# Patient Record
Sex: FEMALE | Race: WHITE | NOT HISPANIC OR LATINO | Employment: OTHER | URBAN - METROPOLITAN AREA
[De-identification: names, ages, dates, MRNs, and addresses within clinical notes are randomized per-mention and may not be internally consistent; named-entity substitution may affect disease eponyms.]

---

## 2023-01-01 ENCOUNTER — TELEPHONE (OUTPATIENT)
Dept: HEMATOLOGY/ONCOLOGY | Facility: HOSPITAL | Age: 83
End: 2023-01-01
Payer: COMMERCIAL

## 2023-01-01 ENCOUNTER — APPOINTMENT (OUTPATIENT)
Dept: RADIOLOGY | Facility: HOSPITAL | Age: 83
DRG: 193 | End: 2023-01-01
Payer: COMMERCIAL

## 2023-01-01 ENCOUNTER — APPOINTMENT (OUTPATIENT)
Dept: RADIOLOGY | Facility: HOSPITAL | Age: 83
DRG: 562 | End: 2023-01-01
Payer: COMMERCIAL

## 2023-01-01 ENCOUNTER — HOSPITAL ENCOUNTER (INPATIENT)
Facility: HOSPITAL | Age: 83
LOS: 2 days | DRG: 178 | End: 2023-12-01
Attending: INTERNAL MEDICINE
Payer: OTHER MISCELLANEOUS

## 2023-01-01 ENCOUNTER — NURSING HOME VISIT (OUTPATIENT)
Dept: POST ACUTE CARE | Facility: EXTERNAL LOCATION | Age: 83
End: 2023-01-01
Payer: COMMERCIAL

## 2023-01-01 ENCOUNTER — HOSPITAL ENCOUNTER (OUTPATIENT)
Dept: RADIOLOGY | Facility: HOSPITAL | Age: 83
Discharge: HOME | End: 2023-11-10
Payer: COMMERCIAL

## 2023-01-01 ENCOUNTER — HOSPITAL ENCOUNTER (INPATIENT)
Facility: HOSPITAL | Age: 83
LOS: 8 days | Discharge: NURSING, REHAB OR LONG-TERM CARE | DRG: 193 | End: 2023-10-22
Attending: EMERGENCY MEDICINE | Admitting: HOSPITALIST
Payer: COMMERCIAL

## 2023-01-01 ENCOUNTER — TELEPHONE (OUTPATIENT)
Dept: RADIATION ONCOLOGY | Facility: HOSPITAL | Age: 83
End: 2023-01-01
Payer: COMMERCIAL

## 2023-01-01 ENCOUNTER — PATIENT MESSAGE (OUTPATIENT)
Dept: HEMATOLOGY/ONCOLOGY | Facility: CLINIC | Age: 83
End: 2023-01-01

## 2023-01-01 ENCOUNTER — APPOINTMENT (OUTPATIENT)
Dept: HEMATOLOGY/ONCOLOGY | Facility: HOSPITAL | Age: 83
End: 2023-01-01
Payer: COMMERCIAL

## 2023-01-01 ENCOUNTER — OFFICE VISIT (OUTPATIENT)
Dept: HEMATOLOGY/ONCOLOGY | Facility: HOSPITAL | Age: 83
End: 2023-01-01
Payer: COMMERCIAL

## 2023-01-01 ENCOUNTER — APPOINTMENT (OUTPATIENT)
Dept: HEMATOLOGY/ONCOLOGY | Facility: CLINIC | Age: 83
End: 2023-01-01
Payer: COMMERCIAL

## 2023-01-01 ENCOUNTER — NURSING HOME VISIT (OUTPATIENT)
Dept: POST ACUTE CARE | Facility: EXTERNAL LOCATION | Age: 83
End: 2023-01-01
Payer: MEDICARE

## 2023-01-01 ENCOUNTER — HOSPITAL ENCOUNTER (INPATIENT)
Facility: HOSPITAL | Age: 83
LOS: 3 days | Discharge: HOSPICE | DRG: 562 | End: 2023-11-29
Attending: EMERGENCY MEDICINE | Admitting: INTERNAL MEDICINE
Payer: COMMERCIAL

## 2023-01-01 ENCOUNTER — APPOINTMENT (OUTPATIENT)
Dept: CARDIOLOGY | Facility: HOSPITAL | Age: 83
DRG: 193 | End: 2023-01-01
Payer: COMMERCIAL

## 2023-01-01 ENCOUNTER — APPOINTMENT (OUTPATIENT)
Dept: CARDIOLOGY | Facility: HOSPITAL | Age: 83
End: 2023-01-01
Payer: COMMERCIAL

## 2023-01-01 ENCOUNTER — NUTRITION (OUTPATIENT)
Dept: HEMATOLOGY/ONCOLOGY | Facility: HOSPITAL | Age: 83
End: 2023-01-01
Payer: COMMERCIAL

## 2023-01-01 ENCOUNTER — HOSPITAL ENCOUNTER (OUTPATIENT)
Dept: RADIATION ONCOLOGY | Facility: HOSPITAL | Age: 83
Setting detail: RADIATION/ONCOLOGY SERIES
Discharge: STILL ADMITTED OR ADMINISTRATIVE | End: 2023-11-02
Payer: COMMERCIAL

## 2023-01-01 ENCOUNTER — APPOINTMENT (OUTPATIENT)
Dept: RADIOLOGY | Facility: HOSPITAL | Age: 83
End: 2023-01-01
Payer: COMMERCIAL

## 2023-01-01 ENCOUNTER — TELEMEDICINE (OUTPATIENT)
Dept: HEMATOLOGY/ONCOLOGY | Facility: HOSPITAL | Age: 83
End: 2023-01-01
Payer: COMMERCIAL

## 2023-01-01 ENCOUNTER — HOSPITAL ENCOUNTER (INPATIENT)
Facility: HOSPITAL | Age: 83
LOS: 5 days | Discharge: NURSING, REHAB OR LONG-TERM CARE | DRG: 193 | End: 2023-10-06
Attending: GENERAL PRACTICE | Admitting: INTERNAL MEDICINE
Payer: COMMERCIAL

## 2023-01-01 ENCOUNTER — OFFICE VISIT (OUTPATIENT)
Dept: HEMATOLOGY/ONCOLOGY | Facility: CLINIC | Age: 83
End: 2023-01-01
Payer: COMMERCIAL

## 2023-01-01 ENCOUNTER — LAB (OUTPATIENT)
Dept: LAB | Facility: HOSPITAL | Age: 83
End: 2023-01-01
Payer: COMMERCIAL

## 2023-01-01 VITALS
DIASTOLIC BLOOD PRESSURE: 85 MMHG | OXYGEN SATURATION: 97 % | RESPIRATION RATE: 18 BRPM | WEIGHT: 98.33 LBS | SYSTOLIC BLOOD PRESSURE: 174 MMHG | HEIGHT: 61 IN | HEART RATE: 94 BPM | TEMPERATURE: 99.7 F | BODY MASS INDEX: 18.56 KG/M2

## 2023-01-01 VITALS
DIASTOLIC BLOOD PRESSURE: 59 MMHG | TEMPERATURE: 97.2 F | SYSTOLIC BLOOD PRESSURE: 104 MMHG | OXYGEN SATURATION: 99 % | BODY MASS INDEX: 17.61 KG/M2 | WEIGHT: 95.68 LBS | HEART RATE: 63 BPM | HEIGHT: 62 IN | RESPIRATION RATE: 20 BRPM

## 2023-01-01 VITALS
BODY MASS INDEX: 17.66 KG/M2 | SYSTOLIC BLOOD PRESSURE: 152 MMHG | TEMPERATURE: 97.9 F | HEIGHT: 63 IN | HEART RATE: 64 BPM | WEIGHT: 99.65 LBS | RESPIRATION RATE: 17 BRPM | DIASTOLIC BLOOD PRESSURE: 67 MMHG | OXYGEN SATURATION: 98 %

## 2023-01-01 VITALS
BODY MASS INDEX: 19.57 KG/M2 | TEMPERATURE: 98.2 F | RESPIRATION RATE: 17 BRPM | HEART RATE: 73 BPM | HEIGHT: 63 IN | OXYGEN SATURATION: 95 % | WEIGHT: 110.45 LBS | SYSTOLIC BLOOD PRESSURE: 113 MMHG | DIASTOLIC BLOOD PRESSURE: 48 MMHG

## 2023-01-01 VITALS
HEIGHT: 62 IN | TEMPERATURE: 96.6 F | WEIGHT: 93.3 LBS | OXYGEN SATURATION: 95 % | RESPIRATION RATE: 18 BRPM | SYSTOLIC BLOOD PRESSURE: 75 MMHG | DIASTOLIC BLOOD PRESSURE: 42 MMHG | BODY MASS INDEX: 17.17 KG/M2 | HEART RATE: 76 BPM

## 2023-01-01 VITALS — BODY MASS INDEX: 17.17 KG/M2 | HEIGHT: 62 IN | WEIGHT: 93.3 LBS

## 2023-01-01 VITALS
DIASTOLIC BLOOD PRESSURE: 66 MMHG | SYSTOLIC BLOOD PRESSURE: 107 MMHG | HEART RATE: 99 BPM | TEMPERATURE: 98.1 F | OXYGEN SATURATION: 94 % | BODY MASS INDEX: 16.52 KG/M2 | WEIGHT: 93.25 LBS | RESPIRATION RATE: 18 BRPM

## 2023-01-01 DIAGNOSIS — J44.1 COPD EXACERBATION (MULTI): ICD-10-CM

## 2023-01-01 DIAGNOSIS — K21.9 GASTROESOPHAGEAL REFLUX DISEASE WITHOUT ESOPHAGITIS: ICD-10-CM

## 2023-01-01 DIAGNOSIS — C34.91 SQUAMOUS CELL CARCINOMA OF RIGHT LUNG (MULTI): ICD-10-CM

## 2023-01-01 DIAGNOSIS — J18.9 ACUTE PNEUMONIA: ICD-10-CM

## 2023-01-01 DIAGNOSIS — I50.31 ACUTE DIASTOLIC HF (HEART FAILURE) (MULTI): ICD-10-CM

## 2023-01-01 DIAGNOSIS — R06.2 WHEEZING: ICD-10-CM

## 2023-01-01 DIAGNOSIS — J44.0 CHRONIC OBSTRUCTIVE PULMONARY DISEASE WITH ACUTE LOWER RESPIRATORY INFECTION (MULTI): ICD-10-CM

## 2023-01-01 DIAGNOSIS — R53.1 WEAKNESS: ICD-10-CM

## 2023-01-01 DIAGNOSIS — D64.9 ANEMIA, UNSPECIFIED TYPE: ICD-10-CM

## 2023-01-01 DIAGNOSIS — R52 PAIN: Primary | ICD-10-CM

## 2023-01-01 DIAGNOSIS — R53.1 WEAKNESS: Primary | ICD-10-CM

## 2023-01-01 DIAGNOSIS — I35.0 MODERATE AORTIC STENOSIS BY PRIOR ECHOCARDIOGRAM: ICD-10-CM

## 2023-01-01 DIAGNOSIS — J44.9 CHRONIC OBSTRUCTIVE PULMONARY DISEASE, UNSPECIFIED COPD TYPE (MULTI): ICD-10-CM

## 2023-01-01 DIAGNOSIS — M19.90 OSTEOARTHRITIS, UNSPECIFIED OSTEOARTHRITIS TYPE, UNSPECIFIED SITE: ICD-10-CM

## 2023-01-01 DIAGNOSIS — S32.040S COMPRESSION FRACTURE OF L4 LUMBAR VERTEBRA, SEQUELA: ICD-10-CM

## 2023-01-01 DIAGNOSIS — F32.A DEPRESSION, UNSPECIFIED DEPRESSION TYPE: ICD-10-CM

## 2023-01-01 DIAGNOSIS — R63.4 ABNORMAL WEIGHT LOSS: Primary | ICD-10-CM

## 2023-01-01 DIAGNOSIS — R82.90 FOUL SMELLING URINE: ICD-10-CM

## 2023-01-01 DIAGNOSIS — J96.20 ACUTE ON CHRONIC RESPIRATORY FAILURE, UNSPECIFIED WHETHER WITH HYPOXIA OR HYPERCAPNIA (MULTI): ICD-10-CM

## 2023-01-01 DIAGNOSIS — I10 PRIMARY HYPERTENSION: ICD-10-CM

## 2023-01-01 DIAGNOSIS — J96.90 RESPIRATORY FAILURE (MULTI): ICD-10-CM

## 2023-01-01 DIAGNOSIS — I10 HYPERTENSION, UNSPECIFIED TYPE: Primary | ICD-10-CM

## 2023-01-01 DIAGNOSIS — I10 HYPERTENSION, UNSPECIFIED TYPE: ICD-10-CM

## 2023-01-01 DIAGNOSIS — S42.001A CLOSED NONDISPLACED FRACTURE OF RIGHT CLAVICLE, UNSPECIFIED PART OF CLAVICLE, INITIAL ENCOUNTER: ICD-10-CM

## 2023-01-01 DIAGNOSIS — R91.1 LUNG NODULE: ICD-10-CM

## 2023-01-01 DIAGNOSIS — I35.0 AORTIC STENOSIS, SEVERE: ICD-10-CM

## 2023-01-01 DIAGNOSIS — J40 BRONCHITIS: ICD-10-CM

## 2023-01-01 DIAGNOSIS — R05.1 ACUTE COUGH: ICD-10-CM

## 2023-01-01 DIAGNOSIS — R13.12 OROPHARYNGEAL DYSPHAGIA: ICD-10-CM

## 2023-01-01 DIAGNOSIS — C34.91 SQUAMOUS CELL CARCINOMA OF RIGHT LUNG (MULTI): Primary | ICD-10-CM

## 2023-01-01 DIAGNOSIS — I50.9 CONGESTIVE HEART FAILURE, UNSPECIFIED HF CHRONICITY, UNSPECIFIED HEART FAILURE TYPE (MULTI): ICD-10-CM

## 2023-01-01 DIAGNOSIS — J44.9 CHRONIC OBSTRUCTIVE PULMONARY DISEASE, UNSPECIFIED COPD TYPE (MULTI): Primary | ICD-10-CM

## 2023-01-01 DIAGNOSIS — I27.20 PULMONARY HTN (MULTI): ICD-10-CM

## 2023-01-01 DIAGNOSIS — J18.9 ACUTE PNEUMONIA: Primary | ICD-10-CM

## 2023-01-01 DIAGNOSIS — I21.4 NSTEMI (NON-ST ELEVATED MYOCARDIAL INFARCTION) (MULTI): ICD-10-CM

## 2023-01-01 DIAGNOSIS — K21.9 GASTROESOPHAGEAL REFLUX DISEASE WITHOUT ESOPHAGITIS: Primary | ICD-10-CM

## 2023-01-01 DIAGNOSIS — J98.8 CONGESTION OF RESPIRATORY TRACT: ICD-10-CM

## 2023-01-01 DIAGNOSIS — E46 PROTEIN MALNUTRITION (MULTI): ICD-10-CM

## 2023-01-01 DIAGNOSIS — J18.9 PNEUMONIA OF BOTH LUNGS DUE TO INFECTIOUS ORGANISM, UNSPECIFIED PART OF LUNG: Primary | ICD-10-CM

## 2023-01-01 DIAGNOSIS — I5A NON-ISCHEMIC MYOCARDIAL INJURY (NON-TRAUMATIC): ICD-10-CM

## 2023-01-01 DIAGNOSIS — I50.31 ACUTE DIASTOLIC HF (HEART FAILURE) (MULTI): Primary | ICD-10-CM

## 2023-01-01 DIAGNOSIS — J18.9 PNEUMONIA OF RIGHT LOWER LOBE DUE TO INFECTIOUS ORGANISM: Primary | ICD-10-CM

## 2023-01-01 DIAGNOSIS — J18.9 PNEUMONIA OF BOTH LUNGS DUE TO INFECTIOUS ORGANISM, UNSPECIFIED PART OF LUNG: ICD-10-CM

## 2023-01-01 LAB
ABO GROUP (TYPE) IN BLOOD: NORMAL
ABO GROUP (TYPE) IN BLOOD: NORMAL
ACTH PLAS-MCNC: 35.2 PG/ML (ref 7.2–63.3)
ALBUMIN SERPL BCP-MCNC: 3 G/DL (ref 3.4–5)
ALBUMIN SERPL BCP-MCNC: 3 G/DL (ref 3.4–5)
ALBUMIN SERPL BCP-MCNC: 3.1 G/DL (ref 3.4–5)
ALBUMIN SERPL BCP-MCNC: 3.3 G/DL (ref 3.4–5)
ALBUMIN SERPL BCP-MCNC: 3.3 G/DL (ref 3.4–5)
ALBUMIN SERPL BCP-MCNC: 3.5 G/DL (ref 3.4–5)
ALBUMIN SERPL BCP-MCNC: 3.6 G/DL (ref 3.4–5)
ALP SERPL-CCNC: 40 U/L (ref 33–136)
ALP SERPL-CCNC: 44 U/L (ref 33–136)
ALP SERPL-CCNC: 45 U/L (ref 33–136)
ALP SERPL-CCNC: 48 U/L (ref 33–136)
ALP SERPL-CCNC: 48 U/L (ref 33–136)
ALP SERPL-CCNC: 49 U/L (ref 33–136)
ALP SERPL-CCNC: 61 U/L (ref 33–136)
ALT SERPL W P-5'-P-CCNC: 4 U/L (ref 7–45)
ALT SERPL W P-5'-P-CCNC: 5 U/L (ref 7–45)
ALT SERPL W P-5'-P-CCNC: 6 U/L (ref 7–45)
ALT SERPL W P-5'-P-CCNC: 6 U/L (ref 7–45)
ALT SERPL W P-5'-P-CCNC: 9 U/L (ref 7–45)
ANION GAP BLDA CALCULATED.4IONS-SCNC: 8 MMO/L (ref 10–25)
ANION GAP BLDV CALCULATED.4IONS-SCNC: 6 MMOL/L (ref 10–25)
ANION GAP SERPL CALC-SCNC: 10 MMOL/L (ref 10–20)
ANION GAP SERPL CALC-SCNC: 11 MMOL/L (ref 10–20)
ANION GAP SERPL CALC-SCNC: 12 MMOL/L (ref 10–20)
ANION GAP SERPL CALC-SCNC: 13 MMOL/L (ref 10–20)
ANION GAP SERPL CALC-SCNC: 13 MMOL/L (ref 10–20)
ANION GAP SERPL CALC-SCNC: 14 MMOL/L (ref 10–20)
ANION GAP SERPL CALC-SCNC: 15 MMOL/L (ref 10–20)
ANION GAP SERPL CALC-SCNC: 15 MMOL/L (ref 10–20)
ANION GAP SERPL CALC-SCNC: 7 MMOL/L (ref 10–20)
ANION GAP SERPL CALC-SCNC: 8 MMOL/L (ref 10–20)
ANION GAP SERPL CALC-SCNC: 9 MMOL/L (ref 10–20)
ANION GAP SERPL CALC-SCNC: 9 MMOL/L (ref 10–20)
ANTIBODY SCREEN: NORMAL
ANTIBODY SCREEN: NORMAL
APPEARANCE UR: CLEAR
AST SERPL W P-5'-P-CCNC: 12 U/L (ref 9–39)
AST SERPL W P-5'-P-CCNC: 12 U/L (ref 9–39)
AST SERPL W P-5'-P-CCNC: 15 U/L (ref 9–39)
AST SERPL W P-5'-P-CCNC: 15 U/L (ref 9–39)
AST SERPL W P-5'-P-CCNC: 17 U/L (ref 9–39)
AST SERPL W P-5'-P-CCNC: 19 U/L (ref 9–39)
AST SERPL W P-5'-P-CCNC: 22 U/L (ref 9–39)
ATRIAL RATE: 66 BPM
BACTERIA BLD CULT: NORMAL
BASE EXCESS BLDA CALC-SCNC: 6.4 MMOL/L (ref -2–3)
BASE EXCESS BLDA CALC-SCNC: 6.9 MMOL/L (ref -2–3)
BASE EXCESS BLDV CALC-SCNC: 8.1 MMOL/L (ref -2–3)
BASOPHILS # BLD AUTO: 0.02 X10*3/UL (ref 0–0.1)
BASOPHILS # BLD AUTO: 0.03 X10*3/UL (ref 0–0.1)
BASOPHILS # BLD AUTO: 0.03 X10*3/UL (ref 0–0.1)
BASOPHILS # BLD AUTO: 0.04 X10*3/UL (ref 0–0.1)
BASOPHILS NFR BLD AUTO: 0.2 %
BASOPHILS NFR BLD AUTO: 0.2 %
BASOPHILS NFR BLD AUTO: 0.3 %
BASOPHILS NFR BLD AUTO: 0.4 %
BASOPHILS NFR BLD AUTO: 0.6 %
BILIRUB SERPL-MCNC: 0.3 MG/DL (ref 0–1.2)
BILIRUB SERPL-MCNC: 0.3 MG/DL (ref 0–1.2)
BILIRUB SERPL-MCNC: 0.4 MG/DL (ref 0–1.2)
BILIRUB SERPL-MCNC: 0.5 MG/DL (ref 0–1.2)
BILIRUB SERPL-MCNC: 0.5 MG/DL (ref 0–1.2)
BILIRUB UR STRIP.AUTO-MCNC: NEGATIVE MG/DL
BNP SERPL-MCNC: 292 PG/ML (ref 0–99)
BNP SERPL-MCNC: 541 PG/ML (ref 0–99)
BNP SERPL-MCNC: 735 PG/ML (ref 0–99)
BODY TEMPERATURE: 37 DEGREES CELSIUS
BUN SERPL-MCNC: 14 MG/DL (ref 6–23)
BUN SERPL-MCNC: 14 MG/DL (ref 6–23)
BUN SERPL-MCNC: 15 MG/DL (ref 6–23)
BUN SERPL-MCNC: 17 MG/DL (ref 6–23)
BUN SERPL-MCNC: 21 MG/DL (ref 6–23)
BUN SERPL-MCNC: 22 MG/DL (ref 6–23)
BUN SERPL-MCNC: 6 MG/DL (ref 6–23)
BUN SERPL-MCNC: 6 MG/DL (ref 6–23)
BUN SERPL-MCNC: 7 MG/DL (ref 6–23)
BUN SERPL-MCNC: 8 MG/DL (ref 6–23)
BUN SERPL-MCNC: 9 MG/DL (ref 6–23)
BUN SERPL-MCNC: 9 MG/DL (ref 6–23)
CA-I BLDA-SCNC: 1.18 MMOL/L (ref 1.1–1.33)
CA-I BLDV-SCNC: 1.1 MMOL/L (ref 1.1–1.33)
CALCIUM SERPL-MCNC: 7.1 MG/DL (ref 8.6–10.3)
CALCIUM SERPL-MCNC: 7.3 MG/DL (ref 8.6–10.3)
CALCIUM SERPL-MCNC: 7.4 MG/DL (ref 8.6–10.3)
CALCIUM SERPL-MCNC: 7.7 MG/DL (ref 8.6–10.3)
CALCIUM SERPL-MCNC: 8.3 MG/DL (ref 8.6–10.3)
CALCIUM SERPL-MCNC: 8.4 MG/DL (ref 8.6–10.3)
CALCIUM SERPL-MCNC: 8.5 MG/DL (ref 8.6–10.3)
CALCIUM SERPL-MCNC: 8.5 MG/DL (ref 8.6–10.3)
CALCIUM SERPL-MCNC: 8.6 MG/DL (ref 8.6–10.3)
CALCIUM SERPL-MCNC: 8.6 MG/DL (ref 8.6–10.3)
CALCIUM SERPL-MCNC: 8.8 MG/DL (ref 8.6–10.3)
CALCIUM SERPL-MCNC: 8.8 MG/DL (ref 8.6–10.6)
CALCIUM SERPL-MCNC: 8.9 MG/DL (ref 8.6–10.3)
CALCIUM SERPL-MCNC: 8.9 MG/DL (ref 8.6–10.6)
CALCIUM SERPL-MCNC: 9 MG/DL (ref 8.6–10.3)
CALCIUM SERPL-MCNC: 9 MG/DL (ref 8.6–10.6)
CALCIUM SERPL-MCNC: 9.1 MG/DL (ref 8.6–10.3)
CARDIAC TROPONIN I PNL SERPL HS: 15 NG/L (ref 0–13)
CARDIAC TROPONIN I PNL SERPL HS: 477 NG/L (ref 0–13)
CARDIAC TROPONIN I PNL SERPL HS: 791 NG/L (ref 0–13)
CARDIAC TROPONIN I PNL SERPL HS: 813 NG/L (ref 0–13)
CARDIAC TROPONIN I PNL SERPL HS: 966 NG/L (ref 0–13)
CHLORIDE BLDA-SCNC: 100 MMOL/L (ref 98–107)
CHLORIDE BLDV-SCNC: 97 MMOL/L (ref 98–107)
CHLORIDE SERPL-SCNC: 100 MMOL/L (ref 98–107)
CHLORIDE SERPL-SCNC: 101 MMOL/L (ref 98–107)
CHLORIDE SERPL-SCNC: 102 MMOL/L (ref 98–107)
CHLORIDE SERPL-SCNC: 102 MMOL/L (ref 98–107)
CHLORIDE SERPL-SCNC: 104 MMOL/L (ref 98–107)
CHLORIDE SERPL-SCNC: 104 MMOL/L (ref 98–107)
CHLORIDE SERPL-SCNC: 93 MMOL/L (ref 98–107)
CHLORIDE SERPL-SCNC: 95 MMOL/L (ref 98–107)
CHLORIDE SERPL-SCNC: 96 MMOL/L (ref 98–107)
CHLORIDE SERPL-SCNC: 97 MMOL/L (ref 98–107)
CHLORIDE SERPL-SCNC: 97 MMOL/L (ref 98–107)
CHLORIDE SERPL-SCNC: 98 MMOL/L (ref 98–107)
CHLORIDE SERPL-SCNC: 98 MMOL/L (ref 98–107)
CHLORIDE SERPL-SCNC: 99 MMOL/L (ref 98–107)
CHLORIDE SERPL-SCNC: 99 MMOL/L (ref 98–107)
CHOLEST SERPL-MCNC: 127 MG/DL (ref 0–199)
CHOLESTEROL/HDL RATIO: 2.9
CO2 SERPL-SCNC: 26 MMOL/L (ref 21–32)
CO2 SERPL-SCNC: 26 MMOL/L (ref 21–32)
CO2 SERPL-SCNC: 28 MMOL/L (ref 21–32)
CO2 SERPL-SCNC: 29 MMOL/L (ref 21–32)
CO2 SERPL-SCNC: 31 MMOL/L (ref 21–32)
CO2 SERPL-SCNC: 32 MMOL/L (ref 21–32)
CO2 SERPL-SCNC: 33 MMOL/L (ref 21–32)
CO2 SERPL-SCNC: 34 MMOL/L (ref 21–32)
CO2 SERPL-SCNC: 35 MMOL/L (ref 21–32)
CO2 SERPL-SCNC: 35 MMOL/L (ref 21–32)
CO2 SERPL-SCNC: 38 MMOL/L (ref 21–32)
COLOR UR: YELLOW
CORTIS AM PEAK SERPL-MSCNC: 10.3 UG/DL (ref 5–20)
CREAT SERPL-MCNC: 0.74 MG/DL (ref 0.5–1.05)
CREAT SERPL-MCNC: 0.78 MG/DL (ref 0.5–1.05)
CREAT SERPL-MCNC: 0.8 MG/DL (ref 0.5–1.05)
CREAT SERPL-MCNC: 0.8 MG/DL (ref 0.5–1.05)
CREAT SERPL-MCNC: 0.82 MG/DL (ref 0.5–1.05)
CREAT SERPL-MCNC: 0.82 MG/DL (ref 0.5–1.05)
CREAT SERPL-MCNC: 0.83 MG/DL (ref 0.5–1.05)
CREAT SERPL-MCNC: 0.83 MG/DL (ref 0.5–1.05)
CREAT SERPL-MCNC: 0.84 MG/DL (ref 0.5–1.05)
CREAT SERPL-MCNC: 0.85 MG/DL (ref 0.5–1.05)
CREAT SERPL-MCNC: 0.85 MG/DL (ref 0.5–1.05)
CREAT SERPL-MCNC: 0.88 MG/DL (ref 0.5–1.05)
CREAT SERPL-MCNC: 0.89 MG/DL (ref 0.5–1.05)
CREAT SERPL-MCNC: 0.94 MG/DL (ref 0.5–1.05)
CREAT SERPL-MCNC: 0.96 MG/DL (ref 0.5–1.05)
CREAT SERPL-MCNC: 1 MG/DL (ref 0.5–1.05)
CREAT SERPL-MCNC: 1 MG/DL (ref 0.5–1.05)
CREAT SERPL-MCNC: 1.26 MG/DL (ref 0.5–1.05)
CREAT SERPL-MCNC: 1.47 MG/DL (ref 0.5–1.05)
CRP SERPL-MCNC: 16.04 MG/DL
CRP SERPL-MCNC: 7.81 MG/DL
D DIMER PPP FEU-MCNC: 1600 NG/ML FEU
EJECTION FRACTION APICAL 4 CHAMBER: 73.6
ELECTRONICALLY SIGNED BY: NORMAL
EOSINOPHIL # BLD AUTO: 0.06 X10*3/UL (ref 0–0.4)
EOSINOPHIL # BLD AUTO: 0.12 X10*3/UL (ref 0–0.4)
EOSINOPHIL # BLD AUTO: 0.15 X10*3/UL (ref 0–0.4)
EOSINOPHIL # BLD AUTO: 0.15 X10*3/UL (ref 0–0.4)
EOSINOPHIL # BLD AUTO: 0.2 X10*3/UL (ref 0–0.4)
EOSINOPHIL # BLD AUTO: 0.22 X10*3/UL (ref 0–0.4)
EOSINOPHIL # BLD AUTO: 0.23 X10*3/UL (ref 0–0.4)
EOSINOPHIL NFR BLD AUTO: 0.5 %
EOSINOPHIL NFR BLD AUTO: 1.6 %
EOSINOPHIL NFR BLD AUTO: 2 %
EOSINOPHIL NFR BLD AUTO: 2.2 %
EOSINOPHIL NFR BLD AUTO: 3.1 %
EOSINOPHIL NFR BLD AUTO: 3.5 %
EOSINOPHIL NFR BLD AUTO: 3.5 %
ERYTHROCYTE [DISTWIDTH] IN BLOOD BY AUTOMATED COUNT: 14.1 % (ref 11.5–14.5)
ERYTHROCYTE [DISTWIDTH] IN BLOOD BY AUTOMATED COUNT: 14.2 % (ref 11.5–14.5)
ERYTHROCYTE [DISTWIDTH] IN BLOOD BY AUTOMATED COUNT: 14.2 % (ref 11.5–14.5)
ERYTHROCYTE [DISTWIDTH] IN BLOOD BY AUTOMATED COUNT: 14.4 % (ref 11.5–14.5)
ERYTHROCYTE [DISTWIDTH] IN BLOOD BY AUTOMATED COUNT: 14.5 % (ref 11.5–14.5)
ERYTHROCYTE [DISTWIDTH] IN BLOOD BY AUTOMATED COUNT: 14.6 % (ref 11.5–14.5)
ERYTHROCYTE [DISTWIDTH] IN BLOOD BY AUTOMATED COUNT: 14.7 % (ref 11.5–14.5)
ERYTHROCYTE [DISTWIDTH] IN BLOOD BY AUTOMATED COUNT: 14.8 % (ref 11.5–14.5)
ERYTHROCYTE [DISTWIDTH] IN BLOOD BY AUTOMATED COUNT: 15.1 % (ref 11.5–14.5)
ERYTHROCYTE [DISTWIDTH] IN BLOOD BY AUTOMATED COUNT: 15.3 % (ref 11.5–14.5)
ERYTHROCYTE [DISTWIDTH] IN BLOOD BY AUTOMATED COUNT: 15.5 % (ref 11.5–14.5)
ERYTHROCYTE [DISTWIDTH] IN BLOOD BY AUTOMATED COUNT: 15.5 % (ref 11.5–14.5)
ERYTHROCYTE [DISTWIDTH] IN BLOOD BY AUTOMATED COUNT: 15.6 % (ref 11.5–14.5)
FERRITIN SERPL-MCNC: 148 NG/ML (ref 8–150)
FERRITIN SERPL-MCNC: 175 NG/ML (ref 8–150)
FLUAV RNA RESP QL NAA+PROBE: NOT DETECTED
FLUBV RNA RESP QL NAA+PROBE: NOT DETECTED
FOCUSED SOLID TUMOR DNA/RNA RESULTS: NORMAL
FOLATE SERPL-MCNC: 8.2 NG/ML
GFR SERPL CREATININE-BSD FRML MDRD: 35 ML/MIN/1.73M*2
GFR SERPL CREATININE-BSD FRML MDRD: 43 ML/MIN/1.73M*2
GFR SERPL CREATININE-BSD FRML MDRD: 56 ML/MIN/1.73M*2
GFR SERPL CREATININE-BSD FRML MDRD: 57 ML/MIN/1.73M*2
GFR SERPL CREATININE-BSD FRML MDRD: 60 ML/MIN/1.73M*2
GFR SERPL CREATININE-BSD FRML MDRD: 61 ML/MIN/1.73M*2
GFR SERPL CREATININE-BSD FRML MDRD: 65 ML/MIN/1.73M*2
GFR SERPL CREATININE-BSD FRML MDRD: 66 ML/MIN/1.73M*2
GFR SERPL CREATININE-BSD FRML MDRD: 69 ML/MIN/1.73M*2
GFR SERPL CREATININE-BSD FRML MDRD: 70 ML/MIN/1.73M*2
GFR SERPL CREATININE-BSD FRML MDRD: 70 ML/MIN/1.73M*2
GFR SERPL CREATININE-BSD FRML MDRD: 72 ML/MIN/1.73M*2
GFR SERPL CREATININE-BSD FRML MDRD: 72 ML/MIN/1.73M*2
GFR SERPL CREATININE-BSD FRML MDRD: 74 ML/MIN/1.73M*2
GFR SERPL CREATININE-BSD FRML MDRD: 74 ML/MIN/1.73M*2
GFR SERPL CREATININE-BSD FRML MDRD: 76 ML/MIN/1.73M*2
GFR SERPL CREATININE-BSD FRML MDRD: 81 ML/MIN/1.73M*2
GLUCOSE BLD MANUAL STRIP-MCNC: 103 MG/DL (ref 74–99)
GLUCOSE BLD MANUAL STRIP-MCNC: 126 MG/DL (ref 74–99)
GLUCOSE BLD MANUAL STRIP-MCNC: 64 MG/DL (ref 74–99)
GLUCOSE BLD MANUAL STRIP-MCNC: 77 MG/DL (ref 74–99)
GLUCOSE BLD MANUAL STRIP-MCNC: 81 MG/DL (ref 74–99)
GLUCOSE BLD MANUAL STRIP-MCNC: 93 MG/DL (ref 74–99)
GLUCOSE BLDA-MCNC: 98 MG/DL (ref 74–99)
GLUCOSE BLDV-MCNC: 103 MG/DL (ref 74–99)
GLUCOSE SERPL-MCNC: 102 MG/DL (ref 74–99)
GLUCOSE SERPL-MCNC: 118 MG/DL (ref 74–99)
GLUCOSE SERPL-MCNC: 127 MG/DL (ref 74–99)
GLUCOSE SERPL-MCNC: 166 MG/DL (ref 74–99)
GLUCOSE SERPL-MCNC: 65 MG/DL (ref 74–99)
GLUCOSE SERPL-MCNC: 67 MG/DL (ref 74–99)
GLUCOSE SERPL-MCNC: 68 MG/DL (ref 74–99)
GLUCOSE SERPL-MCNC: 69 MG/DL (ref 74–99)
GLUCOSE SERPL-MCNC: 71 MG/DL (ref 74–99)
GLUCOSE SERPL-MCNC: 71 MG/DL (ref 74–99)
GLUCOSE SERPL-MCNC: 73 MG/DL (ref 74–99)
GLUCOSE SERPL-MCNC: 75 MG/DL (ref 74–99)
GLUCOSE SERPL-MCNC: 79 MG/DL (ref 74–99)
GLUCOSE SERPL-MCNC: 80 MG/DL (ref 74–99)
GLUCOSE SERPL-MCNC: 80 MG/DL (ref 74–99)
GLUCOSE SERPL-MCNC: 81 MG/DL (ref 74–99)
GLUCOSE SERPL-MCNC: 82 MG/DL (ref 74–99)
GLUCOSE SERPL-MCNC: 93 MG/DL (ref 74–99)
GLUCOSE SERPL-MCNC: 95 MG/DL (ref 74–99)
GLUCOSE UR STRIP.AUTO-MCNC: NEGATIVE MG/DL
HBV CORE AB SER QL: NONREACTIVE
HBV SURFACE AB SER-ACNC: <3.1 MIU/ML
HBV SURFACE AG SERPL QL IA: NONREACTIVE
HCO3 BLDA-SCNC: 31.9 MMOL/L (ref 22–26)
HCO3 BLDA-SCNC: 32.7 MMOL/L (ref 22–26)
HCO3 BLDV-SCNC: 33.7 MMOL/L (ref 22–26)
HCT VFR BLD AUTO: 23.7 % (ref 36–46)
HCT VFR BLD AUTO: 24 % (ref 36–46)
HCT VFR BLD AUTO: 25.8 % (ref 36–46)
HCT VFR BLD AUTO: 27 % (ref 36–46)
HCT VFR BLD AUTO: 27.2 % (ref 36–46)
HCT VFR BLD AUTO: 27.6 % (ref 36–46)
HCT VFR BLD AUTO: 27.7 % (ref 36–46)
HCT VFR BLD AUTO: 28.1 % (ref 36–46)
HCT VFR BLD AUTO: 28.3 % (ref 36–46)
HCT VFR BLD AUTO: 28.5 % (ref 36–46)
HCT VFR BLD AUTO: 28.7 % (ref 36–46)
HCT VFR BLD AUTO: 29.1 % (ref 36–46)
HCT VFR BLD AUTO: 29.2 % (ref 36–46)
HCT VFR BLD AUTO: 30.2 % (ref 36–46)
HCT VFR BLD AUTO: 31.4 % (ref 36–46)
HCT VFR BLD EST: 22 % (ref 36–46)
HCT VFR BLD EST: 29 % (ref 36–46)
HDLC SERPL-MCNC: 43.4 MG/DL
HGB BLD-MCNC: 7.3 G/DL (ref 12–16)
HGB BLD-MCNC: 7.3 G/DL (ref 12–16)
HGB BLD-MCNC: 7.8 G/DL (ref 12–16)
HGB BLD-MCNC: 8.2 G/DL (ref 12–16)
HGB BLD-MCNC: 8.5 G/DL (ref 12–16)
HGB BLD-MCNC: 8.7 G/DL (ref 12–16)
HGB BLD-MCNC: 8.7 G/DL (ref 12–16)
HGB BLD-MCNC: 8.8 G/DL (ref 12–16)
HGB BLD-MCNC: 8.9 G/DL (ref 12–16)
HGB BLD-MCNC: 9 G/DL (ref 12–16)
HGB BLD-MCNC: 9.1 G/DL (ref 12–16)
HGB BLD-MCNC: 9.2 G/DL (ref 12–16)
HGB BLDA-MCNC: 7.3 G/DL (ref 12–16)
HGB BLDV-MCNC: 9.8 G/DL (ref 12–16)
HOLD SPECIMEN: NORMAL
IMM GRANULOCYTES # BLD AUTO: 0.01 X10*3/UL (ref 0–0.5)
IMM GRANULOCYTES # BLD AUTO: 0.02 X10*3/UL (ref 0–0.5)
IMM GRANULOCYTES # BLD AUTO: 0.02 X10*3/UL (ref 0–0.5)
IMM GRANULOCYTES # BLD AUTO: 0.03 X10*3/UL (ref 0–0.5)
IMM GRANULOCYTES # BLD AUTO: 0.07 X10*3/UL (ref 0–0.5)
IMM GRANULOCYTES NFR BLD AUTO: 0.2 % (ref 0–0.9)
IMM GRANULOCYTES NFR BLD AUTO: 0.3 % (ref 0–0.9)
IMM GRANULOCYTES NFR BLD AUTO: 0.4 % (ref 0–0.9)
IMM GRANULOCYTES NFR BLD AUTO: 0.5 % (ref 0–0.9)
IMM GRANULOCYTES NFR BLD AUTO: 0.6 % (ref 0–0.9)
INHALED O2 CONCENTRATION: 21 %
INHALED O2 CONCENTRATION: 45 %
INHALED O2 CONCENTRATION: 92 %
INR PPP: 1.1 (ref 0.9–1.1)
IRON SATN MFR SERPL: 11 % (ref 25–45)
IRON SATN MFR SERPL: 8 % (ref 25–45)
IRON SERPL-MCNC: 16 UG/DL (ref 35–150)
IRON SERPL-MCNC: 18 UG/DL (ref 35–150)
KETONES UR STRIP.AUTO-MCNC: NEGATIVE MG/DL
LAB AP ASR DISCLAIMER: NORMAL
LABORATORY COMMENT REPORT: NORMAL
LACTATE BLDA-SCNC: 0.6 MMOL/L (ref 0.4–2)
LACTATE BLDV-SCNC: 1 MMOL/L (ref 0.4–2)
LACTATE SERPL-SCNC: 0.6 MMOL/L (ref 0.4–2)
LEGIONELLA AG UR QL: NEGATIVE
LEUKOCYTE ESTERASE UR QL STRIP.AUTO: NEGATIVE
LYMPHOCYTES # BLD AUTO: 0.96 X10*3/UL (ref 0.8–3)
LYMPHOCYTES # BLD AUTO: 1.02 X10*3/UL (ref 0.8–3)
LYMPHOCYTES # BLD AUTO: 1.13 X10*3/UL (ref 0.8–3)
LYMPHOCYTES # BLD AUTO: 1.18 X10*3/UL (ref 0.8–3)
LYMPHOCYTES # BLD AUTO: 1.28 X10*3/UL (ref 0.8–3)
LYMPHOCYTES # BLD AUTO: 1.3 X10*3/UL (ref 0.8–3)
LYMPHOCYTES # BLD AUTO: 1.42 X10*3/UL (ref 0.8–3)
LYMPHOCYTES NFR BLD AUTO: 10.2 %
LYMPHOCYTES NFR BLD AUTO: 16.1 %
LYMPHOCYTES NFR BLD AUTO: 18.4 %
LYMPHOCYTES NFR BLD AUTO: 20 %
LYMPHOCYTES NFR BLD AUTO: 20.6 %
LYMPHOCYTES NFR BLD AUTO: 21.2 %
LYMPHOCYTES NFR BLD AUTO: 9.3 %
MAGNESIUM SERPL-MCNC: 1.5 MG/DL (ref 1.6–2.4)
MAGNESIUM SERPL-MCNC: 1.5 MG/DL (ref 1.6–2.4)
MAGNESIUM SERPL-MCNC: 1.6 MG/DL (ref 1.6–2.4)
MAGNESIUM SERPL-MCNC: 1.6 MG/DL (ref 1.6–2.4)
MAGNESIUM SERPL-MCNC: 1.8 MG/DL (ref 1.6–2.4)
MAGNESIUM SERPL-MCNC: 1.9 MG/DL (ref 1.6–2.4)
MAGNESIUM SERPL-MCNC: 2 MG/DL (ref 1.6–2.4)
MAGNESIUM SERPL-MCNC: 2.14 MG/DL (ref 1.6–2.4)
MCH RBC QN AUTO: 29.2 PG (ref 26–34)
MCH RBC QN AUTO: 29.5 PG (ref 26–34)
MCH RBC QN AUTO: 29.5 PG (ref 26–34)
MCH RBC QN AUTO: 29.7 PG (ref 26–34)
MCH RBC QN AUTO: 29.7 PG (ref 26–34)
MCH RBC QN AUTO: 29.8 PG (ref 26–34)
MCH RBC QN AUTO: 30.1 PG (ref 26–34)
MCH RBC QN AUTO: 30.2 PG (ref 26–34)
MCH RBC QN AUTO: 30.2 PG (ref 26–34)
MCH RBC QN AUTO: 30.3 PG (ref 26–34)
MCH RBC QN AUTO: 30.3 PG (ref 26–34)
MCH RBC QN AUTO: 30.4 PG (ref 26–34)
MCH RBC QN AUTO: 30.5 PG (ref 26–34)
MCH RBC QN AUTO: 30.7 PG (ref 26–34)
MCH RBC QN AUTO: 30.8 PG (ref 26–34)
MCHC RBC AUTO-ENTMCNC: 28.7 G/DL (ref 32–36)
MCHC RBC AUTO-ENTMCNC: 29.5 G/DL (ref 32–36)
MCHC RBC AUTO-ENTMCNC: 30.1 G/DL (ref 32–36)
MCHC RBC AUTO-ENTMCNC: 30.2 G/DL (ref 32–36)
MCHC RBC AUTO-ENTMCNC: 30.2 G/DL (ref 32–36)
MCHC RBC AUTO-ENTMCNC: 30.4 G/DL (ref 32–36)
MCHC RBC AUTO-ENTMCNC: 30.5 G/DL (ref 32–36)
MCHC RBC AUTO-ENTMCNC: 30.6 G/DL (ref 32–36)
MCHC RBC AUTO-ENTMCNC: 30.8 G/DL (ref 32–36)
MCHC RBC AUTO-ENTMCNC: 31.2 G/DL (ref 32–36)
MCHC RBC AUTO-ENTMCNC: 31.5 G/DL (ref 32–36)
MCHC RBC AUTO-ENTMCNC: 31.8 G/DL (ref 32–36)
MCHC RBC AUTO-ENTMCNC: 32.1 G/DL (ref 32–36)
MCHC RBC AUTO-ENTMCNC: 32.2 G/DL (ref 32–36)
MCHC RBC AUTO-ENTMCNC: 32.2 G/DL (ref 32–36)
MCV RBC AUTO: 100 FL (ref 80–100)
MCV RBC AUTO: 104 FL (ref 80–100)
MCV RBC AUTO: 94 FL (ref 80–100)
MCV RBC AUTO: 95 FL (ref 80–100)
MCV RBC AUTO: 96 FL (ref 80–100)
MCV RBC AUTO: 96 FL (ref 80–100)
MCV RBC AUTO: 97 FL (ref 80–100)
MCV RBC AUTO: 98 FL (ref 80–100)
MCV RBC AUTO: 98 FL (ref 80–100)
MCV RBC AUTO: 99 FL (ref 80–100)
MONOCYTES # BLD AUTO: 0.55 X10*3/UL (ref 0.05–0.8)
MONOCYTES # BLD AUTO: 0.61 X10*3/UL (ref 0.05–0.8)
MONOCYTES # BLD AUTO: 0.66 X10*3/UL (ref 0.05–0.8)
MONOCYTES # BLD AUTO: 0.93 X10*3/UL (ref 0.05–0.8)
MONOCYTES # BLD AUTO: 0.96 X10*3/UL (ref 0.05–0.8)
MONOCYTES # BLD AUTO: 1.07 X10*3/UL (ref 0.05–0.8)
MONOCYTES # BLD AUTO: 1.68 X10*3/UL (ref 0.05–0.8)
MONOCYTES NFR BLD AUTO: 10.1 %
MONOCYTES NFR BLD AUTO: 10.2 %
MONOCYTES NFR BLD AUTO: 11.4 %
MONOCYTES NFR BLD AUTO: 13.8 %
MONOCYTES NFR BLD AUTO: 14 %
MONOCYTES NFR BLD AUTO: 14.5 %
MONOCYTES NFR BLD AUTO: 8.7 %
MRSA DNA SPEC QL NAA+PROBE: DETECTED
NEUTROPHILS # BLD AUTO: 4 X10*3/UL (ref 1.6–5.5)
NEUTROPHILS # BLD AUTO: 4.06 X10*3/UL (ref 1.6–5.5)
NEUTROPHILS # BLD AUTO: 4.25 X10*3/UL (ref 1.6–5.5)
NEUTROPHILS # BLD AUTO: 4.29 X10*3/UL (ref 1.6–5.5)
NEUTROPHILS # BLD AUTO: 4.49 X10*3/UL (ref 1.6–5.5)
NEUTROPHILS # BLD AUTO: 7.17 X10*3/UL (ref 1.6–5.5)
NEUTROPHILS # BLD AUTO: 9.21 X10*3/UL (ref 1.6–5.5)
NEUTROPHILS NFR BLD AUTO: 62.4 %
NEUTROPHILS NFR BLD AUTO: 63.1 %
NEUTROPHILS NFR BLD AUTO: 65.5 %
NEUTROPHILS NFR BLD AUTO: 66.2 %
NEUTROPHILS NFR BLD AUTO: 71.1 %
NEUTROPHILS NFR BLD AUTO: 75.6 %
NEUTROPHILS NFR BLD AUTO: 76.3 %
NIL(NEG) CONTROL SPOT COUNT: NORMAL
NITRITE UR QL STRIP.AUTO: NEGATIVE
NON-HDL CHOLESTEROL: 84 MG/DL (ref 0–149)
NRBC BLD-RTO: 0 /100 WBCS (ref 0–0)
OXYHGB MFR BLDA: 89.1 % (ref 94–98)
OXYHGB MFR BLDA: 93.7 % (ref 94–98)
OXYHGB MFR BLDV: 76.9 % (ref 45–75)
P AXIS: 59 DEGREES
P OFFSET: 207 MS
P ONSET: 156 MS
PANEL A SPOT COUNT: 0
PANEL B SPOT COUNT: 0
PATH REPORT.ADDENDUM SPEC: NORMAL
PATH REPORT.COMMENTS IMP SPEC: NORMAL
PATH REPORT.FINAL DX SPEC: NORMAL
PATH REPORT.GROSS SPEC: NORMAL
PATH REPORT.TOTAL CANCER: NORMAL
PCO2 BLDA: 48 MM HG (ref 38–42)
PCO2 BLDA: 54 MM HG (ref 38–42)
PCO2 BLDV: 52 MM HG (ref 41–51)
PH BLDA: 7.39 PH (ref 7.38–7.42)
PH BLDA: 7.43 PH (ref 7.38–7.42)
PH BLDV: 7.42 PH (ref 7.33–7.43)
PH UR STRIP.AUTO: 5 [PH]
PLATELET # BLD AUTO: 234 X10*3/UL (ref 150–450)
PLATELET # BLD AUTO: 237 X10*3/UL (ref 150–450)
PLATELET # BLD AUTO: 246 X10*3/UL (ref 150–450)
PLATELET # BLD AUTO: 248 X10*3/UL (ref 150–450)
PLATELET # BLD AUTO: 248 X10*3/UL (ref 150–450)
PLATELET # BLD AUTO: 255 X10*3/UL (ref 150–450)
PLATELET # BLD AUTO: 268 X10*3/UL (ref 150–450)
PLATELET # BLD AUTO: 268 X10*3/UL (ref 150–450)
PLATELET # BLD AUTO: 296 X10*3/UL (ref 150–450)
PLATELET # BLD AUTO: 304 X10*3/UL (ref 150–450)
PLATELET # BLD AUTO: 324 X10*3/UL (ref 150–450)
PLATELET # BLD AUTO: 333 X10*3/UL (ref 150–450)
PLATELET # BLD AUTO: 366 X10*3/UL (ref 150–450)
PLATELET # BLD AUTO: 382 X10*3/UL (ref 150–450)
PLATELET # BLD AUTO: 387 X10*3/UL (ref 150–450)
PMV BLD AUTO: 9.1 FL (ref 7.5–11.5)
PMV BLD AUTO: 9.3 FL (ref 7.5–11.5)
PMV BLD AUTO: 9.4 FL (ref 7.5–11.5)
PMV BLD AUTO: 9.5 FL (ref 7.5–11.5)
PMV BLD AUTO: 9.5 FL (ref 7.5–11.5)
PMV BLD AUTO: 9.6 FL (ref 7.5–11.5)
PMV BLD AUTO: 9.7 FL (ref 7.5–11.5)
PMV BLD AUTO: 9.8 FL (ref 7.5–11.5)
PO2 BLDA: 59 MM HG (ref 85–95)
PO2 BLDA: 67 MM HG (ref 85–95)
PO2 BLDV: 47 MM HG (ref 35–45)
POS CONTROL SPOT COUNT: NORMAL
POTASSIUM BLDA-SCNC: 4.1 MMOL/L (ref 3.5–5.3)
POTASSIUM BLDV-SCNC: 4.1 MMOL/L (ref 3.5–5.3)
POTASSIUM SERPL-SCNC: 3.3 MMOL/L (ref 3.5–5.3)
POTASSIUM SERPL-SCNC: 3.4 MMOL/L (ref 3.5–5.3)
POTASSIUM SERPL-SCNC: 3.5 MMOL/L (ref 3.5–5.3)
POTASSIUM SERPL-SCNC: 3.6 MMOL/L (ref 3.5–5.3)
POTASSIUM SERPL-SCNC: 3.9 MMOL/L (ref 3.5–5.3)
POTASSIUM SERPL-SCNC: 4 MMOL/L (ref 3.5–5.3)
POTASSIUM SERPL-SCNC: 4.1 MMOL/L (ref 3.5–5.3)
POTASSIUM SERPL-SCNC: 4.1 MMOL/L (ref 3.5–5.3)
POTASSIUM SERPL-SCNC: 4.3 MMOL/L (ref 3.5–5.3)
POTASSIUM SERPL-SCNC: 4.4 MMOL/L (ref 3.5–5.3)
POTASSIUM SERPL-SCNC: 4.4 MMOL/L (ref 3.5–5.3)
POTASSIUM SERPL-SCNC: 4.5 MMOL/L (ref 3.5–5.3)
POTASSIUM SERPL-SCNC: 4.6 MMOL/L (ref 3.5–5.3)
PR INTERVAL: 146 MS
PROCALCITONIN SERPL-MCNC: 0.12 NG/ML
PROT SERPL-MCNC: 5.8 G/DL (ref 6.4–8.2)
PROT SERPL-MCNC: 5.9 G/DL (ref 6.4–8.2)
PROT SERPL-MCNC: 6.1 G/DL (ref 6.4–8.2)
PROT SERPL-MCNC: 6.1 G/DL (ref 6.4–8.2)
PROT SERPL-MCNC: 6.5 G/DL (ref 6.4–8.2)
PROT SERPL-MCNC: 6.6 G/DL (ref 6.4–8.2)
PROT SERPL-MCNC: 6.8 G/DL (ref 6.4–8.2)
PROT UR STRIP.AUTO-MCNC: NEGATIVE MG/DL
PROTHROMBIN TIME: 12.9 SECONDS (ref 9.8–12.8)
Q ONSET: 229 MS
QRS COUNT: 11 BEATS
QRS DURATION: 96 MS
QT INTERVAL: 414 MS
QTC CALCULATION(BAZETT): 434 MS
QTC FREDERICIA: 427 MS
R AXIS: 4 DEGREES
RBC # BLD AUTO: 2.37 X10*6/UL (ref 4–5.2)
RBC # BLD AUTO: 2.39 X10*6/UL (ref 4–5.2)
RBC # BLD AUTO: 2.64 X10*6/UL (ref 4–5.2)
RBC # BLD AUTO: 2.72 X10*6/UL (ref 4–5.2)
RBC # BLD AUTO: 2.8 X10*6/UL (ref 4–5.2)
RBC # BLD AUTO: 2.87 X10*6/UL (ref 4–5.2)
RBC # BLD AUTO: 2.88 X10*6/UL (ref 4–5.2)
RBC # BLD AUTO: 2.91 X10*6/UL (ref 4–5.2)
RBC # BLD AUTO: 2.96 X10*6/UL (ref 4–5.2)
RBC # BLD AUTO: 2.99 X10*6/UL (ref 4–5.2)
RBC # BLD AUTO: 3 X10*6/UL (ref 4–5.2)
RBC # BLD AUTO: 3.02 X10*6/UL (ref 4–5.2)
RBC # BLD AUTO: 3.03 X10*6/UL (ref 4–5.2)
RBC # BLD AUTO: 3.04 X10*6/UL (ref 4–5.2)
RBC # BLD AUTO: 3.05 X10*6/UL (ref 4–5.2)
RBC # UR STRIP.AUTO: NEGATIVE /UL
RH FACTOR (ANTIGEN D): NORMAL
RH FACTOR (ANTIGEN D): NORMAL
S PNEUM AG UR QL: NEGATIVE
SAO2 % BLDA: 92 % (ref 94–100)
SAO2 % BLDA: 96 % (ref 94–100)
SAO2 % BLDV: 79 % (ref 45–75)
SARS-COV-2 RNA RESP QL NAA+PROBE: NOT DETECTED
SARS-COV-2 RNA RESP QL NAA+PROBE: NOT DETECTED
SODIUM BLDA-SCNC: 137 MMOL/L (ref 136–145)
SODIUM BLDV-SCNC: 133 MMOL/L (ref 136–145)
SODIUM SERPL-SCNC: 134 MMOL/L (ref 136–145)
SODIUM SERPL-SCNC: 136 MMOL/L (ref 136–145)
SODIUM SERPL-SCNC: 137 MMOL/L (ref 136–145)
SODIUM SERPL-SCNC: 138 MMOL/L (ref 136–145)
SODIUM SERPL-SCNC: 139 MMOL/L (ref 136–145)
SODIUM SERPL-SCNC: 140 MMOL/L (ref 136–145)
SP GR UR STRIP.AUTO: 1.02
T AXIS: 3 DEGREES
T OFFSET: 436 MS
T-SPOT. TB INTERPRETATION: NEGATIVE
TEST COMMENT: ABNORMAL
TIBC SERPL-MCNC: 167 UG/DL (ref 240–445)
TIBC SERPL-MCNC: 205 UG/DL (ref 240–445)
TSH SERPL-ACNC: 2.12 MIU/L (ref 0.44–3.98)
TSH SERPL-ACNC: 3.33 MIU/L (ref 0.44–3.98)
UFH PPP CHRO-ACNC: 0.4 IU/ML
UFH PPP CHRO-ACNC: 0.4 IU/ML
UFH PPP CHRO-ACNC: 0.5 IU/ML
UFH PPP CHRO-ACNC: 0.5 IU/ML
UIBC SERPL-MCNC: 149 UG/DL (ref 110–370)
UIBC SERPL-MCNC: 189 UG/DL (ref 110–370)
UROBILINOGEN UR STRIP.AUTO-MCNC: <2 MG/DL
VANCOMYCIN TROUGH SERPL-MCNC: 10.9 UG/ML (ref 5–20)
VANCOMYCIN TROUGH SERPL-MCNC: 6 UG/ML (ref 5–20)
VENTRICULAR RATE: 66 BPM
VIT B12 SERPL-MCNC: 703 PG/ML (ref 211–911)
WBC # BLD AUTO: 10.3 X10*3/UL (ref 4.4–11.3)
WBC # BLD AUTO: 10.3 X10*3/UL (ref 4.4–11.3)
WBC # BLD AUTO: 11.3 X10*3/UL (ref 4.4–11.3)
WBC # BLD AUTO: 11.6 X10*3/UL (ref 4.4–11.3)
WBC # BLD AUTO: 12.2 X10*3/UL (ref 4.4–11.3)
WBC # BLD AUTO: 4.5 X10*3/UL (ref 4.4–11.3)
WBC # BLD AUTO: 6 X10*3/UL (ref 4.4–11.3)
WBC # BLD AUTO: 6.3 X10*3/UL (ref 4.4–11.3)
WBC # BLD AUTO: 6.4 X10*3/UL (ref 4.4–11.3)
WBC # BLD AUTO: 6.5 X10*3/UL (ref 4.4–11.3)
WBC # BLD AUTO: 6.5 X10*3/UL (ref 4.4–11.3)
WBC # BLD AUTO: 6.9 X10*3/UL (ref 4.4–11.3)
WBC # BLD AUTO: 8.2 X10*3/UL (ref 4.4–11.3)
WBC # BLD AUTO: 8.3 X10*3/UL (ref 4.4–11.3)
WBC # BLD AUTO: 9.4 X10*3/UL (ref 4.4–11.3)

## 2023-01-01 PROCEDURE — 1100000001 HC PRIVATE ROOM DAILY

## 2023-01-01 PROCEDURE — 82533 TOTAL CORTISOL: CPT

## 2023-01-01 PROCEDURE — 99308 SBSQ NF CARE LOW MDM 20: CPT | Performed by: INTERNAL MEDICINE

## 2023-01-01 PROCEDURE — 99205 OFFICE O/P NEW HI 60 MIN: CPT | Performed by: INTERNAL MEDICINE

## 2023-01-01 PROCEDURE — 83735 ASSAY OF MAGNESIUM: CPT | Performed by: INTERNAL MEDICINE

## 2023-01-01 PROCEDURE — 2500000001 HC RX 250 WO HCPCS SELF ADMINISTERED DRUGS (ALT 637 FOR MEDICARE OP)

## 2023-01-01 PROCEDURE — 2550000001 HC RX 255 CONTRASTS: Performed by: INTERNAL MEDICINE

## 2023-01-01 PROCEDURE — G1004 CDSM NDSC: HCPCS

## 2023-01-01 PROCEDURE — 2500000004 HC RX 250 GENERAL PHARMACY W/ HCPCS (ALT 636 FOR OP/ED): Performed by: CLINICAL NURSE SPECIALIST

## 2023-01-01 PROCEDURE — 99233 SBSQ HOSP IP/OBS HIGH 50: CPT | Performed by: NURSE PRACTITIONER

## 2023-01-01 PROCEDURE — 2500000001 HC RX 250 WO HCPCS SELF ADMINISTERED DRUGS (ALT 637 FOR MEDICARE OP): Performed by: EMERGENCY MEDICINE

## 2023-01-01 PROCEDURE — 2500000004 HC RX 250 GENERAL PHARMACY W/ HCPCS (ALT 636 FOR OP/ED): Performed by: INTERNAL MEDICINE

## 2023-01-01 PROCEDURE — 2500000002 HC RX 250 W HCPCS SELF ADMINISTERED DRUGS (ALT 637 FOR MEDICARE OP, ALT 636 FOR OP/ED): Performed by: HOSPITALIST

## 2023-01-01 PROCEDURE — 94667 MNPJ CHEST WALL 1ST: CPT

## 2023-01-01 PROCEDURE — 99232 SBSQ HOSP IP/OBS MODERATE 35: CPT | Performed by: NURSE PRACTITIONER

## 2023-01-01 PROCEDURE — 99285 EMERGENCY DEPT VISIT HI MDM: CPT | Performed by: EMERGENCY MEDICINE

## 2023-01-01 PROCEDURE — 36415 COLL VENOUS BLD VENIPUNCTURE: CPT

## 2023-01-01 PROCEDURE — 82805 BLOOD GASES W/O2 SATURATION: CPT | Performed by: EMERGENCY MEDICINE

## 2023-01-01 PROCEDURE — 99152 MOD SED SAME PHYS/QHP 5/>YRS: CPT | Performed by: RADIOLOGY

## 2023-01-01 PROCEDURE — 97163 PT EVAL HIGH COMPLEX 45 MIN: CPT | Mod: GP | Performed by: PHYSICAL THERAPIST

## 2023-01-01 PROCEDURE — 88360 TUMOR IMMUNOHISTOCHEM/MANUAL: CPT | Performed by: STUDENT IN AN ORGANIZED HEALTH CARE EDUCATION/TRAINING PROGRAM

## 2023-01-01 PROCEDURE — 81003 URINALYSIS AUTO W/O SCOPE: CPT

## 2023-01-01 PROCEDURE — 2500000001 HC RX 250 WO HCPCS SELF ADMINISTERED DRUGS (ALT 637 FOR MEDICARE OP): Performed by: HOSPITALIST

## 2023-01-01 PROCEDURE — 85025 COMPLETE CBC W/AUTO DIFF WBC: CPT

## 2023-01-01 PROCEDURE — 71045 X-RAY EXAM CHEST 1 VIEW: CPT | Performed by: RADIOLOGY

## 2023-01-01 PROCEDURE — 99309 SBSQ NF CARE MODERATE MDM 30: CPT | Performed by: INTERNAL MEDICINE

## 2023-01-01 PROCEDURE — 85027 COMPLETE CBC AUTOMATED: CPT | Performed by: CLINICAL NURSE SPECIALIST

## 2023-01-01 PROCEDURE — 2500000004 HC RX 250 GENERAL PHARMACY W/ HCPCS (ALT 636 FOR OP/ED): Performed by: HOSPITALIST

## 2023-01-01 PROCEDURE — 96372 THER/PROPH/DIAG INJ SC/IM: CPT | Performed by: INTERNAL MEDICINE

## 2023-01-01 PROCEDURE — 36415 COLL VENOUS BLD VENIPUNCTURE: CPT | Performed by: STUDENT IN AN ORGANIZED HEALTH CARE EDUCATION/TRAINING PROGRAM

## 2023-01-01 PROCEDURE — 84484 ASSAY OF TROPONIN QUANT: CPT | Performed by: EMERGENCY MEDICINE

## 2023-01-01 PROCEDURE — 87635 SARS-COV-2 COVID-19 AMP PRB: CPT | Performed by: EMERGENCY MEDICINE

## 2023-01-01 PROCEDURE — 97535 SELF CARE MNGMENT TRAINING: CPT | Mod: GO

## 2023-01-01 PROCEDURE — 2500000001 HC RX 250 WO HCPCS SELF ADMINISTERED DRUGS (ALT 637 FOR MEDICARE OP): Performed by: NURSE PRACTITIONER

## 2023-01-01 PROCEDURE — 96374 THER/PROPH/DIAG INJ IV PUSH: CPT

## 2023-01-01 PROCEDURE — 3430000001 HC RX 343 DIAGNOSTIC RADIOPHARMACEUTICALS: Performed by: INTERNAL MEDICINE

## 2023-01-01 PROCEDURE — 36415 COLL VENOUS BLD VENIPUNCTURE: CPT | Performed by: INTERNAL MEDICINE

## 2023-01-01 PROCEDURE — 0BBC3ZX EXCISION OF RIGHT UPPER LUNG LOBE, PERCUTANEOUS APPROACH, DIAGNOSTIC: ICD-10-PCS | Performed by: RADIOLOGY

## 2023-01-01 PROCEDURE — 99232 SBSQ HOSP IP/OBS MODERATE 35: CPT | Performed by: INTERNAL MEDICINE

## 2023-01-01 PROCEDURE — 94640 AIRWAY INHALATION TREATMENT: CPT

## 2023-01-01 PROCEDURE — 36415 COLL VENOUS BLD VENIPUNCTURE: CPT | Performed by: CLINICAL NURSE SPECIALIST

## 2023-01-01 PROCEDURE — 3078F DIAST BP <80 MM HG: CPT | Performed by: INTERNAL MEDICINE

## 2023-01-01 PROCEDURE — 2500000005 HC RX 250 GENERAL PHARMACY W/O HCPCS

## 2023-01-01 PROCEDURE — 99223 1ST HOSP IP/OBS HIGH 75: CPT | Performed by: INTERNAL MEDICINE

## 2023-01-01 PROCEDURE — 2500000004 HC RX 250 GENERAL PHARMACY W/ HCPCS (ALT 636 FOR OP/ED): Performed by: RADIOLOGY

## 2023-01-01 PROCEDURE — 1036F TOBACCO NON-USER: CPT | Performed by: INTERNAL MEDICINE

## 2023-01-01 PROCEDURE — 2500000004 HC RX 250 GENERAL PHARMACY W/ HCPCS (ALT 636 FOR OP/ED)

## 2023-01-01 PROCEDURE — 71045 X-RAY EXAM CHEST 1 VIEW: CPT | Mod: FY

## 2023-01-01 PROCEDURE — 85025 COMPLETE CBC W/AUTO DIFF WBC: CPT | Performed by: INTERNAL MEDICINE

## 2023-01-01 PROCEDURE — 2500000005 HC RX 250 GENERAL PHARMACY W/O HCPCS: Performed by: RADIOLOGY

## 2023-01-01 PROCEDURE — 73030 X-RAY EXAM OF SHOULDER: CPT | Mod: RT,FR

## 2023-01-01 PROCEDURE — 87449 NOS EACH ORGANISM AG IA: CPT | Mod: AHULAB,CMCLAB | Performed by: INTERNAL MEDICINE

## 2023-01-01 PROCEDURE — 74177 CT ABD & PELVIS W/CONTRAST: CPT | Performed by: RADIOLOGY

## 2023-01-01 PROCEDURE — 96372 THER/PROPH/DIAG INJ SC/IM: CPT | Performed by: NURSE PRACTITIONER

## 2023-01-01 PROCEDURE — 73030 X-RAY EXAM OF SHOULDER: CPT | Mod: RIGHT SIDE | Performed by: RADIOLOGY

## 2023-01-01 PROCEDURE — 73502 X-RAY EXAM HIP UNI 2-3 VIEWS: CPT | Mod: RIGHT SIDE | Performed by: RADIOLOGY

## 2023-01-01 PROCEDURE — 70553 MRI BRAIN STEM W/O & W/DYE: CPT | Performed by: RADIOLOGY

## 2023-01-01 PROCEDURE — 76705 ECHO EXAM OF ABDOMEN: CPT

## 2023-01-01 PROCEDURE — 87340 HEPATITIS B SURFACE AG IA: CPT

## 2023-01-01 PROCEDURE — 87899 AGENT NOS ASSAY W/OPTIC: CPT | Mod: AHULAB,CMCLAB | Performed by: INTERNAL MEDICINE

## 2023-01-01 PROCEDURE — 88305 TISSUE EXAM BY PATHOLOGIST: CPT | Performed by: STUDENT IN AN ORGANIZED HEALTH CARE EDUCATION/TRAINING PROGRAM

## 2023-01-01 PROCEDURE — 85025 COMPLETE CBC W/AUTO DIFF WBC: CPT | Performed by: STUDENT IN AN ORGANIZED HEALTH CARE EDUCATION/TRAINING PROGRAM

## 2023-01-01 PROCEDURE — 94760 N-INVAS EAR/PLS OXIMETRY 1: CPT

## 2023-01-01 PROCEDURE — 93306 TTE W/DOPPLER COMPLETE: CPT

## 2023-01-01 PROCEDURE — 87075 CULTR BACTERIA EXCEPT BLOOD: CPT | Mod: AHULAB,CMCLAB | Performed by: EMERGENCY MEDICINE

## 2023-01-01 PROCEDURE — 99239 HOSP IP/OBS DSCHRG MGMT >30: CPT | Performed by: INTERNAL MEDICINE

## 2023-01-01 PROCEDURE — 2500000002 HC RX 250 W HCPCS SELF ADMINISTERED DRUGS (ALT 637 FOR MEDICARE OP, ALT 636 FOR OP/ED): Performed by: INTERNAL MEDICINE

## 2023-01-01 PROCEDURE — 2500000004 HC RX 250 GENERAL PHARMACY W/ HCPCS (ALT 636 FOR OP/ED): Performed by: NURSE PRACTITIONER

## 2023-01-01 PROCEDURE — 84443 ASSAY THYROID STIM HORMONE: CPT

## 2023-01-01 PROCEDURE — 83605 ASSAY OF LACTIC ACID: CPT | Performed by: EMERGENCY MEDICINE

## 2023-01-01 PROCEDURE — 82947 ASSAY GLUCOSE BLOOD QUANT: CPT

## 2023-01-01 PROCEDURE — 71046 X-RAY EXAM CHEST 2 VIEWS: CPT | Performed by: RADIOLOGY

## 2023-01-01 PROCEDURE — 85025 COMPLETE CBC W/AUTO DIFF WBC: CPT | Performed by: EMERGENCY MEDICINE

## 2023-01-01 PROCEDURE — 1150000001 HC HOSPICE PRIVATE ROOM DAILY

## 2023-01-01 PROCEDURE — 99215 OFFICE O/P EST HI 40 MIN: CPT | Performed by: INTERNAL MEDICINE

## 2023-01-01 PROCEDURE — 80048 BASIC METABOLIC PNL TOTAL CA: CPT | Performed by: CLINICAL NURSE SPECIALIST

## 2023-01-01 PROCEDURE — 96375 TX/PRO/DX INJ NEW DRUG ADDON: CPT

## 2023-01-01 PROCEDURE — 36415 COLL VENOUS BLD VENIPUNCTURE: CPT | Performed by: EMERGENCY MEDICINE

## 2023-01-01 PROCEDURE — 1125F AMNT PAIN NOTED PAIN PRSNT: CPT | Performed by: INTERNAL MEDICINE

## 2023-01-01 PROCEDURE — 85027 COMPLETE CBC AUTOMATED: CPT | Performed by: INTERNAL MEDICINE

## 2023-01-01 PROCEDURE — 88341 IMHCHEM/IMCYTCHM EA ADD ANTB: CPT | Mod: TC,SUR | Performed by: NURSE PRACTITIONER

## 2023-01-01 PROCEDURE — 73610 X-RAY EXAM OF ANKLE: CPT | Mod: RT,FR

## 2023-01-01 PROCEDURE — 80048 BASIC METABOLIC PNL TOTAL CA: CPT | Performed by: NURSE PRACTITIONER

## 2023-01-01 PROCEDURE — 1170000001 HC PRIVATE ONCOLOGY ROOM DAILY

## 2023-01-01 PROCEDURE — 3074F SYST BP LT 130 MM HG: CPT | Performed by: INTERNAL MEDICINE

## 2023-01-01 PROCEDURE — 99309 SBSQ NF CARE MODERATE MDM 30: CPT | Performed by: NURSE PRACTITIONER

## 2023-01-01 PROCEDURE — 84132 ASSAY OF SERUM POTASSIUM: CPT | Performed by: HOSPITALIST

## 2023-01-01 PROCEDURE — 78815 PET IMAGE W/CT SKULL-THIGH: CPT | Mod: PET TUMOR INIT TX STRAT | Performed by: STUDENT IN AN ORGANIZED HEALTH CARE EDUCATION/TRAINING PROGRAM

## 2023-01-01 PROCEDURE — 80048 BASIC METABOLIC PNL TOTAL CA: CPT | Performed by: INTERNAL MEDICINE

## 2023-01-01 PROCEDURE — A9540 TC99M MAA: HCPCS

## 2023-01-01 PROCEDURE — 71250 CT THORAX DX C-: CPT | Mod: RSC | Performed by: RADIOLOGY

## 2023-01-01 PROCEDURE — 96367 TX/PROPH/DG ADDL SEQ IV INF: CPT

## 2023-01-01 PROCEDURE — 99232 SBSQ HOSP IP/OBS MODERATE 35: CPT | Performed by: CLINICAL NURSE SPECIALIST

## 2023-01-01 PROCEDURE — 99233 SBSQ HOSP IP/OBS HIGH 50: CPT | Performed by: INTERNAL MEDICINE

## 2023-01-01 PROCEDURE — 71045 X-RAY EXAM CHEST 1 VIEW: CPT | Mod: FR

## 2023-01-01 PROCEDURE — G0452 MOLECULAR PATHOLOGY INTERPR: HCPCS | Performed by: NURSE PRACTITIONER

## 2023-01-01 PROCEDURE — 84295 ASSAY OF SERUM SODIUM: CPT | Performed by: EMERGENCY MEDICINE

## 2023-01-01 PROCEDURE — 99305 1ST NF CARE MODERATE MDM 35: CPT | Performed by: INTERNAL MEDICINE

## 2023-01-01 PROCEDURE — 99239 HOSP IP/OBS DSCHRG MGMT >30: CPT | Performed by: NURSE PRACTITIONER

## 2023-01-01 PROCEDURE — 73000 X-RAY EXAM OF COLLAR BONE: CPT | Mod: RT,FY,FR

## 2023-01-01 PROCEDURE — 85027 COMPLETE CBC AUTOMATED: CPT | Performed by: NURSE PRACTITIONER

## 2023-01-01 PROCEDURE — 80053 COMPREHEN METABOLIC PANEL: CPT | Performed by: NURSE PRACTITIONER

## 2023-01-01 PROCEDURE — 80053 COMPREHEN METABOLIC PANEL: CPT | Performed by: STUDENT IN AN ORGANIZED HEALTH CARE EDUCATION/TRAINING PROGRAM

## 2023-01-01 PROCEDURE — 84145 PROCALCITONIN (PCT): CPT | Mod: AHULAB,CMCLAB | Performed by: CLINICAL NURSE SPECIALIST

## 2023-01-01 PROCEDURE — 2500000005 HC RX 250 GENERAL PHARMACY W/O HCPCS: Performed by: NURSE PRACTITIONER

## 2023-01-01 PROCEDURE — 72125 CT NECK SPINE W/O DYE: CPT | Performed by: RADIOLOGY

## 2023-01-01 PROCEDURE — 87636 SARSCOV2 & INF A&B AMP PRB: CPT | Performed by: STUDENT IN AN ORGANIZED HEALTH CARE EDUCATION/TRAINING PROGRAM

## 2023-01-01 PROCEDURE — 99223 1ST HOSP IP/OBS HIGH 75: CPT | Performed by: NURSE PRACTITIONER

## 2023-01-01 PROCEDURE — 71045 X-RAY EXAM CHEST 1 VIEW: CPT | Performed by: STUDENT IN AN ORGANIZED HEALTH CARE EDUCATION/TRAINING PROGRAM

## 2023-01-01 PROCEDURE — 85520 HEPARIN ASSAY: CPT | Performed by: INTERNAL MEDICINE

## 2023-01-01 PROCEDURE — 80053 COMPREHEN METABOLIC PANEL: CPT

## 2023-01-01 PROCEDURE — 84132 ASSAY OF SERUM POTASSIUM: CPT

## 2023-01-01 PROCEDURE — 82607 VITAMIN B-12: CPT | Mod: AHULAB,CMCLAB | Performed by: INTERNAL MEDICINE

## 2023-01-01 PROCEDURE — 70450 CT HEAD/BRAIN W/O DYE: CPT | Performed by: RADIOLOGY

## 2023-01-01 PROCEDURE — 99223 1ST HOSP IP/OBS HIGH 75: CPT

## 2023-01-01 PROCEDURE — 80048 BASIC METABOLIC PNL TOTAL CA: CPT | Performed by: HOSPITALIST

## 2023-01-01 PROCEDURE — 99233 SBSQ HOSP IP/OBS HIGH 50: CPT

## 2023-01-01 PROCEDURE — 1159F MED LIST DOCD IN RCRD: CPT | Performed by: INTERNAL MEDICINE

## 2023-01-01 PROCEDURE — 88363 XM ARCHIVE TISSUE MOLEC ANAL: CPT | Performed by: STUDENT IN AN ORGANIZED HEALTH CARE EDUCATION/TRAINING PROGRAM

## 2023-01-01 PROCEDURE — 86706 HEP B SURFACE ANTIBODY: CPT

## 2023-01-01 PROCEDURE — 94668 MNPJ CHEST WALL SBSQ: CPT

## 2023-01-01 PROCEDURE — 83735 ASSAY OF MAGNESIUM: CPT | Performed by: NURSE PRACTITIONER

## 2023-01-01 PROCEDURE — 82024 ASSAY OF ACTH: CPT

## 2023-01-01 PROCEDURE — 5A0945A ASSISTANCE WITH RESPIRATORY VENTILATION, 24-96 CONSECUTIVE HOURS, HIGH NASAL FLOW/VELOCITY: ICD-10-PCS | Performed by: NURSE PRACTITIONER

## 2023-01-01 PROCEDURE — 1111F DSCHRG MED/CURRENT MED MERGE: CPT | Performed by: INTERNAL MEDICINE

## 2023-01-01 PROCEDURE — 70450 CT HEAD/BRAIN W/O DYE: CPT | Mod: MG

## 2023-01-01 PROCEDURE — 92610 EVALUATE SWALLOWING FUNCTION: CPT | Mod: GN

## 2023-01-01 PROCEDURE — 83880 ASSAY OF NATRIURETIC PEPTIDE: CPT | Performed by: NURSE PRACTITIONER

## 2023-01-01 PROCEDURE — 99285 EMERGENCY DEPT VISIT HI MDM: CPT | Mod: 25 | Performed by: EMERGENCY MEDICINE

## 2023-01-01 PROCEDURE — 88360 TUMOR IMMUNOHISTOCHEM/MANUAL: CPT | Mod: TC | Performed by: NURSE PRACTITIONER

## 2023-01-01 PROCEDURE — 74230 X-RAY XM SWLNG FUNCJ C+: CPT | Performed by: RADIOLOGY

## 2023-01-01 PROCEDURE — 2500000001 HC RX 250 WO HCPCS SELF ADMINISTERED DRUGS (ALT 637 FOR MEDICARE OP): Performed by: INTERNAL MEDICINE

## 2023-01-01 PROCEDURE — 99232 SBSQ HOSP IP/OBS MODERATE 35: CPT

## 2023-01-01 PROCEDURE — 82728 ASSAY OF FERRITIN: CPT | Mod: AHULAB,CMCLAB | Performed by: INTERNAL MEDICINE

## 2023-01-01 PROCEDURE — 73502 X-RAY EXAM HIP UNI 2-3 VIEWS: CPT | Mod: RT,FY,FR

## 2023-01-01 PROCEDURE — 99205 OFFICE O/P NEW HI 60 MIN: CPT | Performed by: RADIOLOGY

## 2023-01-01 PROCEDURE — 71045 X-RAY EXAM CHEST 1 VIEW: CPT

## 2023-01-01 PROCEDURE — 74177 CT ABD & PELVIS W/CONTRAST: CPT | Mod: ME

## 2023-01-01 PROCEDURE — 82728 ASSAY OF FERRITIN: CPT | Performed by: INTERNAL MEDICINE

## 2023-01-01 PROCEDURE — 93005 ELECTROCARDIOGRAM TRACING: CPT

## 2023-01-01 PROCEDURE — 2500000004 HC RX 250 GENERAL PHARMACY W/ HCPCS (ALT 636 FOR OP/ED): Performed by: EMERGENCY MEDICINE

## 2023-01-01 PROCEDURE — 71260 CT THORAX DX C+: CPT | Mod: MG

## 2023-01-01 PROCEDURE — 99285 EMERGENCY DEPT VISIT HI MDM: CPT | Performed by: GENERAL PRACTICE

## 2023-01-01 PROCEDURE — 32408 CORE NDL BX LNG/MED PERQ: CPT | Performed by: RADIOLOGY

## 2023-01-01 PROCEDURE — 83880 ASSAY OF NATRIURETIC PEPTIDE: CPT | Performed by: GENERAL PRACTICE

## 2023-01-01 PROCEDURE — 97165 OT EVAL LOW COMPLEX 30 MIN: CPT | Mod: GO

## 2023-01-01 PROCEDURE — 78830 RP LOCLZJ TUM SPECT W/CT 1: CPT | Performed by: RADIOLOGY

## 2023-01-01 PROCEDURE — 86704 HEP B CORE ANTIBODY TOTAL: CPT

## 2023-01-01 PROCEDURE — 80202 ASSAY OF VANCOMYCIN: CPT | Performed by: HOSPITALIST

## 2023-01-01 PROCEDURE — 2500000005 HC RX 250 GENERAL PHARMACY W/O HCPCS: Performed by: INTERNAL MEDICINE

## 2023-01-01 PROCEDURE — 72128 CT CHEST SPINE W/O DYE: CPT | Mod: RSC | Performed by: RADIOLOGY

## 2023-01-01 PROCEDURE — 84484 ASSAY OF TROPONIN QUANT: CPT | Performed by: HOSPITALIST

## 2023-01-01 PROCEDURE — 83540 ASSAY OF IRON: CPT | Performed by: INTERNAL MEDICINE

## 2023-01-01 PROCEDURE — 71045 X-RAY EXAM CHEST 1 VIEW: CPT | Mod: FOREIGN READ | Performed by: RADIOLOGY

## 2023-01-01 PROCEDURE — 36415 COLL VENOUS BLD VENIPUNCTURE: CPT | Performed by: HOSPITALIST

## 2023-01-01 PROCEDURE — 99215 OFFICE O/P EST HI 40 MIN: CPT | Mod: GC | Performed by: RADIOLOGY

## 2023-01-01 PROCEDURE — 78815 PET IMAGE W/CT SKULL-THIGH: CPT | Mod: PI

## 2023-01-01 PROCEDURE — 71250 CT THORAX DX C-: CPT | Mod: FR

## 2023-01-01 PROCEDURE — A9552 F18 FDG: HCPCS | Performed by: INTERNAL MEDICINE

## 2023-01-01 PROCEDURE — 3430000001 HC RX 343 DIAGNOSTIC RADIOPHARMACEUTICALS

## 2023-01-01 PROCEDURE — 36600 WITHDRAWAL OF ARTERIAL BLOOD: CPT

## 2023-01-01 PROCEDURE — 78803 RP LOCLZJ TUM SPECT 1 AREA: CPT

## 2023-01-01 PROCEDURE — 86901 BLOOD TYPING SEROLOGIC RH(D): CPT | Performed by: INTERNAL MEDICINE

## 2023-01-01 PROCEDURE — 99223 1ST HOSP IP/OBS HIGH 75: CPT | Performed by: HOSPITALIST

## 2023-01-01 PROCEDURE — 82746 ASSAY OF FOLIC ACID SERUM: CPT | Mod: AHULAB,CMCLAB | Performed by: INTERNAL MEDICINE

## 2023-01-01 PROCEDURE — 96372 THER/PROPH/DIAG INJ SC/IM: CPT | Performed by: RADIOLOGY

## 2023-01-01 PROCEDURE — 70450 CT HEAD/BRAIN W/O DYE: CPT

## 2023-01-01 PROCEDURE — 99231 SBSQ HOSP IP/OBS SF/LOW 25: CPT

## 2023-01-01 PROCEDURE — 73610 X-RAY EXAM OF ANKLE: CPT | Mod: RIGHT SIDE | Performed by: RADIOLOGY

## 2023-01-01 PROCEDURE — 70450 CT HEAD/BRAIN W/O DYE: CPT | Performed by: STUDENT IN AN ORGANIZED HEALTH CARE EDUCATION/TRAINING PROGRAM

## 2023-01-01 PROCEDURE — 93306 TTE W/DOPPLER COMPLETE: CPT | Performed by: INTERNAL MEDICINE

## 2023-01-01 PROCEDURE — 87075 CULTR BACTERIA EXCEPT BLOOD: CPT | Mod: AHULAB,CMCLAB | Performed by: STUDENT IN AN ORGANIZED HEALTH CARE EDUCATION/TRAINING PROGRAM

## 2023-01-01 PROCEDURE — 81445 SO NEO GSAP 5-50DNA/DNA&RNA: CPT | Performed by: NURSE PRACTITIONER

## 2023-01-01 PROCEDURE — 99221 1ST HOSP IP/OBS SF/LOW 40: CPT | Performed by: PHYSICIAN ASSISTANT

## 2023-01-01 PROCEDURE — 72128 CT CHEST SPINE W/O DYE: CPT | Mod: RSC,FR

## 2023-01-01 PROCEDURE — 87040 BLOOD CULTURE FOR BACTERIA: CPT | Mod: AHULAB,CMCLAB | Performed by: EMERGENCY MEDICINE

## 2023-01-01 PROCEDURE — A4217 STERILE WATER/SALINE, 500 ML: HCPCS

## 2023-01-01 PROCEDURE — 87641 MR-STAPH DNA AMP PROBE: CPT | Performed by: EMERGENCY MEDICINE

## 2023-01-01 PROCEDURE — 73000 X-RAY EXAM OF COLLAR BONE: CPT | Mod: RIGHT SIDE | Performed by: RADIOLOGY

## 2023-01-01 PROCEDURE — 2500000004 HC RX 250 GENERAL PHARMACY W/ HCPCS (ALT 636 FOR OP/ED): Performed by: STUDENT IN AN ORGANIZED HEALTH CARE EDUCATION/TRAINING PROGRAM

## 2023-01-01 PROCEDURE — 71046 X-RAY EXAM CHEST 2 VIEWS: CPT

## 2023-01-01 PROCEDURE — 93010 ELECTROCARDIOGRAM REPORT: CPT | Performed by: EMERGENCY MEDICINE

## 2023-01-01 PROCEDURE — 71260 CT THORAX DX C+: CPT | Performed by: RADIOLOGY

## 2023-01-01 PROCEDURE — 86481 TB AG RESPONSE T-CELL SUSP: CPT | Performed by: INTERNAL MEDICINE

## 2023-01-01 PROCEDURE — 74230 X-RAY XM SWLNG FUNCJ C+: CPT

## 2023-01-01 PROCEDURE — 99239 HOSP IP/OBS DSCHRG MGMT >30: CPT | Performed by: STUDENT IN AN ORGANIZED HEALTH CARE EDUCATION/TRAINING PROGRAM

## 2023-01-01 PROCEDURE — 86850 RBC ANTIBODY SCREEN: CPT | Performed by: INTERNAL MEDICINE

## 2023-01-01 PROCEDURE — 88342 IMHCHEM/IMCYTCHM 1ST ANTB: CPT | Performed by: STUDENT IN AN ORGANIZED HEALTH CARE EDUCATION/TRAINING PROGRAM

## 2023-01-01 PROCEDURE — 82805 BLOOD GASES W/O2 SATURATION: CPT

## 2023-01-01 PROCEDURE — 72131 CT LUMBAR SPINE W/O DYE: CPT | Mod: RSC | Performed by: RADIOLOGY

## 2023-01-01 PROCEDURE — 71275 CT ANGIOGRAPHY CHEST: CPT | Performed by: STUDENT IN AN ORGANIZED HEALTH CARE EDUCATION/TRAINING PROGRAM

## 2023-01-01 PROCEDURE — 96372 THER/PROPH/DIAG INJ SC/IM: CPT | Performed by: CLINICAL NURSE SPECIALIST

## 2023-01-01 PROCEDURE — 84484 ASSAY OF TROPONIN QUANT: CPT | Performed by: GENERAL PRACTICE

## 2023-01-01 PROCEDURE — 72125 CT NECK SPINE W/O DYE: CPT

## 2023-01-01 PROCEDURE — 99152 MOD SED SAME PHYS/QHP 5/>YRS: CPT

## 2023-01-01 PROCEDURE — 87040 BLOOD CULTURE FOR BACTERIA: CPT | Mod: AHULAB,CMCLAB | Performed by: STUDENT IN AN ORGANIZED HEALTH CARE EDUCATION/TRAINING PROGRAM

## 2023-01-01 PROCEDURE — A9575 INJ GADOTERATE MEGLUMI 0.1ML: HCPCS | Performed by: INTERNAL MEDICINE

## 2023-01-01 PROCEDURE — 85610 PROTHROMBIN TIME: CPT

## 2023-01-01 PROCEDURE — 78830 RP LOCLZJ TUM SPECT W/CT 1: CPT

## 2023-01-01 PROCEDURE — 2500000004 HC RX 250 GENERAL PHARMACY W/ HCPCS (ALT 636 FOR OP/ED): Performed by: PHYSICIAN ASSISTANT

## 2023-01-01 PROCEDURE — 70553 MRI BRAIN STEM W/O & W/DYE: CPT | Mod: ME

## 2023-01-01 PROCEDURE — 96365 THER/PROPH/DIAG IV INF INIT: CPT

## 2023-01-01 PROCEDURE — 86901 BLOOD TYPING SEROLOGIC RH(D): CPT

## 2023-01-01 PROCEDURE — 76705 ECHO EXAM OF ABDOMEN: CPT | Performed by: RADIOLOGY

## 2023-01-01 PROCEDURE — 71275 CT ANGIOGRAPHY CHEST: CPT | Mod: ME

## 2023-01-01 PROCEDURE — 97530 THERAPEUTIC ACTIVITIES: CPT | Mod: GP | Performed by: PHYSICAL THERAPIST

## 2023-01-01 PROCEDURE — 99223 1ST HOSP IP/OBS HIGH 75: CPT | Performed by: CLINICAL NURSE SPECIALIST

## 2023-01-01 PROCEDURE — 72131 CT LUMBAR SPINE W/O DYE: CPT | Mod: RSC,FR

## 2023-01-01 PROCEDURE — 86140 C-REACTIVE PROTEIN: CPT | Performed by: INTERNAL MEDICINE

## 2023-01-01 PROCEDURE — 32408 CORE NDL BX LNG/MED PERQ: CPT

## 2023-01-01 PROCEDURE — 83880 ASSAY OF NATRIURETIC PEPTIDE: CPT

## 2023-01-01 PROCEDURE — 85379 FIBRIN DEGRADATION QUANT: CPT | Performed by: NURSE PRACTITIONER

## 2023-01-01 PROCEDURE — 87040 BLOOD CULTURE FOR BACTERIA: CPT

## 2023-01-01 PROCEDURE — 99254 IP/OBS CNSLTJ NEW/EST MOD 60: CPT

## 2023-01-01 PROCEDURE — 36415 COLL VENOUS BLD VENIPUNCTURE: CPT | Performed by: NURSE PRACTITIONER

## 2023-01-01 PROCEDURE — 83718 ASSAY OF LIPOPROTEIN: CPT | Performed by: NURSE PRACTITIONER

## 2023-01-01 PROCEDURE — 2500000001 HC RX 250 WO HCPCS SELF ADMINISTERED DRUGS (ALT 637 FOR MEDICARE OP): Performed by: CLINICAL NURSE SPECIALIST

## 2023-01-01 PROCEDURE — 92611 MOTION FLUOROSCOPY/SWALLOW: CPT | Mod: GN

## 2023-01-01 PROCEDURE — 2500000002 HC RX 250 W HCPCS SELF ADMINISTERED DRUGS (ALT 637 FOR MEDICARE OP, ALT 636 FOR OP/ED): Performed by: STUDENT IN AN ORGANIZED HEALTH CARE EDUCATION/TRAINING PROGRAM

## 2023-01-01 PROCEDURE — 77012 CT SCAN FOR NEEDLE BIOPSY: CPT

## 2023-01-01 PROCEDURE — 74176 CT ABD & PELVIS W/O CONTRAST: CPT | Mod: RSC | Performed by: RADIOLOGY

## 2023-01-01 PROCEDURE — 84443 ASSAY THYROID STIM HORMONE: CPT | Performed by: NURSE PRACTITIONER

## 2023-01-01 PROCEDURE — 70491 CT SOFT TISSUE NECK W/DYE: CPT | Performed by: RADIOLOGY

## 2023-01-01 PROCEDURE — 36415 COLL VENOUS BLD VENIPUNCTURE: CPT | Mod: AHULAB | Performed by: CLINICAL NURSE SPECIALIST

## 2023-01-01 RX ORDER — POLYETHYLENE GLYCOL 3350 17 G/17G
17 POWDER, FOR SOLUTION ORAL DAILY
Qty: 30 PACKET | Refills: 0 | Status: SHIPPED | OUTPATIENT
Start: 2023-01-01 | End: 2023-01-01

## 2023-01-01 RX ORDER — HYDRALAZINE HYDROCHLORIDE 20 MG/ML
10 INJECTION INTRAMUSCULAR; INTRAVENOUS ONCE
Status: COMPLETED | OUTPATIENT
Start: 2023-01-01 | End: 2023-01-01

## 2023-01-01 RX ORDER — GABAPENTIN 100 MG/1
200 CAPSULE ORAL 3 TIMES DAILY
Status: ON HOLD | COMMUNITY
End: 2023-01-01 | Stop reason: HOSPADM

## 2023-01-01 RX ORDER — ACETAMINOPHEN 500 MG
5 TABLET ORAL NIGHTLY
Status: DISCONTINUED | OUTPATIENT
Start: 2023-01-01 | End: 2023-01-01

## 2023-01-01 RX ORDER — LOSARTAN POTASSIUM 50 MG/1
50 TABLET ORAL DAILY
Status: DISCONTINUED | OUTPATIENT
Start: 2023-01-01 | End: 2023-01-01 | Stop reason: HOSPADM

## 2023-01-01 RX ORDER — TORSEMIDE 10 MG/1
10 TABLET ORAL DAILY
Qty: 30 TABLET | Refills: 1 | Status: ON HOLD | OUTPATIENT
Start: 2023-01-01 | End: 2023-01-01 | Stop reason: HOSPADM

## 2023-01-01 RX ORDER — GABAPENTIN 100 MG/1
200 CAPSULE ORAL 3 TIMES DAILY
Status: DISCONTINUED | OUTPATIENT
Start: 2023-01-01 | End: 2023-01-01 | Stop reason: HOSPADM

## 2023-01-01 RX ORDER — KETOROLAC TROMETHAMINE 15 MG/ML
15 INJECTION, SOLUTION INTRAMUSCULAR; INTRAVENOUS ONCE
Status: COMPLETED | OUTPATIENT
Start: 2023-01-01 | End: 2023-01-01

## 2023-01-01 RX ORDER — ALBUTEROL SULFATE 90 UG/1
2 AEROSOL, METERED RESPIRATORY (INHALATION) EVERY 6 HOURS PRN
Status: DISCONTINUED | OUTPATIENT
Start: 2023-01-01 | End: 2023-01-01

## 2023-01-01 RX ORDER — ALBUTEROL SULFATE 0.83 MG/ML
2.5 SOLUTION RESPIRATORY (INHALATION) 3 TIMES DAILY
COMMUNITY
Start: 2019-03-25 | End: 2023-01-01 | Stop reason: WASHOUT

## 2023-01-01 RX ORDER — MONTELUKAST SODIUM 10 MG/1
10 TABLET ORAL DAILY
Status: CANCELLED | OUTPATIENT
Start: 2023-01-01

## 2023-01-01 RX ORDER — METOPROLOL SUCCINATE 50 MG/1
25 TABLET, EXTENDED RELEASE ORAL
COMMUNITY
Start: 2017-11-13 | End: 2023-01-01 | Stop reason: WASHOUT

## 2023-01-01 RX ORDER — MIDAZOLAM HYDROCHLORIDE 1 MG/ML
INJECTION INTRAMUSCULAR; INTRAVENOUS AS NEEDED
Status: DISCONTINUED | OUTPATIENT
Start: 2023-01-01 | End: 2023-01-01

## 2023-01-01 RX ORDER — DEXTROSE MONOHYDRATE AND SODIUM CHLORIDE 5; .45 G/100ML; G/100ML
75 INJECTION, SOLUTION INTRAVENOUS CONTINUOUS
Status: DISCONTINUED | OUTPATIENT
Start: 2023-01-01 | End: 2023-01-01

## 2023-01-01 RX ORDER — GABAPENTIN 100 MG/1
2 CAPSULE ORAL 2 TIMES DAILY
Status: ON HOLD | COMMUNITY
Start: 2021-02-10 | End: 2023-01-01 | Stop reason: ALTCHOICE

## 2023-01-01 RX ORDER — FLUTICASONE FUROATE AND VILANTEROL 200; 25 UG/1; UG/1
1 POWDER RESPIRATORY (INHALATION)
Status: DISCONTINUED | OUTPATIENT
Start: 2023-01-01 | End: 2023-01-01 | Stop reason: CLARIF

## 2023-01-01 RX ORDER — MAGNESIUM SULFATE HEPTAHYDRATE 40 MG/ML
2 INJECTION, SOLUTION INTRAVENOUS ONCE
Status: COMPLETED | OUTPATIENT
Start: 2023-01-01 | End: 2023-01-01

## 2023-01-01 RX ORDER — DOXYCYCLINE 100 MG/1
CAPSULE ORAL
Status: ON HOLD | COMMUNITY
Start: 2020-07-23 | End: 2023-01-01 | Stop reason: ALTCHOICE

## 2023-01-01 RX ORDER — ALBUTEROL SULFATE 90 UG/1
2 AEROSOL, METERED RESPIRATORY (INHALATION) EVERY 6 HOURS PRN
Status: ON HOLD | COMMUNITY
Start: 2018-01-16 | End: 2023-01-01 | Stop reason: HOSPADM

## 2023-01-01 RX ORDER — DICLOFENAC SODIUM 10 MG/G
4 GEL TOPICAL 2 TIMES DAILY PRN
Status: DISCONTINUED | OUTPATIENT
Start: 2023-01-01 | End: 2023-01-01

## 2023-01-01 RX ORDER — MIRTAZAPINE 7.5 MG/1
7.5 TABLET, FILM COATED ORAL NIGHTLY
COMMUNITY
End: 2023-01-01 | Stop reason: WASHOUT

## 2023-01-01 RX ORDER — ATORVASTATIN CALCIUM 40 MG/1
40 TABLET, FILM COATED ORAL NIGHTLY
Status: DISCONTINUED | OUTPATIENT
Start: 2023-01-01 | End: 2023-01-01 | Stop reason: HOSPADM

## 2023-01-01 RX ORDER — MONTELUKAST SODIUM 10 MG/1
10 TABLET ORAL DAILY
Status: DISCONTINUED | OUTPATIENT
Start: 2023-01-01 | End: 2023-01-01 | Stop reason: HOSPADM

## 2023-01-01 RX ORDER — ONDANSETRON 4 MG/1
4 TABLET, FILM COATED ORAL EVERY 8 HOURS PRN
Status: DISCONTINUED | OUTPATIENT
Start: 2023-01-01 | End: 2023-01-01 | Stop reason: HOSPADM

## 2023-01-01 RX ORDER — HYDROMORPHONE HYDROCHLORIDE 1 MG/ML
0.5 INJECTION, SOLUTION INTRAMUSCULAR; INTRAVENOUS; SUBCUTANEOUS
Status: DISCONTINUED | OUTPATIENT
Start: 2023-01-01 | End: 2023-01-01

## 2023-01-01 RX ORDER — LIDOCAINE 560 MG/1
1 PATCH PERCUTANEOUS; TOPICAL; TRANSDERMAL DAILY
Status: DISCONTINUED | OUTPATIENT
Start: 2023-01-01 | End: 2023-01-01 | Stop reason: HOSPADM

## 2023-01-01 RX ORDER — PETROLATUM 420 MG/G
OINTMENT TOPICAL
Status: DISCONTINUED
Start: 2023-01-01 | End: 2023-12-01 | Stop reason: HOSPADM

## 2023-01-01 RX ORDER — ALBUTEROL SULFATE 0.83 MG/ML
2.5 SOLUTION RESPIRATORY (INHALATION) 3 TIMES DAILY
Status: DISCONTINUED | OUTPATIENT
Start: 2023-01-01 | End: 2023-01-01 | Stop reason: HOSPADM

## 2023-01-01 RX ORDER — POLYETHYLENE GLYCOL 3350 17 G/17G
17 POWDER, FOR SOLUTION ORAL DAILY
Status: CANCELLED | OUTPATIENT
Start: 2023-01-01

## 2023-01-01 RX ORDER — VANCOMYCIN HYDROCHLORIDE 750 MG/150ML
750 INJECTION, SOLUTION INTRAVENOUS EVERY 24 HOURS
Status: DISCONTINUED | OUTPATIENT
Start: 2023-01-01 | End: 2023-01-01

## 2023-01-01 RX ORDER — CHOLECALCIFEROL (VITAMIN D3) 25 MCG
2000 TABLET ORAL 2 TIMES DAILY
Status: CANCELLED | OUTPATIENT
Start: 2023-01-01

## 2023-01-01 RX ORDER — METOCLOPRAMIDE 10 MG/1
5 TABLET ORAL 2 TIMES DAILY PRN
Qty: 30 TABLET | Refills: 0 | Status: ON HOLD | OUTPATIENT
Start: 2023-01-01 | End: 2023-01-01 | Stop reason: HOSPADM

## 2023-01-01 RX ORDER — ALBUTEROL SULFATE 0.83 MG/ML
3 SOLUTION RESPIRATORY (INHALATION)
Status: DISCONTINUED | OUTPATIENT
Start: 2023-01-01 | End: 2023-01-01 | Stop reason: HOSPADM

## 2023-01-01 RX ORDER — NAPROXEN SODIUM 220 MG/1
1 TABLET, FILM COATED ORAL DAILY
Status: ON HOLD | COMMUNITY
Start: 2022-10-27 | End: 2023-01-01 | Stop reason: HOSPADM

## 2023-01-01 RX ORDER — ACETAMINOPHEN 325 MG/1
650 TABLET ORAL EVERY 4 HOURS PRN
Status: DISCONTINUED | OUTPATIENT
Start: 2023-01-01 | End: 2023-01-01 | Stop reason: HOSPADM

## 2023-01-01 RX ORDER — BISACODYL 10 MG/1
1 SUPPOSITORY RECTAL ONCE AS NEEDED
Status: ON HOLD | COMMUNITY
End: 2023-01-01 | Stop reason: HOSPADM

## 2023-01-01 RX ORDER — MONTELUKAST SODIUM 10 MG/1
10 TABLET ORAL DAILY
Status: ON HOLD | COMMUNITY
End: 2023-01-01 | Stop reason: HOSPADM

## 2023-01-01 RX ORDER — PROCHLORPERAZINE EDISYLATE 5 MG/ML
10 INJECTION INTRAMUSCULAR; INTRAVENOUS EVERY 6 HOURS PRN
Status: CANCELLED | OUTPATIENT
Start: 2023-12-20

## 2023-01-01 RX ORDER — HYDRALAZINE HYDROCHLORIDE 20 MG/ML
5 INJECTION INTRAMUSCULAR; INTRAVENOUS ONCE
Status: COMPLETED | OUTPATIENT
Start: 2023-01-01 | End: 2023-01-01

## 2023-01-01 RX ORDER — LANOLIN ALCOHOL/MO/W.PET/CERES
1000 CREAM (GRAM) TOPICAL
Status: ON HOLD | COMMUNITY
Start: 2021-04-10 | End: 2023-01-01 | Stop reason: ALTCHOICE

## 2023-01-01 RX ORDER — CYPROHEPTADINE HYDROCHLORIDE 4 MG/1
TABLET ORAL
Status: ON HOLD | COMMUNITY
Start: 2019-12-16 | End: 2023-01-01 | Stop reason: ALTCHOICE

## 2023-01-01 RX ORDER — MORPHINE SULFATE 2 MG/ML
2 INJECTION, SOLUTION INTRAMUSCULAR; INTRAVENOUS EVERY 30 MIN PRN
Status: DISCONTINUED | OUTPATIENT
Start: 2023-01-01 | End: 2023-01-01

## 2023-01-01 RX ORDER — HYDROXYZINE HYDROCHLORIDE 25 MG/1
TABLET, FILM COATED ORAL
Status: ON HOLD | COMMUNITY
Start: 2019-12-16 | End: 2023-01-01 | Stop reason: ALTCHOICE

## 2023-01-01 RX ORDER — FLUTICASONE PROPIONATE 50 MCG
1 SPRAY, SUSPENSION (ML) NASAL 2 TIMES DAILY
Status: ON HOLD | COMMUNITY
Start: 2019-02-13 | End: 2023-01-01 | Stop reason: ALTCHOICE

## 2023-01-01 RX ORDER — CHOLECALCIFEROL (VITAMIN D3) 50 MCG
50 TABLET ORAL 2 TIMES DAILY
Status: ON HOLD | COMMUNITY
End: 2023-01-01 | Stop reason: HOSPADM

## 2023-01-01 RX ORDER — BENZONATATE 100 MG/1
100 CAPSULE ORAL 3 TIMES DAILY PRN
Status: DISCONTINUED | OUTPATIENT
Start: 2023-01-01 | End: 2023-01-01 | Stop reason: HOSPADM

## 2023-01-01 RX ORDER — PROCHLORPERAZINE MALEATE 10 MG
10 TABLET ORAL EVERY 6 HOURS PRN
Status: CANCELLED | OUTPATIENT
Start: 2023-12-06

## 2023-01-01 RX ORDER — LEVOFLOXACIN 500 MG/1
500 TABLET, FILM COATED ORAL NIGHTLY
Status: ON HOLD | COMMUNITY
Start: 2023-01-01 | End: 2023-01-01 | Stop reason: ALTCHOICE

## 2023-01-01 RX ORDER — PROCHLORPERAZINE MALEATE 10 MG
10 TABLET ORAL EVERY 6 HOURS PRN
Status: CANCELLED | OUTPATIENT
Start: 2023-12-20

## 2023-01-01 RX ORDER — METOPROLOL SUCCINATE 25 MG/1
12.5 TABLET, EXTENDED RELEASE ORAL DAILY
Status: DISCONTINUED | OUTPATIENT
Start: 2023-01-01 | End: 2023-01-01 | Stop reason: HOSPADM

## 2023-01-01 RX ORDER — SILDENAFIL CITRATE 20 MG/1
TABLET ORAL
Qty: 90 TABLET | Refills: 0 | Status: ON HOLD
Start: 2023-01-01 | End: 2023-01-01 | Stop reason: HOSPADM

## 2023-01-01 RX ORDER — LOSARTAN POTASSIUM 50 MG/1
1 TABLET ORAL DAILY
COMMUNITY
Start: 2022-10-27 | End: 2023-01-01 | Stop reason: WASHOUT

## 2023-01-01 RX ORDER — BENZONATATE 100 MG/1
100 CAPSULE ORAL 3 TIMES DAILY PRN
Status: DISCONTINUED | OUTPATIENT
Start: 2023-01-01 | End: 2023-01-01

## 2023-01-01 RX ORDER — LOSARTAN POTASSIUM 50 MG/1
50 TABLET ORAL DAILY
Status: DISCONTINUED | OUTPATIENT
Start: 2023-01-01 | End: 2023-01-01

## 2023-01-01 RX ORDER — ACETAMINOPHEN 650 MG/1
650 SUPPOSITORY RECTAL EVERY 4 HOURS PRN
Status: DISCONTINUED | OUTPATIENT
Start: 2023-01-01 | End: 2023-01-01 | Stop reason: HOSPADM

## 2023-01-01 RX ORDER — IPRATROPIUM BROMIDE AND ALBUTEROL SULFATE 2.5; .5 MG/3ML; MG/3ML
3 SOLUTION RESPIRATORY (INHALATION) EVERY 2 HOUR PRN
Status: DISCONTINUED | OUTPATIENT
Start: 2023-01-01 | End: 2023-01-01 | Stop reason: HOSPADM

## 2023-01-01 RX ORDER — BETAMETHASONE DIPROPIONATE 0.5 MG/G
CREAM TOPICAL
Status: ON HOLD | COMMUNITY
Start: 2019-12-17 | End: 2023-01-01 | Stop reason: ALTCHOICE

## 2023-01-01 RX ORDER — PANTOPRAZOLE SODIUM 40 MG/1
40 TABLET, DELAYED RELEASE ORAL
Status: DISCONTINUED | OUTPATIENT
Start: 2023-01-01 | End: 2023-01-01

## 2023-01-01 RX ORDER — ONDANSETRON HYDROCHLORIDE 2 MG/ML
4 INJECTION, SOLUTION INTRAVENOUS EVERY 8 HOURS PRN
Status: DISCONTINUED | OUTPATIENT
Start: 2023-01-01 | End: 2023-01-01 | Stop reason: HOSPADM

## 2023-01-01 RX ORDER — KETOROLAC TROMETHAMINE 15 MG/ML
15 INJECTION, SOLUTION INTRAMUSCULAR; INTRAVENOUS EVERY 6 HOURS
Status: DISCONTINUED | OUTPATIENT
Start: 2023-01-01 | End: 2023-01-01

## 2023-01-01 RX ORDER — SODIUM CHLORIDE 9 MG/ML
125 INJECTION, SOLUTION INTRAVENOUS CONTINUOUS
Status: DISCONTINUED | OUTPATIENT
Start: 2023-01-01 | End: 2023-01-01 | Stop reason: HOSPADM

## 2023-01-01 RX ORDER — HYDROCHLOROTHIAZIDE 25 MG/1
25 TABLET ORAL
Status: ON HOLD | COMMUNITY
Start: 2017-11-07 | End: 2023-01-01 | Stop reason: ALTCHOICE

## 2023-01-01 RX ORDER — SENNOSIDES 8.6 MG/1
1 TABLET ORAL 2 TIMES DAILY
Status: ON HOLD | COMMUNITY
End: 2023-01-01 | Stop reason: HOSPADM

## 2023-01-01 RX ORDER — DEXTROSE MONOHYDRATE 100 MG/ML
0.3 INJECTION, SOLUTION INTRAVENOUS ONCE AS NEEDED
Status: DISCONTINUED | OUTPATIENT
Start: 2023-01-01 | End: 2023-01-01

## 2023-01-01 RX ORDER — SILDENAFIL 25 MG/1
25 TABLET, FILM COATED ORAL EVERY 8 HOURS
Status: ON HOLD | COMMUNITY
Start: 2022-01-30 | End: 2023-01-01 | Stop reason: ALTCHOICE

## 2023-01-01 RX ORDER — GABAPENTIN 300 MG/1
300 CAPSULE ORAL 2 TIMES DAILY
Status: ON HOLD | COMMUNITY
Start: 2021-04-09 | End: 2023-01-01 | Stop reason: ALTCHOICE

## 2023-01-01 RX ORDER — TORSEMIDE 10 MG/1
10 TABLET ORAL DAILY
Status: ON HOLD | COMMUNITY
End: 2023-01-01 | Stop reason: SDUPTHER

## 2023-01-01 RX ORDER — LIDOCAINE 560 MG/1
1 PATCH PERCUTANEOUS; TOPICAL; TRANSDERMAL DAILY
Status: CANCELLED | OUTPATIENT
Start: 2023-01-01

## 2023-01-01 RX ORDER — LORATADINE 10 MG/1
10 TABLET ORAL
COMMUNITY
Start: 2021-04-10 | End: 2023-01-01 | Stop reason: WASHOUT

## 2023-01-01 RX ORDER — OXYCODONE AND ACETAMINOPHEN 10; 325 MG/1; MG/1
1 TABLET ORAL EVERY 4 HOURS PRN
Qty: 180 TABLET | Refills: 0 | Status: ON HOLD | OUTPATIENT
Start: 2023-01-01 | End: 2023-01-01 | Stop reason: HOSPADM

## 2023-01-01 RX ORDER — ALBUTEROL SULFATE 0.83 MG/ML
3 SOLUTION RESPIRATORY (INHALATION) AS NEEDED
Status: CANCELLED | OUTPATIENT
Start: 2023-12-06

## 2023-01-01 RX ORDER — AZITHROMYCIN 250 MG/1
250 TABLET, FILM COATED ORAL DAILY
Status: DISCONTINUED | OUTPATIENT
Start: 2023-01-01 | End: 2023-01-01

## 2023-01-01 RX ORDER — DULOXETINE 40 MG/1
1 CAPSULE, DELAYED RELEASE ORAL DAILY
Status: ON HOLD | COMMUNITY
Start: 2022-01-30 | End: 2023-01-01 | Stop reason: ALTCHOICE

## 2023-01-01 RX ORDER — NAPROXEN SODIUM 220 MG/1
81 TABLET, FILM COATED ORAL DAILY
Status: DISCONTINUED | OUTPATIENT
Start: 2023-01-01 | End: 2023-01-01 | Stop reason: HOSPADM

## 2023-01-01 RX ORDER — LOSARTAN POTASSIUM 50 MG/1
TABLET ORAL
Qty: 30 TABLET | Refills: 0
Start: 2023-01-01 | End: 2023-01-01 | Stop reason: WASHOUT

## 2023-01-01 RX ORDER — OXYCODONE HYDROCHLORIDE 5 MG/1
10 TABLET ORAL EVERY 4 HOURS PRN
Status: DISCONTINUED | OUTPATIENT
Start: 2023-01-01 | End: 2023-01-01

## 2023-01-01 RX ORDER — LIDOCAINE IN NACL,ISO-OSMOT/PF 30 MG/3 ML
10 SYRINGE (ML) INJECTION ONCE
Status: DISCONTINUED | OUTPATIENT
Start: 2023-01-01 | End: 2023-01-01 | Stop reason: ENTERED-IN-ERROR

## 2023-01-01 RX ORDER — ALBUTEROL SULFATE 90 UG/1
2 AEROSOL, METERED RESPIRATORY (INHALATION) EVERY 6 HOURS PRN
Status: CANCELLED | OUTPATIENT
Start: 2023-01-01

## 2023-01-01 RX ORDER — LORAZEPAM 2 MG/ML
0.5 INJECTION INTRAMUSCULAR EVERY 4 HOURS PRN
Status: DISCONTINUED | OUTPATIENT
Start: 2023-01-01 | End: 2023-01-01 | Stop reason: HOSPADM

## 2023-01-01 RX ORDER — FAMOTIDINE 10 MG/ML
20 INJECTION INTRAVENOUS ONCE AS NEEDED
Status: CANCELLED | OUTPATIENT
Start: 2023-12-20

## 2023-01-01 RX ORDER — FLUTICASONE PROPIONATE AND SALMETEROL 500; 50 UG/1; UG/1
1 POWDER RESPIRATORY (INHALATION)
Status: ON HOLD | COMMUNITY
End: 2023-01-01 | Stop reason: HOSPADM

## 2023-01-01 RX ORDER — DOCUSATE SODIUM 100 MG/1
200 CAPSULE, LIQUID FILLED ORAL 2 TIMES DAILY
Status: ON HOLD | COMMUNITY
Start: 2021-04-09 | End: 2023-01-01 | Stop reason: ALTCHOICE

## 2023-01-01 RX ORDER — PROCHLORPERAZINE EDISYLATE 5 MG/ML
10 INJECTION INTRAMUSCULAR; INTRAVENOUS EVERY 6 HOURS PRN
Status: CANCELLED | OUTPATIENT
Start: 2023-12-06

## 2023-01-01 RX ORDER — NALOXONE HYDROCHLORIDE 0.4 MG/ML
0.2 INJECTION, SOLUTION INTRAMUSCULAR; INTRAVENOUS; SUBCUTANEOUS AS NEEDED
Status: DISCONTINUED | OUTPATIENT
Start: 2023-01-01 | End: 2023-12-01 | Stop reason: HOSPADM

## 2023-01-01 RX ORDER — NAPROXEN SODIUM 220 MG/1
81 TABLET, FILM COATED ORAL DAILY
Status: CANCELLED | OUTPATIENT
Start: 2023-01-01

## 2023-01-01 RX ORDER — GADOTERATE MEGLUMINE 376.9 MG/ML
9 INJECTION INTRAVENOUS
Status: COMPLETED | OUTPATIENT
Start: 2023-01-01 | End: 2023-01-01

## 2023-01-01 RX ORDER — ALBUTEROL SULFATE 0.83 MG/ML
3 SOLUTION RESPIRATORY (INHALATION) AS NEEDED
Status: CANCELLED | OUTPATIENT
Start: 2023-12-20

## 2023-01-01 RX ORDER — HYDROMORPHONE HYDROCHLORIDE 1 MG/ML
0.5 INJECTION, SOLUTION INTRAMUSCULAR; INTRAVENOUS; SUBCUTANEOUS EVERY 6 HOURS PRN
Status: DISCONTINUED | OUTPATIENT
Start: 2023-01-01 | End: 2023-01-01

## 2023-01-01 RX ORDER — PREDNISONE 20 MG/1
40 TABLET ORAL ONCE
Status: COMPLETED | OUTPATIENT
Start: 2023-01-01 | End: 2023-01-01

## 2023-01-01 RX ORDER — DEXTROMETHORPHAN POLISTIREX 30 MG/5 ML
1 SUSPENSION, EXTENDED RELEASE 12 HR ORAL
Status: ON HOLD | COMMUNITY
End: 2023-01-01 | Stop reason: HOSPADM

## 2023-01-01 RX ORDER — ENOXAPARIN SODIUM 100 MG/ML
40 INJECTION SUBCUTANEOUS DAILY
Status: DISCONTINUED | OUTPATIENT
Start: 2023-01-01 | End: 2023-01-01 | Stop reason: HOSPADM

## 2023-01-01 RX ORDER — POTASSIUM CHLORIDE 20 MEQ/1
40 TABLET, EXTENDED RELEASE ORAL ONCE
Status: COMPLETED | OUTPATIENT
Start: 2023-01-01 | End: 2023-01-01

## 2023-01-01 RX ORDER — FUROSEMIDE 10 MG/ML
40 INJECTION INTRAMUSCULAR; INTRAVENOUS EVERY 12 HOURS
Status: DISCONTINUED | OUTPATIENT
Start: 2023-01-01 | End: 2023-01-01

## 2023-01-01 RX ORDER — POLYETHYLENE GLYCOL 3350 17 G/17G
17 POWDER, FOR SOLUTION ORAL 2 TIMES DAILY
Status: DISCONTINUED | OUTPATIENT
Start: 2023-01-01 | End: 2023-01-01 | Stop reason: HOSPADM

## 2023-01-01 RX ORDER — DULOXETIN HYDROCHLORIDE 30 MG/1
30 CAPSULE, DELAYED RELEASE ORAL DAILY
Status: DISCONTINUED | OUTPATIENT
Start: 2023-01-01 | End: 2023-01-01 | Stop reason: HOSPADM

## 2023-01-01 RX ORDER — AMOXICILLIN 250 MG
2 CAPSULE ORAL 2 TIMES DAILY
Status: DISCONTINUED | OUTPATIENT
Start: 2023-01-01 | End: 2023-01-01

## 2023-01-01 RX ORDER — FLUTICASONE PROPIONATE 50 MCG
1 SPRAY, SUSPENSION (ML) NASAL DAILY
Status: DISCONTINUED | OUTPATIENT
Start: 2023-01-01 | End: 2023-01-01

## 2023-01-01 RX ORDER — ATORVASTATIN CALCIUM 40 MG/1
40 TABLET, FILM COATED ORAL NIGHTLY
Status: ON HOLD | COMMUNITY
Start: 2022-01-30 | End: 2023-01-01 | Stop reason: HOSPADM

## 2023-01-01 RX ORDER — ACETAMINOPHEN 325 MG/1
650 TABLET ORAL 3 TIMES DAILY
Status: DISCONTINUED | OUTPATIENT
Start: 2023-01-01 | End: 2023-01-01 | Stop reason: HOSPADM

## 2023-01-01 RX ORDER — DICLOFENAC SODIUM 10 MG/G
1 GEL TOPICAL 2 TIMES DAILY PRN
Status: ON HOLD | COMMUNITY
End: 2023-01-01 | Stop reason: HOSPADM

## 2023-01-01 RX ORDER — OXYCODONE AND ACETAMINOPHEN 5; 325 MG/1; MG/1
1 TABLET ORAL ONCE
Status: COMPLETED | OUTPATIENT
Start: 2023-01-01 | End: 2023-01-01

## 2023-01-01 RX ORDER — L. ACIDOPHILUS/PECTIN, CITRUS 25MM-100MG
1 TABLET ORAL 2 TIMES DAILY
Status: ON HOLD | COMMUNITY
End: 2023-01-01 | Stop reason: HOSPADM

## 2023-01-01 RX ORDER — ALBUTEROL SULFATE 0.83 MG/ML
2.5 SOLUTION RESPIRATORY (INHALATION) EVERY 6 HOURS PRN
Status: DISCONTINUED | OUTPATIENT
Start: 2023-01-01 | End: 2023-01-01 | Stop reason: HOSPADM

## 2023-01-01 RX ORDER — MIDAZOLAM HYDROCHLORIDE 5 MG/ML
1 INJECTION, SOLUTION INTRAMUSCULAR; INTRAVENOUS ONCE
Status: DISCONTINUED | OUTPATIENT
Start: 2023-01-01 | End: 2023-01-01

## 2023-01-01 RX ORDER — LORAZEPAM 2 MG/ML
0.5 INJECTION INTRAMUSCULAR EVERY 4 HOURS PRN
Status: DISCONTINUED | OUTPATIENT
Start: 2023-01-01 | End: 2023-12-01 | Stop reason: HOSPADM

## 2023-01-01 RX ORDER — GUAIFENESIN 100 MG/5ML
20 SOLUTION ORAL EVERY 6 HOURS PRN
Status: ON HOLD | COMMUNITY
End: 2023-01-01 | Stop reason: ALTCHOICE

## 2023-01-01 RX ORDER — CHOLECALCIFEROL (VITAMIN D3) 25 MCG
50 TABLET ORAL 2 TIMES DAILY
Status: DISCONTINUED | OUTPATIENT
Start: 2023-01-01 | End: 2023-01-01 | Stop reason: HOSPADM

## 2023-01-01 RX ORDER — DEXTROMETHORPHAN POLISTIREX 30 MG/5 ML
1 SUSPENSION, EXTENDED RELEASE 12 HR ORAL DAILY PRN
COMMUNITY
End: 2023-01-01 | Stop reason: WASHOUT

## 2023-01-01 RX ORDER — OLOPATADINE HYDROCHLORIDE 1 MG/ML
1 SOLUTION/ DROPS OPHTHALMIC 3 TIMES DAILY
Status: ON HOLD | COMMUNITY
End: 2023-01-01 | Stop reason: HOSPADM

## 2023-01-01 RX ORDER — GLYCOPYRROLATE 0.2 MG/ML
0.2 INJECTION INTRAMUSCULAR; INTRAVENOUS EVERY 4 HOURS PRN
Status: DISCONTINUED | OUTPATIENT
Start: 2023-01-01 | End: 2023-12-01 | Stop reason: HOSPADM

## 2023-01-01 RX ORDER — ENOXAPARIN SODIUM 100 MG/ML
40 INJECTION SUBCUTANEOUS EVERY 24 HOURS
Status: DISCONTINUED | OUTPATIENT
Start: 2023-01-01 | End: 2023-01-01 | Stop reason: SDUPTHER

## 2023-01-01 RX ORDER — DIPHENHYDRAMINE HYDROCHLORIDE 50 MG/ML
50 INJECTION INTRAMUSCULAR; INTRAVENOUS AS NEEDED
Status: CANCELLED | OUTPATIENT
Start: 2023-12-06

## 2023-01-01 RX ORDER — HEPARIN SODIUM 5000 [USP'U]/ML
5000 INJECTION, SOLUTION INTRAVENOUS; SUBCUTANEOUS EVERY 8 HOURS SCHEDULED
Status: DISCONTINUED | OUTPATIENT
Start: 2023-01-01 | End: 2023-01-01

## 2023-01-01 RX ORDER — ACETAMINOPHEN 325 MG/1
2 TABLET ORAL 3 TIMES DAILY
COMMUNITY
End: 2023-01-01 | Stop reason: WASHOUT

## 2023-01-01 RX ORDER — IPRATROPIUM BROMIDE AND ALBUTEROL SULFATE 2.5; .5 MG/3ML; MG/3ML
3 SOLUTION RESPIRATORY (INHALATION) EVERY 20 MIN
Status: COMPLETED | OUTPATIENT
Start: 2023-01-01 | End: 2023-01-01

## 2023-01-01 RX ORDER — MORPHINE SULFATE IN 0.9 % NACL 30 MG/30ML
PATIENT CONTROLLED ANALGESIA SYRINGE INTRAVENOUS CONTINUOUS
Status: DISCONTINUED | OUTPATIENT
Start: 2023-01-01 | End: 2023-12-01 | Stop reason: HOSPADM

## 2023-01-01 RX ORDER — LEVOFLOXACIN 5 MG/ML
750 INJECTION, SOLUTION INTRAVENOUS
Status: DISCONTINUED | OUTPATIENT
Start: 2023-01-01 | End: 2023-01-01 | Stop reason: HOSPADM

## 2023-01-01 RX ORDER — ADHESIVE BANDAGE
30 BANDAGE TOPICAL DAILY PRN
Status: CANCELLED | OUTPATIENT
Start: 2023-01-01

## 2023-01-01 RX ORDER — FORMOTEROL FUMARATE DIHYDRATE 20 UG/2ML
20 SOLUTION RESPIRATORY (INHALATION)
Status: DISCONTINUED | OUTPATIENT
Start: 2023-01-01 | End: 2023-01-01 | Stop reason: HOSPADM

## 2023-01-01 RX ORDER — PANTOPRAZOLE SODIUM 40 MG/1
40 TABLET, DELAYED RELEASE ORAL
Status: DISCONTINUED | OUTPATIENT
Start: 2023-01-01 | End: 2023-01-01 | Stop reason: HOSPADM

## 2023-01-01 RX ORDER — MULTIVITAMIN WITH MINERALS
1 TABLET ORAL DAILY
Status: ON HOLD | COMMUNITY
End: 2023-01-01 | Stop reason: HOSPADM

## 2023-01-01 RX ORDER — GABAPENTIN 100 MG/1
100 CAPSULE ORAL 2 TIMES DAILY
Status: ON HOLD | COMMUNITY
End: 2023-01-01 | Stop reason: ALTCHOICE

## 2023-01-01 RX ORDER — ADHESIVE BANDAGE
30 BANDAGE TOPICAL DAILY PRN
Status: ON HOLD | COMMUNITY
End: 2023-01-01 | Stop reason: HOSPADM

## 2023-01-01 RX ORDER — HYDROMORPHONE HYDROCHLORIDE 1 MG/ML
0.2 INJECTION, SOLUTION INTRAMUSCULAR; INTRAVENOUS; SUBCUTANEOUS
Status: DISCONTINUED | OUTPATIENT
Start: 2023-01-01 | End: 2023-01-01

## 2023-01-01 RX ORDER — GUAIFENESIN 600 MG/1
600 TABLET, EXTENDED RELEASE ORAL 2 TIMES DAILY
Qty: 14 TABLET | Refills: 0 | Status: SHIPPED | OUTPATIENT
Start: 2023-01-01 | End: 2023-01-01

## 2023-01-01 RX ORDER — FUROSEMIDE 10 MG/ML
40 INJECTION INTRAMUSCULAR; INTRAVENOUS 2 TIMES DAILY
Status: DISCONTINUED | OUTPATIENT
Start: 2023-01-01 | End: 2023-01-01

## 2023-01-01 RX ORDER — PANTOPRAZOLE SODIUM 40 MG/1
40 TABLET, DELAYED RELEASE ORAL
Status: ON HOLD | COMMUNITY
End: 2023-01-01 | Stop reason: HOSPADM

## 2023-01-01 RX ORDER — DILTIAZEM HYDROCHLORIDE 360 MG/1
360 CAPSULE, EXTENDED RELEASE ORAL
Status: ON HOLD | COMMUNITY
Start: 2017-11-13 | End: 2023-01-01 | Stop reason: ALTCHOICE

## 2023-01-01 RX ORDER — NICOTINE POLACRILEX 2 MG/1
2 LOZENGE ORAL EVERY 2 HOUR PRN
Status: ON HOLD | COMMUNITY
End: 2023-01-01 | Stop reason: ALTCHOICE

## 2023-01-01 RX ORDER — GUAIFENESIN 600 MG/1
1200 TABLET, EXTENDED RELEASE ORAL 2 TIMES DAILY
Status: ON HOLD | COMMUNITY
End: 2023-01-01

## 2023-01-01 RX ORDER — GLYCOPYRROLATE 0.2 MG/ML
0.2 INJECTION INTRAMUSCULAR; INTRAVENOUS EVERY 4 HOURS PRN
Status: DISCONTINUED | OUTPATIENT
Start: 2023-01-01 | End: 2023-01-01 | Stop reason: HOSPADM

## 2023-01-01 RX ORDER — OXYCODONE HYDROCHLORIDE 5 MG/1
5 TABLET ORAL EVERY 4 HOURS PRN
Status: DISCONTINUED | OUTPATIENT
Start: 2023-01-01 | End: 2023-01-01

## 2023-01-01 RX ORDER — GUAIFENESIN 600 MG/1
600 TABLET, EXTENDED RELEASE ORAL 2 TIMES DAILY
Status: DISCONTINUED | OUTPATIENT
Start: 2023-01-01 | End: 2023-01-01 | Stop reason: HOSPADM

## 2023-01-01 RX ORDER — FLUTICASONE FUROATE AND VILANTEROL 200; 25 UG/1; UG/1
1 POWDER RESPIRATORY (INHALATION)
Status: ON HOLD | COMMUNITY
Start: 2021-02-04 | End: 2023-01-01 | Stop reason: ALTCHOICE

## 2023-01-01 RX ORDER — LOSARTAN POTASSIUM 50 MG/1
50 TABLET ORAL DAILY
Status: ON HOLD | COMMUNITY
End: 2023-01-01 | Stop reason: SDUPTHER

## 2023-01-01 RX ORDER — TIOTROPIUM BROMIDE INHALATION SPRAY 3.12 UG/1
2 SPRAY, METERED RESPIRATORY (INHALATION) DAILY
Status: ON HOLD | COMMUNITY
End: 2023-01-01 | Stop reason: HOSPADM

## 2023-01-01 RX ORDER — ATORVASTATIN CALCIUM 80 MG/1
80 TABLET, FILM COATED ORAL
Status: ON HOLD | COMMUNITY
Start: 2017-12-18 | End: 2023-01-01 | Stop reason: ALTCHOICE

## 2023-01-01 RX ORDER — FLUDEOXYGLUCOSE F 18 200 MCI/ML
10.3 INJECTION, SOLUTION INTRAVENOUS
Status: COMPLETED | OUTPATIENT
Start: 2023-01-01 | End: 2023-01-01

## 2023-01-01 RX ORDER — ONDANSETRON 4 MG/1
4 TABLET, FILM COATED ORAL EVERY 8 HOURS PRN
Status: ON HOLD | COMMUNITY
End: 2023-01-01 | Stop reason: HOSPADM

## 2023-01-01 RX ORDER — ONDANSETRON HYDROCHLORIDE 2 MG/ML
4 INJECTION, SOLUTION INTRAVENOUS EVERY 4 HOURS PRN
Status: DISCONTINUED | OUTPATIENT
Start: 2023-01-01 | End: 2023-01-01

## 2023-01-01 RX ORDER — LIDOCAINE 560 MG/1
1 PATCH PERCUTANEOUS; TOPICAL; TRANSDERMAL DAILY
COMMUNITY
End: 2023-12-01 | Stop reason: HOSPADM

## 2023-01-01 RX ORDER — LEVOFLOXACIN 750 MG/1
750 TABLET ORAL EVERY OTHER DAY
Qty: 4 TABLET | Refills: 0 | Status: SHIPPED | OUTPATIENT
Start: 2023-01-01 | End: 2023-01-01

## 2023-01-01 RX ORDER — ACETAMINOPHEN 500 MG
5 TABLET ORAL NIGHTLY
Status: CANCELLED | OUTPATIENT
Start: 2023-01-01

## 2023-01-01 RX ORDER — POLYETHYLENE GLYCOL 3350 17 G/17G
17 POWDER, FOR SOLUTION ORAL DAILY
Status: DISCONTINUED | OUTPATIENT
Start: 2023-01-01 | End: 2023-01-01

## 2023-01-01 RX ORDER — SILDENAFIL 25 MG/1
25 TABLET, FILM COATED ORAL 3 TIMES DAILY
COMMUNITY
End: 2023-01-01 | Stop reason: HOSPADM

## 2023-01-01 RX ORDER — CLONAZEPAM 0.5 MG/1
0.5 TABLET ORAL NIGHTLY PRN
Status: ON HOLD | COMMUNITY
Start: 2018-01-02 | End: 2023-01-01 | Stop reason: ALTCHOICE

## 2023-01-01 RX ORDER — ACETAMINOPHEN 325 MG/1
650 TABLET ORAL EVERY 4 HOURS PRN
Status: ON HOLD | COMMUNITY
Start: 2022-09-04 | End: 2023-01-01 | Stop reason: HOSPADM

## 2023-01-01 RX ORDER — ENOXAPARIN SODIUM 100 MG/ML
40 INJECTION SUBCUTANEOUS
Status: DISCONTINUED | OUTPATIENT
Start: 2023-01-01 | End: 2023-01-01 | Stop reason: HOSPADM

## 2023-01-01 RX ORDER — DIPHENHYDRAMINE HYDROCHLORIDE 50 MG/ML
50 INJECTION INTRAMUSCULAR; INTRAVENOUS AS NEEDED
Status: CANCELLED | OUTPATIENT
Start: 2023-12-20

## 2023-01-01 RX ORDER — BENZONATATE 100 MG/1
100 CAPSULE ORAL 3 TIMES DAILY PRN
Status: ON HOLD | COMMUNITY
End: 2023-01-01 | Stop reason: HOSPADM

## 2023-01-01 RX ORDER — ALENDRONATE SODIUM 70 MG/1
70 TABLET ORAL
Status: DISCONTINUED | OUTPATIENT
Start: 2023-01-01 | End: 2023-01-01 | Stop reason: CLARIF

## 2023-01-01 RX ORDER — ACETAMINOPHEN 325 MG/1
650 TABLET ORAL 3 TIMES DAILY
Status: ON HOLD | COMMUNITY
End: 2023-01-01 | Stop reason: HOSPADM

## 2023-01-01 RX ORDER — IPRATROPIUM BROMIDE AND ALBUTEROL SULFATE 2.5; .5 MG/3ML; MG/3ML
3 SOLUTION RESPIRATORY (INHALATION)
Status: DISCONTINUED | OUTPATIENT
Start: 2023-01-01 | End: 2023-01-01

## 2023-01-01 RX ORDER — SILDENAFIL CITRATE 20 MG/1
TABLET ORAL
Qty: 90 TABLET | Refills: 0
Start: 2023-01-01 | End: 2023-01-01 | Stop reason: SDUPTHER

## 2023-01-01 RX ORDER — L. ACIDOPHILUS/L.BULGARICUS 1MM CELL
1 TABLET ORAL 2 TIMES DAILY
COMMUNITY
Start: 2022-06-13 | End: 2023-01-01 | Stop reason: WASHOUT

## 2023-01-01 RX ORDER — IPRATROPIUM BROMIDE AND ALBUTEROL SULFATE 2.5; .5 MG/3ML; MG/3ML
3 SOLUTION RESPIRATORY (INHALATION) EVERY 6 HOURS PRN
Status: ON HOLD | COMMUNITY
End: 2023-01-01 | Stop reason: HOSPADM

## 2023-01-01 RX ORDER — POTASSIUM CHLORIDE 1.5 G/1.58G
20 POWDER, FOR SOLUTION ORAL ONCE
Status: COMPLETED | OUTPATIENT
Start: 2023-01-01 | End: 2023-01-01

## 2023-01-01 RX ORDER — MORPHINE SULFATE 2 MG/ML
2 INJECTION, SOLUTION INTRAMUSCULAR; INTRAVENOUS
Status: DISCONTINUED | OUTPATIENT
Start: 2023-01-01 | End: 2023-01-01

## 2023-01-01 RX ORDER — SILDENAFIL 25 MG/1
25 TABLET, FILM COATED ORAL 3 TIMES DAILY
Status: DISCONTINUED | OUTPATIENT
Start: 2023-01-01 | End: 2023-01-01 | Stop reason: HOSPADM

## 2023-01-01 RX ORDER — MORPHINE SULFATE 2 MG/ML
2 INJECTION, SOLUTION INTRAMUSCULAR; INTRAVENOUS
Status: DISCONTINUED | OUTPATIENT
Start: 2023-01-01 | End: 2023-12-01 | Stop reason: HOSPADM

## 2023-01-01 RX ORDER — CLONAZEPAM 0.5 MG/1
0.5 TABLET ORAL NIGHTLY PRN
Status: DISCONTINUED | OUTPATIENT
Start: 2023-01-01 | End: 2023-01-01

## 2023-01-01 RX ORDER — NAPROXEN SODIUM 220 MG/1
81 TABLET, FILM COATED ORAL DAILY
Status: DISCONTINUED | OUTPATIENT
Start: 2023-01-01 | End: 2023-01-01

## 2023-01-01 RX ORDER — LIDOCAINE HYDROCHLORIDE 10 MG/ML
10 INJECTION, SOLUTION EPIDURAL; INFILTRATION; INTRACAUDAL; PERINEURAL ONCE
Status: DISCONTINUED | OUTPATIENT
Start: 2023-01-01 | End: 2023-01-01

## 2023-01-01 RX ORDER — LOSARTAN POTASSIUM 50 MG/1
50 TABLET ORAL DAILY
Status: ON HOLD | COMMUNITY
End: 2023-01-01 | Stop reason: HOSPADM

## 2023-01-01 RX ORDER — EPINEPHRINE 0.3 MG/.3ML
0.3 INJECTION SUBCUTANEOUS EVERY 5 MIN PRN
Status: CANCELLED | OUTPATIENT
Start: 2023-12-06

## 2023-01-01 RX ORDER — EPINEPHRINE 0.3 MG/.3ML
0.3 INJECTION SUBCUTANEOUS EVERY 5 MIN PRN
Status: CANCELLED | OUTPATIENT
Start: 2023-12-20

## 2023-01-01 RX ORDER — OXYCODONE AND ACETAMINOPHEN 5; 325 MG/1; MG/1
TABLET ORAL EVERY 4 HOURS PRN
COMMUNITY
End: 2023-01-01 | Stop reason: HOSPADM

## 2023-01-01 RX ORDER — KETOTIFEN FUMARATE 0.35 MG/ML
1 SOLUTION/ DROPS OPHTHALMIC 2 TIMES DAILY
Status: DISCONTINUED | OUTPATIENT
Start: 2023-01-01 | End: 2023-01-01 | Stop reason: HOSPADM

## 2023-01-01 RX ORDER — AZITHROMYCIN 500 MG/1
500 TABLET, FILM COATED ORAL DAILY
Status: COMPLETED | OUTPATIENT
Start: 2023-01-01 | End: 2023-01-01

## 2023-01-01 RX ORDER — OXYCODONE AND ACETAMINOPHEN 5; 325 MG/1; MG/1
1 TABLET ORAL EVERY 4 HOURS PRN
Status: DISCONTINUED | OUTPATIENT
Start: 2023-01-01 | End: 2023-01-01 | Stop reason: HOSPADM

## 2023-01-01 RX ORDER — OXYCODONE HYDROCHLORIDE 5 MG/1
5 TABLET ORAL EVERY 4 HOURS PRN
Status: DISCONTINUED | OUTPATIENT
Start: 2023-01-01 | End: 2023-01-01 | Stop reason: HOSPADM

## 2023-01-01 RX ORDER — SILDENAFIL CITRATE 20 MG/1
20 TABLET ORAL 3 TIMES DAILY
Status: ON HOLD | COMMUNITY
Start: 2021-04-09 | End: 2023-01-01 | Stop reason: ALTCHOICE

## 2023-01-01 RX ORDER — TALC
5 POWDER (GRAM) TOPICAL NIGHTLY
Status: DISCONTINUED | OUTPATIENT
Start: 2023-01-01 | End: 2023-01-01 | Stop reason: HOSPADM

## 2023-01-01 RX ORDER — ONDANSETRON 4 MG/1
8 TABLET, FILM COATED ORAL EVERY 8 HOURS PRN
Status: CANCELLED | OUTPATIENT
Start: 2023-01-01

## 2023-01-01 RX ORDER — ACETAMINOPHEN 650 MG/1
650 SUPPOSITORY RECTAL EVERY 4 HOURS PRN
Status: DISCONTINUED | OUTPATIENT
Start: 2023-01-01 | End: 2023-12-01 | Stop reason: HOSPADM

## 2023-01-01 RX ORDER — BUDESONIDE 0.5 MG/2ML
0.5 INHALANT ORAL
Status: DISCONTINUED | OUTPATIENT
Start: 2023-01-01 | End: 2023-01-01 | Stop reason: HOSPADM

## 2023-01-01 RX ORDER — BISACODYL 10 MG/1
10 SUPPOSITORY RECTAL ONCE AS NEEDED
Status: DISCONTINUED | OUTPATIENT
Start: 2023-01-01 | End: 2023-01-01 | Stop reason: HOSPADM

## 2023-01-01 RX ORDER — HEPARIN SODIUM 5000 [USP'U]/ML
60 INJECTION, SOLUTION INTRAVENOUS; SUBCUTANEOUS ONCE
Status: COMPLETED | OUTPATIENT
Start: 2023-01-01 | End: 2023-01-01

## 2023-01-01 RX ORDER — HEPARIN SODIUM 10000 [USP'U]/100ML
0-4000 INJECTION, SOLUTION INTRAVENOUS CONTINUOUS
Status: DISCONTINUED | OUTPATIENT
Start: 2023-01-01 | End: 2023-01-01

## 2023-01-01 RX ORDER — PREDNISONE 20 MG/1
40 TABLET ORAL DAILY
Status: DISCONTINUED | OUTPATIENT
Start: 2023-01-01 | End: 2023-01-01 | Stop reason: HOSPADM

## 2023-01-01 RX ORDER — FERROUS SULFATE 325(65) MG
65 TABLET ORAL DAILY
Status: ON HOLD | COMMUNITY
End: 2023-01-01 | Stop reason: ALTCHOICE

## 2023-01-01 RX ORDER — ALENDRONATE SODIUM 70 MG/1
70 TABLET ORAL
Status: ON HOLD | COMMUNITY
Start: 2022-06-13 | End: 2023-01-01 | Stop reason: ALTCHOICE

## 2023-01-01 RX ORDER — HEPARIN SODIUM 5000 [USP'U]/ML
INJECTION, SOLUTION INTRAVENOUS; SUBCUTANEOUS EVERY 4 HOURS PRN
Status: DISCONTINUED | OUTPATIENT
Start: 2023-01-01 | End: 2023-01-01

## 2023-01-01 RX ORDER — AMOXICILLIN 500 MG/1
CAPSULE ORAL
COMMUNITY
Start: 2021-11-10 | End: 2023-01-01 | Stop reason: WASHOUT

## 2023-01-01 RX ORDER — CEFTRIAXONE 1 G/50ML
1 INJECTION, SOLUTION INTRAVENOUS EVERY 24 HOURS
Status: DISCONTINUED | OUTPATIENT
Start: 2023-01-01 | End: 2023-01-01

## 2023-01-01 RX ORDER — DULOXETIN HYDROCHLORIDE 30 MG/1
30 CAPSULE, DELAYED RELEASE ORAL DAILY
Status: ON HOLD | COMMUNITY
End: 2023-01-01 | Stop reason: HOSPADM

## 2023-01-01 RX ORDER — FAMOTIDINE 10 MG/ML
20 INJECTION INTRAVENOUS ONCE AS NEEDED
Status: CANCELLED | OUTPATIENT
Start: 2023-12-06

## 2023-01-01 RX ORDER — SPIRONOLACTONE 25 MG/1
25 TABLET ORAL 2 TIMES DAILY
Status: ON HOLD | COMMUNITY
End: 2023-01-01 | Stop reason: ALTCHOICE

## 2023-01-01 RX ORDER — METOPROLOL SUCCINATE 25 MG/1
12.5 TABLET, EXTENDED RELEASE ORAL DAILY
Status: ON HOLD | COMMUNITY
End: 2023-01-01 | Stop reason: HOSPADM

## 2023-01-01 RX ORDER — HYDROMORPHONE HYDROCHLORIDE 1 MG/ML
0.5 INJECTION, SOLUTION INTRAMUSCULAR; INTRAVENOUS; SUBCUTANEOUS ONCE
Status: COMPLETED | OUTPATIENT
Start: 2023-01-01 | End: 2023-01-01

## 2023-01-01 RX ORDER — FERROUS SULFATE 325(65) MG
65 TABLET ORAL DAILY
Status: DISCONTINUED | OUTPATIENT
Start: 2023-01-01 | End: 2023-01-01 | Stop reason: HOSPADM

## 2023-01-01 RX ORDER — TALC
3 POWDER (GRAM) TOPICAL NIGHTLY PRN
COMMUNITY
Start: 2021-04-09 | End: 2023-01-01 | Stop reason: WASHOUT

## 2023-01-01 RX ORDER — VANCOMYCIN HYDROCHLORIDE 1 G/200ML
1000 INJECTION, SOLUTION INTRAVENOUS EVERY 24 HOURS
Status: DISCONTINUED | OUTPATIENT
Start: 2023-01-01 | End: 2023-01-01

## 2023-01-01 RX ORDER — DULOXETIN HYDROCHLORIDE 30 MG/1
30 CAPSULE, DELAYED RELEASE ORAL DAILY
Status: DISCONTINUED | OUTPATIENT
Start: 2023-01-01 | End: 2023-01-01

## 2023-01-01 RX ORDER — LIDOCAINE 560 MG/1
1 PATCH PERCUTANEOUS; TOPICAL; TRANSDERMAL DAILY
Status: DISCONTINUED | OUTPATIENT
Start: 2023-01-01 | End: 2023-12-01 | Stop reason: HOSPADM

## 2023-01-01 RX ORDER — METOPROLOL SUCCINATE 25 MG/1
25 TABLET, EXTENDED RELEASE ORAL DAILY
Status: DISCONTINUED | OUTPATIENT
Start: 2023-01-01 | End: 2023-01-01

## 2023-01-01 RX ORDER — FERROUS SULFATE 325(65) MG
65 TABLET ORAL DAILY
Status: DISCONTINUED | OUTPATIENT
Start: 2023-01-01 | End: 2023-01-01

## 2023-01-01 RX ORDER — ACETAMINOPHEN 650 MG/1
1 SUPPOSITORY RECTAL EVERY 4 HOURS PRN
Status: ON HOLD | COMMUNITY
End: 2023-01-01 | Stop reason: HOSPADM

## 2023-01-01 RX ORDER — FENTANYL CITRATE 50 UG/ML
50 INJECTION, SOLUTION INTRAMUSCULAR; INTRAVENOUS ONCE
Status: DISCONTINUED | OUTPATIENT
Start: 2023-01-01 | End: 2023-01-01

## 2023-01-01 RX ORDER — GABAPENTIN 100 MG/1
200 CAPSULE ORAL 3 TIMES DAILY
Status: DISCONTINUED | OUTPATIENT
Start: 2023-01-01 | End: 2023-01-01

## 2023-01-01 RX ORDER — IPRATROPIUM BROMIDE AND ALBUTEROL SULFATE 2.5; .5 MG/3ML; MG/3ML
3 SOLUTION RESPIRATORY (INHALATION)
Status: DISCONTINUED | OUTPATIENT
Start: 2023-01-01 | End: 2023-01-01 | Stop reason: HOSPADM

## 2023-01-01 RX ORDER — MORPHINE SULFATE 2 MG/ML
2 INJECTION, SOLUTION INTRAMUSCULAR; INTRAVENOUS EVERY 30 MIN PRN
Status: DISCONTINUED | OUTPATIENT
Start: 2023-01-01 | End: 2023-01-01 | Stop reason: HOSPADM

## 2023-01-01 RX ORDER — BISACODYL 10 MG/1
10 SUPPOSITORY RECTAL DAILY PRN
Status: DISCONTINUED | OUTPATIENT
Start: 2023-01-01 | End: 2023-12-01 | Stop reason: HOSPADM

## 2023-01-01 RX ORDER — SENNOSIDES 8.6 MG/1
1 TABLET ORAL 2 TIMES DAILY
Status: DISCONTINUED | OUTPATIENT
Start: 2023-01-01 | End: 2023-01-01 | Stop reason: HOSPADM

## 2023-01-01 RX ORDER — ONDANSETRON 4 MG/1
4 TABLET, FILM COATED ORAL EVERY 6 HOURS PRN
Status: DISCONTINUED | OUTPATIENT
Start: 2023-01-01 | End: 2023-01-01

## 2023-01-01 RX ORDER — ONDANSETRON 4 MG/1
4 TABLET, FILM COATED ORAL EVERY 8 HOURS PRN
Status: DISCONTINUED | OUTPATIENT
Start: 2023-01-01 | End: 2023-01-01

## 2023-01-01 RX ORDER — ONDANSETRON HYDROCHLORIDE 2 MG/ML
4 INJECTION, SOLUTION INTRAVENOUS EVERY 4 HOURS PRN
Status: DISCONTINUED | OUTPATIENT
Start: 2023-01-01 | End: 2023-12-01 | Stop reason: HOSPADM

## 2023-01-01 RX ORDER — METOCLOPRAMIDE 10 MG/1
5 TABLET ORAL 2 TIMES DAILY PRN
Status: CANCELLED | OUTPATIENT
Start: 2023-01-01

## 2023-01-01 RX ORDER — DOCUSATE SODIUM 100 MG/1
200 CAPSULE, LIQUID FILLED ORAL DAILY
Status: DISCONTINUED | OUTPATIENT
Start: 2023-01-01 | End: 2023-01-01

## 2023-01-01 RX ORDER — TORSEMIDE 20 MG/1
10 TABLET ORAL DAILY
Status: DISCONTINUED | OUTPATIENT
Start: 2023-01-01 | End: 2023-01-01 | Stop reason: HOSPADM

## 2023-01-01 RX ORDER — PANTOPRAZOLE SODIUM 40 MG/1
40 TABLET, DELAYED RELEASE ORAL
Status: CANCELLED | OUTPATIENT
Start: 2023-01-01

## 2023-01-01 RX ORDER — LIDOCAINE 560 MG/1
1 PATCH PERCUTANEOUS; TOPICAL; TRANSDERMAL ONCE
Status: COMPLETED | OUTPATIENT
Start: 2023-01-01 | End: 2023-01-01

## 2023-01-01 RX ORDER — GUAIFENESIN 100 MG/5ML
200 SOLUTION ORAL EVERY 4 HOURS PRN
Status: DISCONTINUED | OUTPATIENT
Start: 2023-01-01 | End: 2023-01-01 | Stop reason: HOSPADM

## 2023-01-01 RX ORDER — ACETAMINOPHEN 500 MG
1 TABLET ORAL NIGHTLY
Status: ON HOLD | COMMUNITY
End: 2023-01-01 | Stop reason: HOSPADM

## 2023-01-01 RX ORDER — ERGOCALCIFEROL 1.25 MG/1
50000 CAPSULE ORAL
Status: ON HOLD | COMMUNITY
Start: 2017-11-16 | End: 2023-01-01 | Stop reason: ALTCHOICE

## 2023-01-01 RX ORDER — ATORVASTATIN CALCIUM 40 MG/1
40 TABLET, FILM COATED ORAL NIGHTLY
Status: DISCONTINUED | OUTPATIENT
Start: 2023-01-01 | End: 2023-01-01

## 2023-01-01 RX ORDER — ENOXAPARIN SODIUM 100 MG/ML
30 INJECTION SUBCUTANEOUS EVERY 24 HOURS
Status: DISCONTINUED | OUTPATIENT
Start: 2023-01-01 | End: 2023-01-01

## 2023-01-01 RX ORDER — VANCOMYCIN HYDROCHLORIDE 1 G/200ML
1000 INJECTION, SOLUTION INTRAVENOUS ONCE
Status: COMPLETED | OUTPATIENT
Start: 2023-01-01 | End: 2023-01-01

## 2023-01-01 RX ORDER — LOSARTAN POTASSIUM 100 MG/1
100 TABLET ORAL
COMMUNITY
Start: 2020-05-21 | End: 2023-01-01 | Stop reason: WASHOUT

## 2023-01-01 RX ORDER — SILDENAFIL CITRATE 20 MG/1
20 TABLET ORAL 3 TIMES DAILY
Status: DISCONTINUED | OUTPATIENT
Start: 2023-01-01 | End: 2023-01-01 | Stop reason: HOSPADM

## 2023-01-01 RX ORDER — MORPHINE SULFATE 2 MG/ML
2 INJECTION, SOLUTION INTRAMUSCULAR; INTRAVENOUS ONCE
Status: COMPLETED | OUTPATIENT
Start: 2023-01-01 | End: 2023-01-01

## 2023-01-01 RX ORDER — DEXTROSE 50 % IN WATER (D50W) INTRAVENOUS SYRINGE
25
Status: DISCONTINUED | OUTPATIENT
Start: 2023-01-01 | End: 2023-01-01

## 2023-01-01 RX ORDER — METOCLOPRAMIDE 10 MG/1
5 TABLET ORAL 2 TIMES DAILY PRN
Status: DISCONTINUED | OUTPATIENT
Start: 2023-01-01 | End: 2023-01-01

## 2023-01-01 RX ORDER — ACETAMINOPHEN 325 MG/1
650 TABLET ORAL EVERY 6 HOURS
Status: DISCONTINUED | OUTPATIENT
Start: 2023-01-01 | End: 2023-01-01

## 2023-01-01 RX ADMIN — PANTOPRAZOLE SODIUM 40 MG: 40 TABLET, DELAYED RELEASE ORAL at 06:53

## 2023-01-01 RX ADMIN — Medication 3 L/MIN: at 04:11

## 2023-01-01 RX ADMIN — ENOXAPARIN SODIUM 40 MG: 40 INJECTION SUBCUTANEOUS at 08:36

## 2023-01-01 RX ADMIN — ASPIRIN 81 MG CHEWABLE TABLET 81 MG: 81 TABLET CHEWABLE at 08:36

## 2023-01-01 RX ADMIN — ASPIRIN 81 MG CHEWABLE TABLET 81 MG: 81 TABLET CHEWABLE at 09:38

## 2023-01-01 RX ADMIN — OXYCODONE HYDROCHLORIDE AND ACETAMINOPHEN 1 TABLET: 5; 325 TABLET ORAL at 14:03

## 2023-01-01 RX ADMIN — IPRATROPIUM BROMIDE AND ALBUTEROL SULFATE 3 ML: 2.5; .5 SOLUTION RESPIRATORY (INHALATION) at 11:37

## 2023-01-01 RX ADMIN — ACETAMINOPHEN 650 MG: 325 TABLET ORAL at 20:50

## 2023-01-01 RX ADMIN — FORMOTEROL FUMARATE DIHYDRATE 20 MCG: 20 SOLUTION RESPIRATORY (INHALATION) at 19:19

## 2023-01-01 RX ADMIN — Medication: at 06:00

## 2023-01-01 RX ADMIN — FORMOTEROL FUMARATE DIHYDRATE 20 MCG: 20 SOLUTION RESPIRATORY (INHALATION) at 19:29

## 2023-01-01 RX ADMIN — SENNOSIDES AND DOCUSATE SODIUM 2 TABLET: 50; 8.6 TABLET ORAL at 10:11

## 2023-01-01 RX ADMIN — IPRATROPIUM BROMIDE AND ALBUTEROL SULFATE 3 ML: 2.5; .5 SOLUTION RESPIRATORY (INHALATION) at 14:55

## 2023-01-01 RX ADMIN — ALBUTEROL SULFATE 2.5 MG: 2.5 SOLUTION RESPIRATORY (INHALATION) at 07:17

## 2023-01-01 RX ADMIN — ALBUTEROL SULFATE 2.5 MG: 2.5 SOLUTION RESPIRATORY (INHALATION) at 20:21

## 2023-01-01 RX ADMIN — MORPHINE SULFATE 2 MG: 2 INJECTION, SOLUTION INTRAMUSCULAR; INTRAVENOUS at 14:16

## 2023-01-01 RX ADMIN — Medication 50 MCG: at 09:06

## 2023-01-01 RX ADMIN — OXYCODONE HYDROCHLORIDE AND ACETAMINOPHEN 1 TABLET: 5; 325 TABLET ORAL at 06:29

## 2023-01-01 RX ADMIN — KETOTIFEN FUMARATE 1 DROP: 0.25 SOLUTION/ DROPS OPHTHALMIC at 21:35

## 2023-01-01 RX ADMIN — MORPHINE SULFATE 2 MG: 2 INJECTION, SOLUTION INTRAMUSCULAR; INTRAVENOUS at 15:18

## 2023-01-01 RX ADMIN — Medication 50 MCG: at 09:38

## 2023-01-01 RX ADMIN — GABAPENTIN 200 MG: 100 CAPSULE ORAL at 11:10

## 2023-01-01 RX ADMIN — METOPROLOL SUCCINATE 12.5 MG: 25 TABLET, EXTENDED RELEASE ORAL at 09:38

## 2023-01-01 RX ADMIN — OXYCODONE HYDROCHLORIDE 5 MG: 5 TABLET ORAL at 04:20

## 2023-01-01 RX ADMIN — AZITHROMYCIN 250 MG: 250 TABLET, FILM COATED ORAL at 09:06

## 2023-01-01 RX ADMIN — DULOXETINE HYDROCHLORIDE 30 MG: 30 CAPSULE, DELAYED RELEASE ORAL at 09:52

## 2023-01-01 RX ADMIN — KETOTIFEN FUMARATE 1 DROP: 0.25 SOLUTION/ DROPS OPHTHALMIC at 21:11

## 2023-01-01 RX ADMIN — SENNOSIDES 8.6 MG: 8.6 TABLET, FILM COATED ORAL at 20:31

## 2023-01-01 RX ADMIN — OXYCODONE HYDROCHLORIDE 5 MG: 5 TABLET ORAL at 06:37

## 2023-01-01 RX ADMIN — ACETAMINOPHEN 650 MG: 325 TABLET ORAL at 15:03

## 2023-01-01 RX ADMIN — OXYCODONE HYDROCHLORIDE 5 MG: 5 TABLET ORAL at 04:43

## 2023-01-01 RX ADMIN — SENNOSIDES 8.6 MG: 8.6 TABLET, FILM COATED ORAL at 09:38

## 2023-01-01 RX ADMIN — ASPIRIN 81 MG CHEWABLE TABLET 81 MG: 81 TABLET CHEWABLE at 11:09

## 2023-01-01 RX ADMIN — VANCOMYCIN HYDROCHLORIDE 1000 MG: 1 INJECTION, SOLUTION INTRAVENOUS at 11:08

## 2023-01-01 RX ADMIN — ALBUTEROL SULFATE 2.5 MG: 2.5 SOLUTION RESPIRATORY (INHALATION) at 19:29

## 2023-01-01 RX ADMIN — SENNOSIDES 8.6 MG: 8.6 TABLET, FILM COATED ORAL at 11:10

## 2023-01-01 RX ADMIN — GABAPENTIN 200 MG: 100 CAPSULE ORAL at 08:36

## 2023-01-01 RX ADMIN — ALBUTEROL SULFATE 2.5 MG: 2.5 SOLUTION RESPIRATORY (INHALATION) at 19:19

## 2023-01-01 RX ADMIN — MONTELUKAST 10 MG: 10 TABLET, FILM COATED ORAL at 09:38

## 2023-01-01 RX ADMIN — SODIUM CHLORIDE, POTASSIUM CHLORIDE, SODIUM LACTATE AND CALCIUM CHLORIDE 250 ML: 600; 310; 30; 20 INJECTION, SOLUTION INTRAVENOUS at 12:30

## 2023-01-01 RX ADMIN — OXYCODONE HYDROCHLORIDE AND ACETAMINOPHEN 1 TABLET: 5; 325 TABLET ORAL at 10:06

## 2023-01-01 RX ADMIN — LIDOCAINE 1 PATCH: 4 PATCH TOPICAL at 10:09

## 2023-01-01 RX ADMIN — GUAIFENESIN 600 MG: 600 TABLET, EXTENDED RELEASE ORAL at 22:30

## 2023-01-01 RX ADMIN — SILDENAFIL 20 MG: 20 TABLET ORAL at 15:41

## 2023-01-01 RX ADMIN — DULOXETINE HYDROCHLORIDE 30 MG: 30 CAPSULE, DELAYED RELEASE ORAL at 10:11

## 2023-01-01 RX ADMIN — Medication 1 CAPSULE: at 10:03

## 2023-01-01 RX ADMIN — ALBUTEROL SULFATE 2.5 MG: 2.5 SOLUTION RESPIRATORY (INHALATION) at 15:34

## 2023-01-01 RX ADMIN — SODIUM CHLORIDE 1000 ML: 9 INJECTION, SOLUTION INTRAVENOUS at 22:14

## 2023-01-01 RX ADMIN — ATORVASTATIN CALCIUM 40 MG: 40 TABLET, FILM COATED ORAL at 20:46

## 2023-01-01 RX ADMIN — SILDENAFIL 20 MG: 20 TABLET ORAL at 09:06

## 2023-01-01 RX ADMIN — METOPROLOL SUCCINATE 12.5 MG: 25 TABLET, EXTENDED RELEASE ORAL at 08:34

## 2023-01-01 RX ADMIN — Medication 50 MCG: at 21:09

## 2023-01-01 RX ADMIN — FERROUS SULFATE TAB 325 MG (65 MG ELEMENTAL FE) 65 MG OF IRON: 325 (65 FE) TAB at 09:38

## 2023-01-01 RX ADMIN — Medication: at 08:25

## 2023-01-01 RX ADMIN — HEPARIN SODIUM 1200 UNITS/HR: 10000 INJECTION, SOLUTION INTRAVENOUS at 04:07

## 2023-01-01 RX ADMIN — MONTELUKAST 10 MG: 10 TABLET, FILM COATED ORAL at 08:46

## 2023-01-01 RX ADMIN — ACETAMINOPHEN 650 MG: 325 TABLET ORAL at 14:15

## 2023-01-01 RX ADMIN — FLUDEOXYGLUCOSE F 18 10.3 MILLICURIE: 200 INJECTION, SOLUTION INTRAVENOUS at 10:50

## 2023-01-01 RX ADMIN — HEPARIN SODIUM 5000 UNITS: 5000 INJECTION INTRAVENOUS; SUBCUTANEOUS at 06:30

## 2023-01-01 RX ADMIN — ACETAMINOPHEN 650 MG: 325 TABLET ORAL at 22:29

## 2023-01-01 RX ADMIN — Medication 50 MCG: at 20:31

## 2023-01-01 RX ADMIN — DULOXETINE HYDROCHLORIDE 30 MG: 30 CAPSULE, DELAYED RELEASE ORAL at 08:46

## 2023-01-01 RX ADMIN — GUAIFENESIN 600 MG: 600 TABLET, EXTENDED RELEASE ORAL at 21:08

## 2023-01-01 RX ADMIN — GABAPENTIN 200 MG: 100 CAPSULE ORAL at 22:30

## 2023-01-01 RX ADMIN — MONTELUKAST 10 MG: 10 TABLET, FILM COATED ORAL at 09:33

## 2023-01-01 RX ADMIN — GUAIFENESIN 600 MG: 600 TABLET, EXTENDED RELEASE ORAL at 14:42

## 2023-01-01 RX ADMIN — BUDESONIDE INHALATION 0.5 MG: 0.5 SUSPENSION RESPIRATORY (INHALATION) at 19:20

## 2023-01-01 RX ADMIN — ENOXAPARIN SODIUM 40 MG: 40 INJECTION SUBCUTANEOUS at 08:46

## 2023-01-01 RX ADMIN — OXYCODONE HYDROCHLORIDE AND ACETAMINOPHEN 1 TABLET: 5; 325 TABLET ORAL at 14:42

## 2023-01-01 RX ADMIN — FUROSEMIDE 40 MG: 10 INJECTION, SOLUTION INTRAMUSCULAR; INTRAVENOUS at 09:41

## 2023-01-01 RX ADMIN — FORMOTEROL FUMARATE DIHYDRATE 20 MCG: 20 SOLUTION RESPIRATORY (INHALATION) at 08:11

## 2023-01-01 RX ADMIN — LOSARTAN POTASSIUM 50 MG: 50 TABLET, FILM COATED ORAL at 08:34

## 2023-01-01 RX ADMIN — SENNOSIDES 8.6 MG: 8.6 TABLET, FILM COATED ORAL at 21:30

## 2023-01-01 RX ADMIN — METHYLPREDNISOLONE SODIUM SUCCINATE 40 MG: 40 INJECTION, POWDER, FOR SOLUTION INTRAMUSCULAR; INTRAVENOUS at 09:20

## 2023-01-01 RX ADMIN — OXYCODONE HYDROCHLORIDE AND ACETAMINOPHEN 1 TABLET: 5; 325 TABLET ORAL at 15:06

## 2023-01-01 RX ADMIN — OXYCODONE HYDROCHLORIDE AND ACETAMINOPHEN 1 TABLET: 5; 325 TABLET ORAL at 12:29

## 2023-01-01 RX ADMIN — OXYCODONE HYDROCHLORIDE AND ACETAMINOPHEN 1 TABLET: 5; 325 TABLET ORAL at 04:07

## 2023-01-01 RX ADMIN — MAGNESIUM SULFATE HEPTAHYDRATE 2 G: 40 INJECTION, SOLUTION INTRAVENOUS at 11:05

## 2023-01-01 RX ADMIN — FORMOTEROL FUMARATE DIHYDRATE 20 MCG: 20 SOLUTION RESPIRATORY (INHALATION) at 19:20

## 2023-01-01 RX ADMIN — OXYCODONE HYDROCHLORIDE 5 MG: 5 TABLET ORAL at 22:42

## 2023-01-01 RX ADMIN — DULOXETINE HYDROCHLORIDE 30 MG: 30 CAPSULE, DELAYED RELEASE ORAL at 09:33

## 2023-01-01 RX ADMIN — HEPARIN SODIUM 5000 UNITS: 5000 INJECTION INTRAVENOUS; SUBCUTANEOUS at 22:32

## 2023-01-01 RX ADMIN — GABAPENTIN 200 MG: 100 CAPSULE ORAL at 15:09

## 2023-01-01 RX ADMIN — HYDROMORPHONE HYDROCHLORIDE 0.5 MG: 1 INJECTION, SOLUTION INTRAMUSCULAR; INTRAVENOUS; SUBCUTANEOUS at 21:36

## 2023-01-01 RX ADMIN — ENOXAPARIN SODIUM 40 MG: 40 INJECTION SUBCUTANEOUS at 11:08

## 2023-01-01 RX ADMIN — FORMOTEROL FUMARATE DIHYDRATE 20 MCG: 20 SOLUTION RESPIRATORY (INHALATION) at 07:49

## 2023-01-01 RX ADMIN — OXYCODONE HYDROCHLORIDE 5 MG: 5 TABLET ORAL at 08:23

## 2023-01-01 RX ADMIN — SODIUM CHLORIDE 75 ML/HR: 9 INJECTION, SOLUTION INTRAVENOUS at 05:52

## 2023-01-01 RX ADMIN — Medication 50 MCG: at 08:46

## 2023-01-01 RX ADMIN — MORPHINE SULFATE 2 MG: 2 INJECTION, SOLUTION INTRAMUSCULAR; INTRAVENOUS at 17:13

## 2023-01-01 RX ADMIN — GABAPENTIN 200 MG: 100 CAPSULE ORAL at 14:42

## 2023-01-01 RX ADMIN — PIPERACILLIN SODIUM AND TAZOBACTAM SODIUM 3.38 G: 3; .375 INJECTION, SOLUTION INTRAVENOUS at 06:29

## 2023-01-01 RX ADMIN — PANTOPRAZOLE SODIUM 40 MG: 40 TABLET, DELAYED RELEASE ORAL at 06:25

## 2023-01-01 RX ADMIN — KETOTIFEN FUMARATE 1 DROP: 0.25 SOLUTION/ DROPS OPHTHALMIC at 09:39

## 2023-01-01 RX ADMIN — ACETAMINOPHEN 650 MG: 325 TABLET ORAL at 10:11

## 2023-01-01 RX ADMIN — PANTOPRAZOLE SODIUM 40 MG: 40 TABLET, DELAYED RELEASE ORAL at 06:29

## 2023-01-01 RX ADMIN — SILDENAFIL 20 MG: 20 TABLET ORAL at 14:44

## 2023-01-01 RX ADMIN — SENNOSIDES 8.6 MG: 8.6 TABLET, FILM COATED ORAL at 09:34

## 2023-01-01 RX ADMIN — OXYCODONE HYDROCHLORIDE 5 MG: 5 TABLET ORAL at 00:25

## 2023-01-01 RX ADMIN — IPRATROPIUM BROMIDE AND ALBUTEROL SULFATE 3 ML: 2.5; .5 SOLUTION RESPIRATORY (INHALATION) at 07:27

## 2023-01-01 RX ADMIN — BUDESONIDE INHALATION 0.5 MG: 0.5 SUSPENSION RESPIRATORY (INHALATION) at 19:19

## 2023-01-01 RX ADMIN — HYDRALAZINE HYDROCHLORIDE 10 MG: 20 INJECTION INTRAMUSCULAR; INTRAVENOUS at 05:45

## 2023-01-01 RX ADMIN — METOPROLOL SUCCINATE 12.5 MG: 25 TABLET, EXTENDED RELEASE ORAL at 10:55

## 2023-01-01 RX ADMIN — SILDENAFIL 20 MG: 20 TABLET ORAL at 15:09

## 2023-01-01 RX ADMIN — GABAPENTIN 200 MG: 100 CAPSULE ORAL at 09:11

## 2023-01-01 RX ADMIN — ALBUTEROL SULFATE 2.5 MG: 2.5 SOLUTION RESPIRATORY (INHALATION) at 07:49

## 2023-01-01 RX ADMIN — ACETAMINOPHEN 650 MG: 325 TABLET ORAL at 09:38

## 2023-01-01 RX ADMIN — ACETAMINOPHEN 650 MG: 325 TABLET ORAL at 20:44

## 2023-01-01 RX ADMIN — BUDESONIDE INHALATION 0.5 MG: 0.5 SUSPENSION RESPIRATORY (INHALATION) at 08:02

## 2023-01-01 RX ADMIN — DULOXETINE HYDROCHLORIDE 30 MG: 30 CAPSULE, DELAYED RELEASE ORAL at 09:00

## 2023-01-01 RX ADMIN — Medication: at 08:19

## 2023-01-01 RX ADMIN — Medication 50 MCG: at 08:36

## 2023-01-01 RX ADMIN — HYDROMORPHONE HYDROCHLORIDE 0.5 MG: 1 INJECTION, SOLUTION INTRAMUSCULAR; INTRAVENOUS; SUBCUTANEOUS at 15:28

## 2023-01-01 RX ADMIN — ALBUTEROL SULFATE 2.5 MG: 2.5 SOLUTION RESPIRATORY (INHALATION) at 20:26

## 2023-01-01 RX ADMIN — METHYLPREDNISOLONE SODIUM SUCCINATE 40 MG: 40 INJECTION, POWDER, FOR SOLUTION INTRAMUSCULAR; INTRAVENOUS at 08:24

## 2023-01-01 RX ADMIN — POLYETHYLENE GLYCOL 3350 17 G: 17 POWDER, FOR SOLUTION ORAL at 10:02

## 2023-01-01 RX ADMIN — OXYCODONE HYDROCHLORIDE 10 MG: 5 TABLET ORAL at 19:45

## 2023-01-01 RX ADMIN — ATORVASTATIN CALCIUM 40 MG: 40 TABLET, FILM COATED ORAL at 21:46

## 2023-01-01 RX ADMIN — KETOTIFEN FUMARATE 1 DROP: 0.25 SOLUTION/ DROPS OPHTHALMIC at 10:03

## 2023-01-01 RX ADMIN — ALBUTEROL SULFATE 2.5 MG: 2.5 SOLUTION RESPIRATORY (INHALATION) at 13:52

## 2023-01-01 RX ADMIN — METOPROLOL SUCCINATE 12.5 MG: 25 TABLET, EXTENDED RELEASE ORAL at 11:08

## 2023-01-01 RX ADMIN — BUDESONIDE INHALATION 0.5 MG: 0.5 SUSPENSION RESPIRATORY (INHALATION) at 07:52

## 2023-01-01 RX ADMIN — Medication 50 MCG: at 11:09

## 2023-01-01 RX ADMIN — FORMOTEROL FUMARATE DIHYDRATE 20 MCG: 20 SOLUTION RESPIRATORY (INHALATION) at 07:51

## 2023-01-01 RX ADMIN — Medication 50 MCG: at 10:02

## 2023-01-01 RX ADMIN — PIPERACILLIN SODIUM AND TAZOBACTAM SODIUM 3.38 G: 3; .375 INJECTION, SOLUTION INTRAVENOUS at 18:04

## 2023-01-01 RX ADMIN — ACETAMINOPHEN 650 MG: 325 TABLET ORAL at 09:07

## 2023-01-01 RX ADMIN — Medication 4.5 MG: at 21:08

## 2023-01-01 RX ADMIN — BUDESONIDE INHALATION 0.5 MG: 0.5 SUSPENSION RESPIRATORY (INHALATION) at 19:28

## 2023-01-01 RX ADMIN — ATORVASTATIN CALCIUM 40 MG: 40 TABLET, FILM COATED ORAL at 22:30

## 2023-01-01 RX ADMIN — GUAIFENESIN 600 MG: 600 TABLET, EXTENDED RELEASE ORAL at 08:36

## 2023-01-01 RX ADMIN — ENOXAPARIN SODIUM 40 MG: 40 INJECTION SUBCUTANEOUS at 10:19

## 2023-01-01 RX ADMIN — KETOTIFEN FUMARATE 1 DROP: 0.25 SOLUTION/ DROPS OPHTHALMIC at 08:40

## 2023-01-01 RX ADMIN — Medication 1 CAPSULE: at 09:00

## 2023-01-01 RX ADMIN — BUDESONIDE INHALATION 0.5 MG: 0.5 SUSPENSION RESPIRATORY (INHALATION) at 07:49

## 2023-01-01 RX ADMIN — OXYCODONE HYDROCHLORIDE AND ACETAMINOPHEN 1 TABLET: 5; 325 TABLET ORAL at 12:57

## 2023-01-01 RX ADMIN — SODIUM CHLORIDE 125 ML/HR: 9 INJECTION, SOLUTION INTRAVENOUS at 02:02

## 2023-01-01 RX ADMIN — OXYCODONE HYDROCHLORIDE AND ACETAMINOPHEN 1 TABLET: 5; 325 TABLET ORAL at 20:51

## 2023-01-01 RX ADMIN — GABAPENTIN 200 MG: 100 CAPSULE ORAL at 10:20

## 2023-01-01 RX ADMIN — OXYCODONE HYDROCHLORIDE AND ACETAMINOPHEN 1 TABLET: 5; 325 TABLET ORAL at 13:15

## 2023-01-01 RX ADMIN — ASPIRIN 81 MG CHEWABLE TABLET 81 MG: 81 TABLET CHEWABLE at 09:51

## 2023-01-01 RX ADMIN — GABAPENTIN 200 MG: 100 CAPSULE ORAL at 15:41

## 2023-01-01 RX ADMIN — KETOTIFEN FUMARATE 1 DROP: 0.25 SOLUTION/ DROPS OPHTHALMIC at 16:49

## 2023-01-01 RX ADMIN — OXYCODONE HYDROCHLORIDE AND ACETAMINOPHEN 1 TABLET: 5; 325 TABLET ORAL at 05:52

## 2023-01-01 RX ADMIN — OXYCODONE HYDROCHLORIDE AND ACETAMINOPHEN 1 TABLET: 5; 325 TABLET ORAL at 09:43

## 2023-01-01 RX ADMIN — GUAIFENESIN 200 MG: 200 SOLUTION ORAL at 20:44

## 2023-01-01 RX ADMIN — METOPROLOL SUCCINATE 12.5 MG: 25 TABLET, EXTENDED RELEASE ORAL at 08:36

## 2023-01-01 RX ADMIN — POLYETHYLENE GLYCOL 3350 17 G: 17 POWDER, FOR SOLUTION ORAL at 09:38

## 2023-01-01 RX ADMIN — PANTOPRAZOLE SODIUM 40 MG: 40 TABLET, DELAYED RELEASE ORAL at 06:06

## 2023-01-01 RX ADMIN — GUAIFENESIN 600 MG: 600 TABLET, EXTENDED RELEASE ORAL at 20:30

## 2023-01-01 RX ADMIN — Medication 2 L/MIN: at 07:27

## 2023-01-01 RX ADMIN — BUDESONIDE INHALATION 0.5 MG: 0.5 SUSPENSION RESPIRATORY (INHALATION) at 20:26

## 2023-01-01 RX ADMIN — Medication 4.5 MG: at 20:32

## 2023-01-01 RX ADMIN — PIPERACILLIN SODIUM AND TAZOBACTAM SODIUM 3.38 G: 3; .375 INJECTION, SOLUTION INTRAVENOUS at 04:07

## 2023-01-01 RX ADMIN — FORMOTEROL FUMARATE DIHYDRATE 20 MCG: 20 SOLUTION RESPIRATORY (INHALATION) at 08:02

## 2023-01-01 RX ADMIN — KETOTIFEN FUMARATE 1 DROP: 0.25 SOLUTION/ DROPS OPHTHALMIC at 09:48

## 2023-01-01 RX ADMIN — PIPERACILLIN SODIUM AND TAZOBACTAM SODIUM 3.38 G: 3; .375 INJECTION, SOLUTION INTRAVENOUS at 22:08

## 2023-01-01 RX ADMIN — IOHEXOL 75 ML: 350 INJECTION, SOLUTION INTRAVENOUS at 14:12

## 2023-01-01 RX ADMIN — ACETAMINOPHEN 650 MG: 325 TABLET ORAL at 14:03

## 2023-01-01 RX ADMIN — ATORVASTATIN CALCIUM 40 MG: 40 TABLET, FILM COATED ORAL at 20:44

## 2023-01-01 RX ADMIN — SODIUM CHLORIDE 500 ML: 9 INJECTION, SOLUTION INTRAVENOUS at 16:46

## 2023-01-01 RX ADMIN — FORMOTEROL FUMARATE DIHYDRATE 20 MCG: 20 SOLUTION RESPIRATORY (INHALATION) at 08:16

## 2023-01-01 RX ADMIN — OXYCODONE HYDROCHLORIDE AND ACETAMINOPHEN 1 TABLET: 5; 325 TABLET ORAL at 04:22

## 2023-01-01 RX ADMIN — HYDROMORPHONE HYDROCHLORIDE 0.5 MG: 1 INJECTION, SOLUTION INTRAMUSCULAR; INTRAVENOUS; SUBCUTANEOUS at 03:07

## 2023-01-01 RX ADMIN — SENNOSIDES 8.6 MG: 8.6 TABLET, FILM COATED ORAL at 10:01

## 2023-01-01 RX ADMIN — OXYCODONE HYDROCHLORIDE 5 MG: 5 TABLET ORAL at 09:29

## 2023-01-01 RX ADMIN — MONTELUKAST 10 MG: 10 TABLET, FILM COATED ORAL at 11:10

## 2023-01-01 RX ADMIN — PIPERACILLIN SODIUM AND TAZOBACTAM SODIUM 3.38 G: 3; .375 INJECTION, SOLUTION INTRAVENOUS at 15:30

## 2023-01-01 RX ADMIN — KETOTIFEN FUMARATE 1 DROP: 0.25 SOLUTION/ DROPS OPHTHALMIC at 09:43

## 2023-01-01 RX ADMIN — SILDENAFIL 20 MG: 20 TABLET ORAL at 15:03

## 2023-01-01 RX ADMIN — ALBUTEROL SULFATE 2.5 MG: 2.5 SOLUTION RESPIRATORY (INHALATION) at 14:36

## 2023-01-01 RX ADMIN — BARIUM SULFATE 150 ML: 0.81 POWDER, FOR SUSPENSION ORAL at 11:18

## 2023-01-01 RX ADMIN — HEPARIN SODIUM 5000 UNITS: 5000 INJECTION INTRAVENOUS; SUBCUTANEOUS at 18:09

## 2023-01-01 RX ADMIN — OXYCODONE HYDROCHLORIDE 10 MG: 5 TABLET ORAL at 06:07

## 2023-01-01 RX ADMIN — DULOXETINE HYDROCHLORIDE 30 MG: 30 CAPSULE, DELAYED RELEASE ORAL at 11:10

## 2023-01-01 RX ADMIN — FORMOTEROL FUMARATE DIHYDRATE 20 MCG: 20 SOLUTION RESPIRATORY (INHALATION) at 20:20

## 2023-01-01 RX ADMIN — OXYCODONE HYDROCHLORIDE 5 MG: 5 TABLET ORAL at 01:02

## 2023-01-01 RX ADMIN — METHYLPREDNISOLONE SODIUM SUCCINATE 40 MG: 40 INJECTION, POWDER, FOR SOLUTION INTRAMUSCULAR; INTRAVENOUS at 20:50

## 2023-01-01 RX ADMIN — HYDRALAZINE HYDROCHLORIDE 5 MG: 20 INJECTION INTRAMUSCULAR; INTRAVENOUS at 18:10

## 2023-01-01 RX ADMIN — FERROUS SULFATE TAB 325 MG (65 MG ELEMENTAL FE) 1 TABLET: 325 (65 FE) TAB at 10:11

## 2023-01-01 RX ADMIN — AZITHROMYCIN 500 MG: 500 INJECTION, POWDER, LYOPHILIZED, FOR SOLUTION INTRAVENOUS at 14:10

## 2023-01-01 RX ADMIN — MORPHINE SULFATE: 10 INJECTION INTRAVENOUS at 20:14

## 2023-01-01 RX ADMIN — KETOROLAC TROMETHAMINE 15 MG: 15 INJECTION, SOLUTION INTRAMUSCULAR; INTRAVENOUS at 16:12

## 2023-01-01 RX ADMIN — ATORVASTATIN CALCIUM 40 MG: 40 TABLET, FILM COATED ORAL at 20:50

## 2023-01-01 RX ADMIN — GUAIFENESIN 200 MG: 200 SOLUTION ORAL at 09:37

## 2023-01-01 RX ADMIN — HEPARIN SODIUM 5000 UNITS: 5000 INJECTION INTRAVENOUS; SUBCUTANEOUS at 14:00

## 2023-01-01 RX ADMIN — Medication 4.5 MG: at 20:47

## 2023-01-01 RX ADMIN — GABAPENTIN 200 MG: 100 CAPSULE ORAL at 21:08

## 2023-01-01 RX ADMIN — LEVOFLOXACIN 750 MG: 750 INJECTION, SOLUTION INTRAVENOUS at 13:14

## 2023-01-01 RX ADMIN — PANTOPRAZOLE SODIUM 40 MG: 40 TABLET, DELAYED RELEASE ORAL at 06:23

## 2023-01-01 RX ADMIN — GUAIFENESIN 200 MG: 200 SOLUTION ORAL at 16:47

## 2023-01-01 RX ADMIN — GABAPENTIN 200 MG: 100 CAPSULE ORAL at 14:57

## 2023-01-01 RX ADMIN — ENOXAPARIN SODIUM 40 MG: 40 INJECTION SUBCUTANEOUS at 10:02

## 2023-01-01 RX ADMIN — PANTOPRAZOLE SODIUM 40 MG: 40 TABLET, DELAYED RELEASE ORAL at 08:46

## 2023-01-01 RX ADMIN — GUAIFENESIN 600 MG: 600 TABLET, EXTENDED RELEASE ORAL at 21:30

## 2023-01-01 RX ADMIN — GABAPENTIN 200 MG: 100 CAPSULE ORAL at 15:00

## 2023-01-01 RX ADMIN — GUAIFENESIN 600 MG: 600 TABLET, EXTENDED RELEASE ORAL at 09:06

## 2023-01-01 RX ADMIN — Medication 50 MCG: at 20:47

## 2023-01-01 RX ADMIN — FORMOTEROL FUMARATE DIHYDRATE 20 MCG: 20 SOLUTION RESPIRATORY (INHALATION) at 20:26

## 2023-01-01 RX ADMIN — Medication: at 07:50

## 2023-01-01 RX ADMIN — OXYCODONE HYDROCHLORIDE 5 MG: 5 TABLET ORAL at 20:48

## 2023-01-01 RX ADMIN — ALBUTEROL SULFATE 2.5 MG: 2.5 SOLUTION RESPIRATORY (INHALATION) at 15:01

## 2023-01-01 RX ADMIN — POTASSIUM CHLORIDE 40 MEQ: 1500 TABLET, EXTENDED RELEASE ORAL at 11:07

## 2023-01-01 RX ADMIN — DEXTROSE AND SODIUM CHLORIDE 75 ML/HR: 5; 450 INJECTION, SOLUTION INTRAVENOUS at 15:47

## 2023-01-01 RX ADMIN — OXYCODONE HYDROCHLORIDE AND ACETAMINOPHEN 1 TABLET: 5; 325 TABLET ORAL at 08:36

## 2023-01-01 RX ADMIN — HEPARIN SODIUM 1200 UNITS/HR: 10000 INJECTION, SOLUTION INTRAVENOUS at 08:06

## 2023-01-01 RX ADMIN — SENNOSIDES 8.6 MG: 8.6 TABLET, FILM COATED ORAL at 20:44

## 2023-01-01 RX ADMIN — DULOXETINE HYDROCHLORIDE 30 MG: 30 CAPSULE, DELAYED RELEASE ORAL at 10:22

## 2023-01-01 RX ADMIN — GABAPENTIN 200 MG: 100 CAPSULE ORAL at 14:03

## 2023-01-01 RX ADMIN — OXYCODONE HYDROCHLORIDE 5 MG: 5 TABLET ORAL at 05:45

## 2023-01-01 RX ADMIN — ACETAMINOPHEN 650 MG: 325 TABLET ORAL at 15:30

## 2023-01-01 RX ADMIN — SENNOSIDES 8.6 MG: 8.6 TABLET, FILM COATED ORAL at 09:51

## 2023-01-01 RX ADMIN — ALBUTEROL SULFATE 2.5 MG: 2.5 SOLUTION RESPIRATORY (INHALATION) at 08:16

## 2023-01-01 RX ADMIN — GABAPENTIN 200 MG: 100 CAPSULE ORAL at 10:02

## 2023-01-01 RX ADMIN — KETOTIFEN FUMARATE 1 DROP: 0.25 SOLUTION/ DROPS OPHTHALMIC at 22:08

## 2023-01-01 RX ADMIN — OXYCODONE HYDROCHLORIDE AND ACETAMINOPHEN 1 TABLET: 5; 325 TABLET ORAL at 00:09

## 2023-01-01 RX ADMIN — ACETAMINOPHEN 650 MG: 325 TABLET ORAL at 21:30

## 2023-01-01 RX ADMIN — OXYCODONE HYDROCHLORIDE AND ACETAMINOPHEN 1 TABLET: 5; 325 TABLET ORAL at 06:23

## 2023-01-01 RX ADMIN — METHYLPREDNISOLONE SODIUM SUCCINATE 40 MG: 40 INJECTION, POWDER, FOR SOLUTION INTRAMUSCULAR; INTRAVENOUS at 00:25

## 2023-01-01 RX ADMIN — GABAPENTIN 200 MG: 100 CAPSULE ORAL at 21:30

## 2023-01-01 RX ADMIN — GUAIFENESIN 600 MG: 600 TABLET, EXTENDED RELEASE ORAL at 09:38

## 2023-01-01 RX ADMIN — GABAPENTIN 200 MG: 100 CAPSULE ORAL at 09:38

## 2023-01-01 RX ADMIN — ACETAMINOPHEN 650 MG: 325 TABLET ORAL at 14:56

## 2023-01-01 RX ADMIN — PIPERACILLIN SODIUM AND TAZOBACTAM SODIUM 3.38 G: 3; .375 INJECTION, SOLUTION INTRAVENOUS at 09:52

## 2023-01-01 RX ADMIN — PANTOPRAZOLE SODIUM 40 MG: 40 TABLET, DELAYED RELEASE ORAL at 06:07

## 2023-01-01 RX ADMIN — GABAPENTIN 200 MG: 100 CAPSULE ORAL at 15:30

## 2023-01-01 RX ADMIN — OXYCODONE HYDROCHLORIDE AND ACETAMINOPHEN 1 TABLET: 5; 325 TABLET ORAL at 22:15

## 2023-01-01 RX ADMIN — ALBUTEROL SULFATE 2.5 MG: 2.5 SOLUTION RESPIRATORY (INHALATION) at 19:28

## 2023-01-01 RX ADMIN — POLYETHYLENE GLYCOL 3350 17 G: 17 POWDER, FOR SOLUTION ORAL at 09:07

## 2023-01-01 RX ADMIN — DULOXETINE HYDROCHLORIDE 30 MG: 30 CAPSULE, DELAYED RELEASE ORAL at 09:07

## 2023-01-01 RX ADMIN — OXYCODONE HYDROCHLORIDE AND ACETAMINOPHEN 1 TABLET: 5; 325 TABLET ORAL at 16:47

## 2023-01-01 RX ADMIN — BUDESONIDE INHALATION 0.5 MG: 0.5 SUSPENSION RESPIRATORY (INHALATION) at 07:17

## 2023-01-01 RX ADMIN — IPRATROPIUM BROMIDE AND ALBUTEROL SULFATE 3 ML: 2.5; .5 SOLUTION RESPIRATORY (INHALATION) at 11:47

## 2023-01-01 RX ADMIN — IPRATROPIUM BROMIDE AND ALBUTEROL SULFATE 3 ML: 2.5; .5 SOLUTION RESPIRATORY (INHALATION) at 07:30

## 2023-01-01 RX ADMIN — OXYCODONE HYDROCHLORIDE AND ACETAMINOPHEN 1 TABLET: 5; 325 TABLET ORAL at 20:28

## 2023-01-01 RX ADMIN — Medication 50 MCG: at 22:31

## 2023-01-01 RX ADMIN — DULOXETINE HYDROCHLORIDE 30 MG: 30 CAPSULE, DELAYED RELEASE ORAL at 18:09

## 2023-01-01 RX ADMIN — KETOROLAC TROMETHAMINE 15 MG: 15 INJECTION, SOLUTION INTRAMUSCULAR; INTRAVENOUS at 05:00

## 2023-01-01 RX ADMIN — OXYCODONE HYDROCHLORIDE AND ACETAMINOPHEN 1 TABLET: 5; 325 TABLET ORAL at 09:23

## 2023-01-01 RX ADMIN — AZITHROMYCIN MONOHYDRATE 500 MG: 500 INJECTION, POWDER, LYOPHILIZED, FOR SOLUTION INTRAVENOUS at 22:22

## 2023-01-01 RX ADMIN — OXYCODONE HYDROCHLORIDE 5 MG: 5 TABLET ORAL at 09:40

## 2023-01-01 RX ADMIN — OXYCODONE HYDROCHLORIDE 5 MG: 5 TABLET ORAL at 17:41

## 2023-01-01 RX ADMIN — OXYCODONE HYDROCHLORIDE 5 MG: 5 TABLET ORAL at 23:53

## 2023-01-01 RX ADMIN — HEPARIN SODIUM 5000 UNITS: 5000 INJECTION INTRAVENOUS; SUBCUTANEOUS at 06:07

## 2023-01-01 RX ADMIN — BUDESONIDE INHALATION 0.5 MG: 0.5 SUSPENSION RESPIRATORY (INHALATION) at 20:21

## 2023-01-01 RX ADMIN — IOHEXOL 75 ML: 350 INJECTION, SOLUTION INTRAVENOUS at 16:19

## 2023-01-01 RX ADMIN — SILDENAFIL 20 MG: 20 TABLET ORAL at 11:08

## 2023-01-01 RX ADMIN — METOPROLOL SUCCINATE 12.5 MG: 25 TABLET, EXTENDED RELEASE ORAL at 08:46

## 2023-01-01 RX ADMIN — ACETAMINOPHEN 650 MG: 325 TABLET ORAL at 21:09

## 2023-01-01 RX ADMIN — ALBUTEROL SULFATE 2.5 MG: 2.5 SOLUTION RESPIRATORY (INHALATION) at 19:20

## 2023-01-01 RX ADMIN — BUDESONIDE INHALATION 0.5 MG: 0.5 SUSPENSION RESPIRATORY (INHALATION) at 19:29

## 2023-01-01 RX ADMIN — SENNOSIDES 8.6 MG: 8.6 TABLET, FILM COATED ORAL at 08:40

## 2023-01-01 RX ADMIN — LOSARTAN POTASSIUM 50 MG: 50 TABLET, FILM COATED ORAL at 08:46

## 2023-01-01 RX ADMIN — OXYCODONE HYDROCHLORIDE AND ACETAMINOPHEN 1 TABLET: 5; 325 TABLET ORAL at 13:51

## 2023-01-01 RX ADMIN — METOPROLOL SUCCINATE 25 MG: 25 TABLET, EXTENDED RELEASE ORAL at 11:53

## 2023-01-01 RX ADMIN — AZITHROMYCIN MONOHYDRATE 500 MG: 500 INJECTION, POWDER, LYOPHILIZED, FOR SOLUTION INTRAVENOUS at 20:44

## 2023-01-01 RX ADMIN — SILDENAFIL 20 MG: 20 TABLET ORAL at 20:43

## 2023-01-01 RX ADMIN — PIPERACILLIN SODIUM AND TAZOBACTAM SODIUM 4.5 G: 4; .5 INJECTION, SOLUTION INTRAVENOUS at 22:12

## 2023-01-01 RX ADMIN — GABAPENTIN 200 MG: 100 CAPSULE ORAL at 14:15

## 2023-01-01 RX ADMIN — Medication 50 MCG: at 20:50

## 2023-01-01 RX ADMIN — IPRATROPIUM BROMIDE AND ALBUTEROL SULFATE 3 ML: 2.5; .5 SOLUTION RESPIRATORY (INHALATION) at 19:25

## 2023-01-01 RX ADMIN — Medication 4.5 MG: at 21:03

## 2023-01-01 RX ADMIN — ASPIRIN 81 MG CHEWABLE TABLET 81 MG: 81 TABLET CHEWABLE at 09:33

## 2023-01-01 RX ADMIN — OXYCODONE HYDROCHLORIDE AND ACETAMINOPHEN 1 TABLET: 5; 325 TABLET ORAL at 22:29

## 2023-01-01 RX ADMIN — OXYCODONE HYDROCHLORIDE AND ACETAMINOPHEN 1 TABLET: 5; 325 TABLET ORAL at 00:52

## 2023-01-01 RX ADMIN — BUDESONIDE INHALATION 0.5 MG: 0.5 SUSPENSION RESPIRATORY (INHALATION) at 08:16

## 2023-01-01 RX ADMIN — OXYCODONE HYDROCHLORIDE 5 MG: 5 TABLET ORAL at 16:43

## 2023-01-01 RX ADMIN — OXYCODONE HYDROCHLORIDE 10 MG: 5 TABLET ORAL at 21:46

## 2023-01-01 RX ADMIN — PANTOPRAZOLE SODIUM 40 MG: 40 TABLET, DELAYED RELEASE ORAL at 06:24

## 2023-01-01 RX ADMIN — GABAPENTIN 200 MG: 100 CAPSULE ORAL at 18:09

## 2023-01-01 RX ADMIN — PANTOPRAZOLE SODIUM 40 MG: 40 TABLET, DELAYED RELEASE ORAL at 06:26

## 2023-01-01 RX ADMIN — HYDRALAZINE HYDROCHLORIDE 10 MG: 20 INJECTION INTRAMUSCULAR; INTRAVENOUS at 06:41

## 2023-01-01 RX ADMIN — MONTELUKAST 10 MG: 10 TABLET, FILM COATED ORAL at 09:51

## 2023-01-01 RX ADMIN — Medication 4.5 MG: at 20:44

## 2023-01-01 RX ADMIN — SENNOSIDES 8.6 MG: 8.6 TABLET, FILM COATED ORAL at 21:09

## 2023-01-01 RX ADMIN — ALBUTEROL SULFATE 2.5 MG: 2.5 SOLUTION RESPIRATORY (INHALATION) at 07:52

## 2023-01-01 RX ADMIN — PIPERACILLIN SODIUM AND TAZOBACTAM SODIUM 3.38 G: 3; .375 INJECTION, SOLUTION INTRAVENOUS at 01:20

## 2023-01-01 RX ADMIN — FUROSEMIDE 40 MG: 10 INJECTION, SOLUTION INTRAMUSCULAR; INTRAVENOUS at 09:58

## 2023-01-01 RX ADMIN — FORMOTEROL FUMARATE DIHYDRATE 20 MCG: 20 SOLUTION RESPIRATORY (INHALATION) at 07:52

## 2023-01-01 RX ADMIN — GABAPENTIN 200 MG: 100 CAPSULE ORAL at 10:11

## 2023-01-01 RX ADMIN — LIDOCAINE 1 PATCH: 4 PATCH TOPICAL at 09:22

## 2023-01-01 RX ADMIN — Medication 50 MCG: at 10:21

## 2023-01-01 RX ADMIN — SILDENAFIL 20 MG: 20 TABLET ORAL at 14:15

## 2023-01-01 RX ADMIN — OXYCODONE HYDROCHLORIDE AND ACETAMINOPHEN 1 TABLET: 5; 325 TABLET ORAL at 09:37

## 2023-01-01 RX ADMIN — Medication 50 MCG: at 09:33

## 2023-01-01 RX ADMIN — GUAIFENESIN 600 MG: 600 TABLET, EXTENDED RELEASE ORAL at 21:03

## 2023-01-01 RX ADMIN — Medication: at 08:00

## 2023-01-01 RX ADMIN — LIDOCAINE 1 PATCH: 4 PATCH TOPICAL at 16:11

## 2023-01-01 RX ADMIN — ASPIRIN 81 MG CHEWABLE TABLET 81 MG: 81 TABLET CHEWABLE at 18:09

## 2023-01-01 RX ADMIN — POTASSIUM CHLORIDE 20 MEQ: 1.5 POWDER, FOR SOLUTION ORAL at 14:15

## 2023-01-01 RX ADMIN — ENOXAPARIN SODIUM 40 MG: 40 INJECTION SUBCUTANEOUS at 13:07

## 2023-01-01 RX ADMIN — OXYCODONE HYDROCHLORIDE 10 MG: 5 TABLET ORAL at 02:06

## 2023-01-01 RX ADMIN — ASPIRIN 81 MG CHEWABLE TABLET 81 MG: 81 TABLET CHEWABLE at 10:20

## 2023-01-01 RX ADMIN — LIDOCAINE 1 PATCH: 4 PATCH TOPICAL at 09:45

## 2023-01-01 RX ADMIN — OXYCODONE HYDROCHLORIDE AND ACETAMINOPHEN 1 TABLET: 5; 325 TABLET ORAL at 01:20

## 2023-01-01 RX ADMIN — VANCOMYCIN HYDROCHLORIDE 1250 MG: 5 INJECTION, POWDER, LYOPHILIZED, FOR SOLUTION INTRAVENOUS at 00:53

## 2023-01-01 RX ADMIN — IRON SUCROSE 200 MG: 20 INJECTION, SOLUTION INTRAVENOUS at 22:38

## 2023-01-01 RX ADMIN — ASPIRIN 81 MG CHEWABLE TABLET 81 MG: 81 TABLET CHEWABLE at 09:00

## 2023-01-01 RX ADMIN — GABAPENTIN 200 MG: 100 CAPSULE ORAL at 09:52

## 2023-01-01 RX ADMIN — GABAPENTIN 200 MG: 100 CAPSULE ORAL at 15:03

## 2023-01-01 RX ADMIN — ATORVASTATIN CALCIUM 40 MG: 40 TABLET, FILM COATED ORAL at 21:03

## 2023-01-01 RX ADMIN — OXYCODONE HYDROCHLORIDE AND ACETAMINOPHEN 1 TABLET: 5; 325 TABLET ORAL at 05:18

## 2023-01-01 RX ADMIN — SENNOSIDES 8.6 MG: 8.6 TABLET, FILM COATED ORAL at 09:06

## 2023-01-01 RX ADMIN — FORMOTEROL FUMARATE DIHYDRATE 20 MCG: 20 SOLUTION RESPIRATORY (INHALATION) at 07:17

## 2023-01-01 RX ADMIN — IPRATROPIUM BROMIDE AND ALBUTEROL SULFATE 3 ML: 2.5; .5 SOLUTION RESPIRATORY (INHALATION) at 11:57

## 2023-01-01 RX ADMIN — GABAPENTIN 200 MG: 100 CAPSULE ORAL at 20:31

## 2023-01-01 RX ADMIN — IOHEXOL 75 ML: 350 INJECTION, SOLUTION INTRAVENOUS at 02:10

## 2023-01-01 RX ADMIN — FERROUS SULFATE TAB 325 MG (65 MG ELEMENTAL FE) 65 MG OF IRON: 325 (65 FE) TAB at 09:33

## 2023-01-01 RX ADMIN — BENZONATATE 100 MG: 100 CAPSULE ORAL at 00:52

## 2023-01-01 RX ADMIN — BENZONATATE 100 MG: 100 CAPSULE ORAL at 06:29

## 2023-01-01 RX ADMIN — ALBUTEROL SULFATE 2.5 MG: 2.5 SOLUTION RESPIRATORY (INHALATION) at 08:11

## 2023-01-01 RX ADMIN — KETOTIFEN FUMARATE 1 DROP: 0.25 SOLUTION/ DROPS OPHTHALMIC at 22:33

## 2023-01-01 RX ADMIN — GABAPENTIN 200 MG: 100 CAPSULE ORAL at 18:05

## 2023-01-01 RX ADMIN — KETOTIFEN FUMARATE 1 DROP: 0.25 SOLUTION/ DROPS OPHTHALMIC at 20:34

## 2023-01-01 RX ADMIN — MONTELUKAST 10 MG: 10 TABLET, FILM COATED ORAL at 10:21

## 2023-01-01 RX ADMIN — FUROSEMIDE 40 MG: 10 INJECTION, SOLUTION INTRAMUSCULAR; INTRAVENOUS at 11:30

## 2023-01-01 RX ADMIN — IPRATROPIUM BROMIDE AND ALBUTEROL SULFATE 3 ML: 2.5; .5 SOLUTION RESPIRATORY (INHALATION) at 08:11

## 2023-01-01 RX ADMIN — ACETAMINOPHEN 650 MG: 325 TABLET ORAL at 10:01

## 2023-01-01 RX ADMIN — GABAPENTIN 200 MG: 100 CAPSULE ORAL at 20:43

## 2023-01-01 RX ADMIN — Medication 4.5 MG: at 22:31

## 2023-01-01 RX ADMIN — OXYCODONE HYDROCHLORIDE AND ACETAMINOPHEN 1 TABLET: 5; 325 TABLET ORAL at 10:56

## 2023-01-01 RX ADMIN — SENNOSIDES 8.6 MG: 8.6 TABLET, FILM COATED ORAL at 20:50

## 2023-01-01 RX ADMIN — Medication: at 09:00

## 2023-01-01 RX ADMIN — FUROSEMIDE 40 MG: 10 INJECTION, SOLUTION INTRAMUSCULAR; INTRAVENOUS at 10:29

## 2023-01-01 RX ADMIN — ENOXAPARIN SODIUM 40 MG: 40 INJECTION SUBCUTANEOUS at 09:38

## 2023-01-01 RX ADMIN — DULOXETINE HYDROCHLORIDE 30 MG: 30 CAPSULE, DELAYED RELEASE ORAL at 09:11

## 2023-01-01 RX ADMIN — GUAIFENESIN 600 MG: 600 TABLET, EXTENDED RELEASE ORAL at 10:19

## 2023-01-01 RX ADMIN — ATORVASTATIN CALCIUM 40 MG: 40 TABLET, FILM COATED ORAL at 21:11

## 2023-01-01 RX ADMIN — Medication 6 L/MIN: at 08:02

## 2023-01-01 RX ADMIN — DULOXETINE HYDROCHLORIDE 30 MG: 30 CAPSULE, DELAYED RELEASE ORAL at 10:02

## 2023-01-01 RX ADMIN — IRON SUCROSE 200 MG: 20 INJECTION, SOLUTION INTRAVENOUS at 09:58

## 2023-01-01 RX ADMIN — SENNOSIDES AND DOCUSATE SODIUM 2 TABLET: 50; 8.6 TABLET ORAL at 21:11

## 2023-01-01 RX ADMIN — SENNOSIDES 8.6 MG: 8.6 TABLET, FILM COATED ORAL at 22:30

## 2023-01-01 RX ADMIN — FERROUS SULFATE TAB 325 MG (65 MG ELEMENTAL FE) 1 TABLET: 325 (65 FE) TAB at 09:19

## 2023-01-01 RX ADMIN — MORPHINE SULFATE 2 MG: 2 INJECTION, SOLUTION INTRAMUSCULAR; INTRAVENOUS at 09:22

## 2023-01-01 RX ADMIN — METOPROLOL SUCCINATE 12.5 MG: 25 TABLET, EXTENDED RELEASE ORAL at 09:07

## 2023-01-01 RX ADMIN — DULOXETINE HYDROCHLORIDE 30 MG: 30 CAPSULE, DELAYED RELEASE ORAL at 09:38

## 2023-01-01 RX ADMIN — ATORVASTATIN CALCIUM 40 MG: 40 TABLET, FILM COATED ORAL at 21:30

## 2023-01-01 RX ADMIN — Medication 1 CAPSULE: at 08:36

## 2023-01-01 RX ADMIN — HEPARIN SODIUM 5000 UNITS: 5000 INJECTION INTRAVENOUS; SUBCUTANEOUS at 06:00

## 2023-01-01 RX ADMIN — ATORVASTATIN CALCIUM 40 MG: 40 TABLET, FILM COATED ORAL at 21:08

## 2023-01-01 RX ADMIN — GUAIFENESIN 600 MG: 600 TABLET, EXTENDED RELEASE ORAL at 10:01

## 2023-01-01 RX ADMIN — PREDNISONE 40 MG: 20 TABLET ORAL at 14:10

## 2023-01-01 RX ADMIN — IPRATROPIUM BROMIDE AND ALBUTEROL SULFATE 3 ML: 2.5; .5 SOLUTION RESPIRATORY (INHALATION) at 19:15

## 2023-01-01 RX ADMIN — SILDENAFIL 20 MG: 20 TABLET ORAL at 20:47

## 2023-01-01 RX ADMIN — Medication 10 ML: at 13:59

## 2023-01-01 RX ADMIN — ASPIRIN 81 MG CHEWABLE TABLET 81 MG: 81 TABLET CHEWABLE at 10:11

## 2023-01-01 RX ADMIN — OXYCODONE HYDROCHLORIDE 5 MG: 5 TABLET ORAL at 13:42

## 2023-01-01 RX ADMIN — KETOTIFEN FUMARATE 1 DROP: 0.25 SOLUTION/ DROPS OPHTHALMIC at 13:15

## 2023-01-01 RX ADMIN — SILDENAFIL 20 MG: 20 TABLET ORAL at 10:01

## 2023-01-01 RX ADMIN — Medication 50 MCG: at 20:44

## 2023-01-01 RX ADMIN — LEVOFLOXACIN 750 MG: 750 INJECTION, SOLUTION INTRAVENOUS at 15:30

## 2023-01-01 RX ADMIN — ACETAMINOPHEN 650 MG: 325 TABLET ORAL at 10:20

## 2023-01-01 RX ADMIN — HYDROMORPHONE HYDROCHLORIDE 0.5 MG: 1 INJECTION, SOLUTION INTRAMUSCULAR; INTRAVENOUS; SUBCUTANEOUS at 04:20

## 2023-01-01 RX ADMIN — OXYCODONE HYDROCHLORIDE 5 MG: 5 TABLET ORAL at 10:38

## 2023-01-01 RX ADMIN — Medication 1 CAPSULE: at 10:20

## 2023-01-01 RX ADMIN — OXYCODONE HYDROCHLORIDE 5 MG: 5 TABLET ORAL at 17:20

## 2023-01-01 RX ADMIN — Medication 4.5 MG: at 21:30

## 2023-01-01 RX ADMIN — METOPROLOL SUCCINATE 12.5 MG: 25 TABLET, EXTENDED RELEASE ORAL at 10:02

## 2023-01-01 RX ADMIN — MAGNESIUM SULFATE HEPTAHYDRATE 2 G: 40 INJECTION, SOLUTION INTRAVENOUS at 09:58

## 2023-01-01 RX ADMIN — OXYCODONE HYDROCHLORIDE AND ACETAMINOPHEN 1 TABLET: 5; 325 TABLET ORAL at 20:31

## 2023-01-01 RX ADMIN — SILDENAFIL 20 MG: 20 TABLET ORAL at 08:40

## 2023-01-01 RX ADMIN — Medication 4.5 MG: at 20:50

## 2023-01-01 RX ADMIN — KETOTIFEN FUMARATE 1 DROP: 0.25 SOLUTION/ DROPS OPHTHALMIC at 20:48

## 2023-01-01 RX ADMIN — ACETAMINOPHEN 650 MG: 325 TABLET ORAL at 18:05

## 2023-01-01 RX ADMIN — PANTOPRAZOLE SODIUM 40 MG: 40 TABLET, DELAYED RELEASE ORAL at 06:30

## 2023-01-01 RX ADMIN — GADOTERATE MEGLUMINE 9 ML: 376.9 INJECTION INTRAVENOUS at 14:14

## 2023-01-01 RX ADMIN — KETOTIFEN FUMARATE 1 DROP: 0.25 SOLUTION/ DROPS OPHTHALMIC at 21:00

## 2023-01-01 RX ADMIN — GUAIFENESIN 600 MG: 600 TABLET, EXTENDED RELEASE ORAL at 20:50

## 2023-01-01 RX ADMIN — SILDENAFIL 20 MG: 20 TABLET ORAL at 10:56

## 2023-01-01 RX ADMIN — Medication 50 MCG: at 21:03

## 2023-01-01 RX ADMIN — AZITHROMYCIN 500 MG: 500 TABLET, FILM COATED ORAL at 10:56

## 2023-01-01 RX ADMIN — OXYCODONE HYDROCHLORIDE AND ACETAMINOPHEN 1 TABLET: 5; 325 TABLET ORAL at 09:56

## 2023-01-01 RX ADMIN — Medication 5 MG: at 21:11

## 2023-01-01 RX ADMIN — VANCOMYCIN HYDROCHLORIDE 1000 MG: 1 INJECTION, SOLUTION INTRAVENOUS at 01:09

## 2023-01-01 RX ADMIN — SILDENAFIL 20 MG: 20 TABLET ORAL at 09:38

## 2023-01-01 RX ADMIN — KETOTIFEN FUMARATE 1 DROP: 0.25 SOLUTION/ DROPS OPHTHALMIC at 13:56

## 2023-01-01 RX ADMIN — OXYCODONE HYDROCHLORIDE AND ACETAMINOPHEN 1 TABLET: 5; 325 TABLET ORAL at 06:25

## 2023-01-01 RX ADMIN — LEVOFLOXACIN 750 MG: 750 INJECTION, SOLUTION INTRAVENOUS at 14:15

## 2023-01-01 RX ADMIN — GABAPENTIN 200 MG: 100 CAPSULE ORAL at 21:46

## 2023-01-01 RX ADMIN — KETOTIFEN FUMARATE 1 DROP: 0.25 SOLUTION/ DROPS OPHTHALMIC at 20:56

## 2023-01-01 RX ADMIN — ACETAMINOPHEN 650 MG: 325 TABLET ORAL at 11:08

## 2023-01-01 RX ADMIN — MORPHINE SULFATE 2 MG: 2 INJECTION, SOLUTION INTRAMUSCULAR; INTRAVENOUS at 16:11

## 2023-01-01 RX ADMIN — PANTOPRAZOLE SODIUM 40 MG: 40 TABLET, DELAYED RELEASE ORAL at 06:37

## 2023-01-01 RX ADMIN — BUDESONIDE INHALATION 0.5 MG: 0.5 SUSPENSION RESPIRATORY (INHALATION) at 08:12

## 2023-01-01 RX ADMIN — OXYCODONE HYDROCHLORIDE 5 MG: 5 TABLET ORAL at 03:04

## 2023-01-01 RX ADMIN — OXYCODONE HYDROCHLORIDE 10 MG: 5 TABLET ORAL at 01:56

## 2023-01-01 RX ADMIN — OXYCODONE HYDROCHLORIDE 5 MG: 5 TABLET ORAL at 09:19

## 2023-01-01 RX ADMIN — METHYLPREDNISOLONE SODIUM SUCCINATE 40 MG: 40 INJECTION, POWDER, FOR SOLUTION INTRAMUSCULAR; INTRAVENOUS at 20:56

## 2023-01-01 RX ADMIN — METOPROLOL SUCCINATE 12.5 MG: 25 TABLET, EXTENDED RELEASE ORAL at 13:56

## 2023-01-01 RX ADMIN — IOHEXOL 75 ML: 350 INJECTION, SOLUTION INTRAVENOUS at 18:04

## 2023-01-01 RX ADMIN — METHYLPREDNISOLONE SODIUM SUCCINATE 40 MG: 40 INJECTION, POWDER, FOR SOLUTION INTRAMUSCULAR; INTRAVENOUS at 09:27

## 2023-01-01 RX ADMIN — OXYCODONE HYDROCHLORIDE AND ACETAMINOPHEN 1 TABLET: 5; 325 TABLET ORAL at 22:13

## 2023-01-01 RX ADMIN — ALBUTEROL SULFATE 2.5 MG: 2.5 SOLUTION RESPIRATORY (INHALATION) at 19:03

## 2023-01-01 RX ADMIN — OXYCODONE HYDROCHLORIDE AND ACETAMINOPHEN 1 TABLET: 5; 325 TABLET ORAL at 11:19

## 2023-01-01 RX ADMIN — PREDNISONE 40 MG: 20 TABLET ORAL at 08:34

## 2023-01-01 RX ADMIN — ALBUTEROL SULFATE 2.5 MG: 2.5 SOLUTION RESPIRATORY (INHALATION) at 13:58

## 2023-01-01 RX ADMIN — SENNOSIDES 8.6 MG: 8.6 TABLET, FILM COATED ORAL at 10:19

## 2023-01-01 RX ADMIN — FORMOTEROL FUMARATE DIHYDRATE 20 MCG: 20 SOLUTION RESPIRATORY (INHALATION) at 19:28

## 2023-01-01 RX ADMIN — HEPARIN SODIUM 1200 UNITS/HR: 10000 INJECTION, SOLUTION INTRAVENOUS at 01:20

## 2023-01-01 RX ADMIN — MONTELUKAST 10 MG: 10 TABLET, FILM COATED ORAL at 08:36

## 2023-01-01 RX ADMIN — MORPHINE SULFATE 2 MG: 2 INJECTION, SOLUTION INTRAMUSCULAR; INTRAVENOUS at 11:09

## 2023-01-01 RX ADMIN — GABAPENTIN 200 MG: 100 CAPSULE ORAL at 09:06

## 2023-01-01 RX ADMIN — MONTELUKAST 10 MG: 10 TABLET, FILM COATED ORAL at 09:06

## 2023-01-01 RX ADMIN — SENNOSIDES 8.6 MG: 8.6 TABLET, FILM COATED ORAL at 20:47

## 2023-01-01 RX ADMIN — SODIUM CHLORIDE 250 ML: 9 INJECTION, SOLUTION INTRAVENOUS at 22:33

## 2023-01-01 RX ADMIN — GABAPENTIN 200 MG: 100 CAPSULE ORAL at 09:33

## 2023-01-01 RX ADMIN — GABAPENTIN 200 MG: 100 CAPSULE ORAL at 20:50

## 2023-01-01 RX ADMIN — SILDENAFIL 20 MG: 20 TABLET ORAL at 20:50

## 2023-01-01 RX ADMIN — ASPIRIN 81 MG CHEWABLE TABLET 81 MG: 81 TABLET CHEWABLE at 09:07

## 2023-01-01 RX ADMIN — SILDENAFIL 20 MG: 20 TABLET ORAL at 14:57

## 2023-01-01 RX ADMIN — DICLOFENAC SODIUM 1 APPLICATION: 10 GEL TOPICAL at 18:09

## 2023-01-01 RX ADMIN — OXYCODONE HYDROCHLORIDE AND ACETAMINOPHEN 1 TABLET: 5; 325 TABLET ORAL at 04:09

## 2023-01-01 RX ADMIN — POLYETHYLENE GLYCOL 3350 17 G: 17 POWDER, FOR SOLUTION ORAL at 09:50

## 2023-01-01 RX ADMIN — ASPIRIN 81 MG CHEWABLE TABLET 81 MG: 81 TABLET CHEWABLE at 09:11

## 2023-01-01 RX ADMIN — FERROUS SULFATE TAB 325 MG (65 MG ELEMENTAL FE) 65 MG OF IRON: 325 (65 FE) TAB at 09:51

## 2023-01-01 RX ADMIN — BUDESONIDE INHALATION 0.5 MG: 0.5 SUSPENSION RESPIRATORY (INHALATION) at 19:03

## 2023-01-01 RX ADMIN — ATORVASTATIN CALCIUM 40 MG: 40 TABLET, FILM COATED ORAL at 20:31

## 2023-01-01 RX ADMIN — SENNOSIDES 8.6 MG: 8.6 TABLET, FILM COATED ORAL at 21:03

## 2023-01-01 RX ADMIN — GABAPENTIN 200 MG: 100 CAPSULE ORAL at 08:46

## 2023-01-01 RX ADMIN — CEFTRIAXONE 2 G: 2 INJECTION, POWDER, FOR SOLUTION INTRAMUSCULAR; INTRAVENOUS at 14:10

## 2023-01-01 RX ADMIN — POTASSIUM CHLORIDE 40 MEQ: 1500 TABLET, EXTENDED RELEASE ORAL at 11:08

## 2023-01-01 RX ADMIN — METOPROLOL SUCCINATE 12.5 MG: 25 TABLET, EXTENDED RELEASE ORAL at 16:31

## 2023-01-01 RX ADMIN — PIPERACILLIN SODIUM AND TAZOBACTAM SODIUM 3.38 G: 3; .375 INJECTION, SOLUTION INTRAVENOUS at 10:12

## 2023-01-01 RX ADMIN — FORMOTEROL FUMARATE DIHYDRATE 20 MCG: 20 SOLUTION RESPIRATORY (INHALATION) at 19:03

## 2023-01-01 RX ADMIN — Medication 50 MCG: at 21:30

## 2023-01-01 RX ADMIN — OXYCODONE HYDROCHLORIDE 5 MG: 5 TABLET ORAL at 13:01

## 2023-01-01 RX ADMIN — PREDNISONE 40 MG: 20 TABLET ORAL at 08:46

## 2023-01-01 RX ADMIN — MIDAZOLAM HYDROCHLORIDE 0.5 MG: 1 INJECTION INTRAMUSCULAR; INTRAVENOUS at 14:00

## 2023-01-01 RX ADMIN — SENNOSIDES AND DOCUSATE SODIUM 2 TABLET: 50; 8.6 TABLET ORAL at 21:46

## 2023-01-01 RX ADMIN — GABAPENTIN 200 MG: 100 CAPSULE ORAL at 21:03

## 2023-01-01 RX ADMIN — DULOXETINE HYDROCHLORIDE 30 MG: 30 CAPSULE, DELAYED RELEASE ORAL at 08:36

## 2023-01-01 RX ADMIN — OXYCODONE HYDROCHLORIDE 5 MG: 5 TABLET ORAL at 13:50

## 2023-01-01 RX ADMIN — ASPIRIN 81 MG CHEWABLE TABLET 81 MG: 81 TABLET CHEWABLE at 10:02

## 2023-01-01 RX ADMIN — OXYCODONE HYDROCHLORIDE AND ACETAMINOPHEN 1 TABLET: 5; 325 TABLET ORAL at 20:47

## 2023-01-01 RX ADMIN — LOSARTAN POTASSIUM 50 MG: 50 TABLET, FILM COATED ORAL at 16:32

## 2023-01-01 RX ADMIN — ACETAMINOPHEN 650 MG: 325 TABLET ORAL at 15:41

## 2023-01-01 RX ADMIN — GUAIFENESIN 600 MG: 600 TABLET, EXTENDED RELEASE ORAL at 11:08

## 2023-01-01 RX ADMIN — ALBUTEROL SULFATE 2.5 MG: 2.5 SOLUTION RESPIRATORY (INHALATION) at 14:08

## 2023-01-01 RX ADMIN — OXYCODONE HYDROCHLORIDE 10 MG: 5 TABLET ORAL at 12:47

## 2023-01-01 RX ADMIN — OXYCODONE HYDROCHLORIDE 5 MG: 5 TABLET ORAL at 21:22

## 2023-01-01 RX ADMIN — HYDROMORPHONE HYDROCHLORIDE 0.5 MG: 1 INJECTION, SOLUTION INTRAMUSCULAR; INTRAVENOUS; SUBCUTANEOUS at 12:17

## 2023-01-01 RX ADMIN — Medication 50 MCG: at 09:52

## 2023-01-01 RX ADMIN — OXYCODONE HYDROCHLORIDE 10 MG: 5 TABLET ORAL at 10:25

## 2023-01-01 RX ADMIN — HEPARIN SODIUM 3000 UNITS: 5000 INJECTION, SOLUTION INTRAVENOUS; SUBCUTANEOUS at 07:15

## 2023-01-01 RX ADMIN — KETOTIFEN FUMARATE 1 DROP: 0.25 SOLUTION/ DROPS OPHTHALMIC at 09:11

## 2023-01-01 RX ADMIN — PIPERACILLIN SODIUM AND TAZOBACTAM SODIUM 3.38 G: 3; .375 INJECTION, SOLUTION INTRAVENOUS at 04:22

## 2023-01-01 RX ADMIN — OXYCODONE HYDROCHLORIDE AND ACETAMINOPHEN 1 TABLET: 5; 325 TABLET ORAL at 18:04

## 2023-01-01 RX ADMIN — MONTELUKAST 10 MG: 10 TABLET, FILM COATED ORAL at 10:01

## 2023-01-01 RX ADMIN — PIPERACILLIN SODIUM AND TAZOBACTAM SODIUM 3.38 G: 3; .375 INJECTION, SOLUTION INTRAVENOUS at 22:33

## 2023-01-01 RX ADMIN — OXYCODONE HYDROCHLORIDE AND ACETAMINOPHEN 1 TABLET: 5; 325 TABLET ORAL at 20:43

## 2023-01-01 RX ADMIN — PANTOPRAZOLE SODIUM 40 MG: 40 TABLET, DELAYED RELEASE ORAL at 09:51

## 2023-01-01 RX ADMIN — ALBUTEROL SULFATE 2.5 MG: 2.5 SOLUTION RESPIRATORY (INHALATION) at 08:02

## 2023-01-01 RX ADMIN — SODIUM CHLORIDE 125 ML/HR: 9 INJECTION, SOLUTION INTRAVENOUS at 11:08

## 2023-01-01 RX ADMIN — Medication: at 04:00

## 2023-01-01 RX ADMIN — GABAPENTIN 200 MG: 100 CAPSULE ORAL at 21:11

## 2023-01-01 RX ADMIN — OXYCODONE HYDROCHLORIDE AND ACETAMINOPHEN 1 TABLET: 5; 325 TABLET ORAL at 20:06

## 2023-01-01 RX ADMIN — MORPHINE SULFATE: 10 INJECTION INTRAVENOUS at 18:14

## 2023-01-01 RX ADMIN — IPRATROPIUM BROMIDE AND ALBUTEROL SULFATE 3 ML: 2.5; .5 SOLUTION RESPIRATORY (INHALATION) at 20:47

## 2023-01-01 RX ADMIN — HEPARIN SODIUM 5000 UNITS: 5000 INJECTION INTRAVENOUS; SUBCUTANEOUS at 21:11

## 2023-01-01 RX ADMIN — OXYCODONE HYDROCHLORIDE 5 MG: 5 TABLET ORAL at 18:08

## 2023-01-01 RX ADMIN — SODIUM CHLORIDE 500 ML: 9 INJECTION, SOLUTION INTRAVENOUS at 00:00

## 2023-01-01 RX ADMIN — OXYCODONE HYDROCHLORIDE 5 MG: 5 TABLET ORAL at 01:22

## 2023-01-01 SDOH — SOCIAL STABILITY: SOCIAL INSECURITY: ARE YOU OR HAVE YOU BEEN THREATENED OR ABUSED PHYSICALLY, EMOTIONALLY, OR SEXUALLY BY ANYONE?: NO

## 2023-01-01 SDOH — SOCIAL STABILITY: SOCIAL INSECURITY: DO YOU FEEL UNSAFE GOING BACK TO THE PLACE WHERE YOU ARE LIVING?: NO

## 2023-01-01 SDOH — SOCIAL STABILITY: SOCIAL INSECURITY: ABUSE: ADULT

## 2023-01-01 SDOH — SOCIAL STABILITY: SOCIAL INSECURITY: ARE THERE ANY APPARENT SIGNS OF INJURIES/BEHAVIORS THAT COULD BE RELATED TO ABUSE/NEGLECT?: NO

## 2023-01-01 SDOH — SOCIAL STABILITY: SOCIAL INSECURITY: WERE YOU ABLE TO COMPLETE ALL THE BEHAVIORAL HEALTH SCREENINGS?: YES

## 2023-01-01 SDOH — SOCIAL STABILITY: SOCIAL INSECURITY: DOES ANYONE TRY TO KEEP YOU FROM HAVING/CONTACTING OTHER FRIENDS OR DOING THINGS OUTSIDE YOUR HOME?: NO

## 2023-01-01 SDOH — SOCIAL STABILITY: SOCIAL INSECURITY: POSSIBLE ABUSE REPORTED TO:: OTHER (COMMENT)

## 2023-01-01 SDOH — SOCIAL STABILITY: SOCIAL INSECURITY: HAVE YOU HAD THOUGHTS OF HARMING ANYONE ELSE?: NO

## 2023-01-01 SDOH — SOCIAL STABILITY: SOCIAL INSECURITY: HAS ANYONE EVER THREATENED TO HURT YOUR FAMILY OR YOUR PETS?: NO

## 2023-01-01 SDOH — SOCIAL STABILITY: SOCIAL INSECURITY: DO YOU FEEL ANYONE HAS EXPLOITED OR TAKEN ADVANTAGE OF YOU FINANCIALLY OR OF YOUR PERSONAL PROPERTY?: NO

## 2023-01-01 ASSESSMENT — COGNITIVE AND FUNCTIONAL STATUS - GENERAL
DAILY ACTIVITIY SCORE: 18
CLIMB 3 TO 5 STEPS WITH RAILING: A LOT
DRESSING REGULAR UPPER BODY CLOTHING: A LITTLE
DAILY ACTIVITIY SCORE: 18
HELP NEEDED FOR BATHING: TOTAL
MOVING FROM LYING ON BACK TO SITTING ON SIDE OF FLAT BED WITH BEDRAILS: A LOT
DRESSING REGULAR UPPER BODY CLOTHING: TOTAL
PERSONAL GROOMING: A LITTLE
MOVING TO AND FROM BED TO CHAIR: A LITTLE
EATING MEALS: A LITTLE
MOBILITY SCORE: 8
DAILY ACTIVITIY SCORE: 19
STANDING UP FROM CHAIR USING ARMS: TOTAL
STANDING UP FROM CHAIR USING ARMS: A LITTLE
DRESSING REGULAR LOWER BODY CLOTHING: A LITTLE
TOILETING: TOTAL
TURNING FROM BACK TO SIDE WHILE IN FLAT BAD: TOTAL
DAILY ACTIVITIY SCORE: 6
WALKING IN HOSPITAL ROOM: A LITTLE
MOVING TO AND FROM BED TO CHAIR: A LITTLE
TOILETING: A LITTLE
PERSONAL GROOMING: A LITTLE
PERSONAL GROOMING: TOTAL
DRESSING REGULAR UPPER BODY CLOTHING: A LITTLE
CLIMB 3 TO 5 STEPS WITH RAILING: A LITTLE
PERSONAL GROOMING: A LITTLE
TURNING FROM BACK TO SIDE WHILE IN FLAT BAD: A LOT
WALKING IN HOSPITAL ROOM: A LOT
MOVING TO AND FROM BED TO CHAIR: A LOT
STANDING UP FROM CHAIR USING ARMS: A LOT
TURNING FROM BACK TO SIDE WHILE IN FLAT BAD: A LITTLE
DAILY ACTIVITIY SCORE: 17
MOVING TO AND FROM BED TO CHAIR: A LOT
PATIENT BASELINE BEDBOUND: YES
TURNING FROM BACK TO SIDE WHILE IN FLAT BAD: A LITTLE
DRESSING REGULAR UPPER BODY CLOTHING: A LITTLE
DRESSING REGULAR LOWER BODY CLOTHING: A LITTLE
MOVING FROM LYING ON BACK TO SITTING ON SIDE OF FLAT BED WITH BEDRAILS: A LITTLE
MOBILITY SCORE: 6
WALKING IN HOSPITAL ROOM: TOTAL
CLIMB 3 TO 5 STEPS WITH RAILING: A LOT
TOILETING: A LITTLE
MOVING TO AND FROM BED TO CHAIR: A LOT
HELP NEEDED FOR BATHING: A LITTLE
WALKING IN HOSPITAL ROOM: A LOT
MOVING FROM LYING ON BACK TO SITTING ON SIDE OF FLAT BED WITH BEDRAILS: A LITTLE
STANDING UP FROM CHAIR USING ARMS: A LOT
MOVING TO AND FROM BED TO CHAIR: A LITTLE
HELP NEEDED FOR BATHING: TOTAL
WALKING IN HOSPITAL ROOM: TOTAL
MOVING FROM LYING ON BACK TO SITTING ON SIDE OF FLAT BED WITH BEDRAILS: A LOT
TOILETING: A LITTLE
MOVING TO AND FROM BED TO CHAIR: A LOT
HELP NEEDED FOR BATHING: A LOT
TURNING FROM BACK TO SIDE WHILE IN FLAT BAD: A LOT
PATIENT BASELINE BEDBOUND: NO
WALKING IN HOSPITAL ROOM: A LOT
DRESSING REGULAR UPPER BODY CLOTHING: A LITTLE
HELP NEEDED FOR BATHING: A LOT
MOVING TO AND FROM BED TO CHAIR: A LITTLE
MOVING FROM LYING ON BACK TO SITTING ON SIDE OF FLAT BED WITH BEDRAILS: TOTAL
MOBILITY SCORE: 13
TURNING FROM BACK TO SIDE WHILE IN FLAT BAD: A LOT
EATING MEALS: A LITTLE
PERSONAL GROOMING: A LITTLE
WALKING IN HOSPITAL ROOM: A LOT
WALKING IN HOSPITAL ROOM: A LOT
HELP NEEDED FOR BATHING: A LOT
MOVING TO AND FROM BED TO CHAIR: A LOT
DAILY ACTIVITIY SCORE: 19
TURNING FROM BACK TO SIDE WHILE IN FLAT BAD: A LITTLE
PERSONAL GROOMING: A LITTLE
DRESSING REGULAR UPPER BODY CLOTHING: A LOT
DAILY ACTIVITIY SCORE: 15
DRESSING REGULAR UPPER BODY CLOTHING: A LITTLE
WALKING IN HOSPITAL ROOM: TOTAL
CLIMB 3 TO 5 STEPS WITH RAILING: TOTAL
MOVING FROM LYING ON BACK TO SITTING ON SIDE OF FLAT BED WITH BEDRAILS: A LITTLE
DRESSING REGULAR UPPER BODY CLOTHING: A LITTLE
CLIMB 3 TO 5 STEPS WITH RAILING: A LOT
CLIMB 3 TO 5 STEPS WITH RAILING: A LOT
TOILETING: A LITTLE
MOVING TO AND FROM BED TO CHAIR: A LOT
MOBILITY SCORE: 9
DAILY ACTIVITIY SCORE: 17
PERSONAL GROOMING: A LITTLE
MOBILITY SCORE: 17
PERSONAL GROOMING: A LOT
HELP NEEDED FOR BATHING: A LITTLE
PATIENT BASELINE BEDBOUND: UNABLE TO ASSESS AT THIS TIME
DAILY ACTIVITIY SCORE: 7
CLIMB 3 TO 5 STEPS WITH RAILING: TOTAL
DAILY ACTIVITIY SCORE: 12
DRESSING REGULAR UPPER BODY CLOTHING: A LITTLE
DRESSING REGULAR LOWER BODY CLOTHING: A LITTLE
WALKING IN HOSPITAL ROOM: TOTAL
TURNING FROM BACK TO SIDE WHILE IN FLAT BAD: A LITTLE
CLIMB 3 TO 5 STEPS WITH RAILING: A LOT
WALKING IN HOSPITAL ROOM: A LOT
CLIMB 3 TO 5 STEPS WITH RAILING: TOTAL
PERSONAL GROOMING: A LITTLE
PERSONAL GROOMING: A LOT
PERSONAL GROOMING: A LITTLE
MOVING TO AND FROM BED TO CHAIR: A LITTLE
HELP NEEDED FOR BATHING: TOTAL
MOBILITY SCORE: 6
DRESSING REGULAR LOWER BODY CLOTHING: A LITTLE
DAILY ACTIVITIY SCORE: 17
MOVING TO AND FROM BED TO CHAIR: A LOT
DRESSING REGULAR LOWER BODY CLOTHING: A LOT
EATING MEALS: A LITTLE
WALKING IN HOSPITAL ROOM: A LITTLE
MOBILITY SCORE: 13
DRESSING REGULAR LOWER BODY CLOTHING: A LOT
HELP NEEDED FOR BATHING: A LITTLE
MOBILITY SCORE: 11
STANDING UP FROM CHAIR USING ARMS: A LOT
DRESSING REGULAR UPPER BODY CLOTHING: A LITTLE
TURNING FROM BACK TO SIDE WHILE IN FLAT BAD: A LOT
EATING MEALS: A LOT
HELP NEEDED FOR BATHING: A LOT
CLIMB 3 TO 5 STEPS WITH RAILING: A LOT
TURNING FROM BACK TO SIDE WHILE IN FLAT BAD: A LITTLE
TURNING FROM BACK TO SIDE WHILE IN FLAT BAD: A LITTLE
PERSONAL GROOMING: TOTAL
MOVING FROM LYING ON BACK TO SITTING ON SIDE OF FLAT BED WITH BEDRAILS: TOTAL
DRESSING REGULAR LOWER BODY CLOTHING: A LOT
WALKING IN HOSPITAL ROOM: A LOT
TOILETING: TOTAL
STANDING UP FROM CHAIR USING ARMS: A LITTLE
MOBILITY SCORE: 17
TOILETING: TOTAL
TURNING FROM BACK TO SIDE WHILE IN FLAT BAD: A LOT
MOBILITY SCORE: 22
WALKING IN HOSPITAL ROOM: TOTAL
WALKING IN HOSPITAL ROOM: TOTAL
EATING MEALS: A LOT
PERSONAL GROOMING: A LITTLE
MOBILITY SCORE: 17
MOBILITY SCORE: 16
HELP NEEDED FOR BATHING: A LITTLE
STANDING UP FROM CHAIR USING ARMS: TOTAL
DRESSING REGULAR UPPER BODY CLOTHING: A LITTLE
MOBILITY SCORE: 11
CLIMB 3 TO 5 STEPS WITH RAILING: A LOT
HELP NEEDED FOR BATHING: A LOT
DAILY ACTIVITIY SCORE: 18
WALKING IN HOSPITAL ROOM: A LITTLE
MOVING FROM LYING ON BACK TO SITTING ON SIDE OF FLAT BED WITH BEDRAILS: A LITTLE
STANDING UP FROM CHAIR USING ARMS: A LITTLE
CLIMB 3 TO 5 STEPS WITH RAILING: A LOT
DAILY ACTIVITIY SCORE: 18
MOVING FROM LYING ON BACK TO SITTING ON SIDE OF FLAT BED WITH BEDRAILS: A LITTLE
CLIMB 3 TO 5 STEPS WITH RAILING: A LOT
STANDING UP FROM CHAIR USING ARMS: A LOT
TOILETING: A LOT
MOVING TO AND FROM BED TO CHAIR: TOTAL
STANDING UP FROM CHAIR USING ARMS: TOTAL
PERSONAL GROOMING: TOTAL
HELP NEEDED FOR BATHING: A LITTLE
HELP NEEDED FOR BATHING: A LOT
CLIMB 3 TO 5 STEPS WITH RAILING: A LOT
STANDING UP FROM CHAIR USING ARMS: A LITTLE
CLIMB 3 TO 5 STEPS WITH RAILING: TOTAL
DRESSING REGULAR UPPER BODY CLOTHING: A LITTLE
CLIMB 3 TO 5 STEPS WITH RAILING: A LOT
DRESSING REGULAR UPPER BODY CLOTHING: TOTAL
CLIMB 3 TO 5 STEPS WITH RAILING: TOTAL
DAILY ACTIVITIY SCORE: 8
DRESSING REGULAR UPPER BODY CLOTHING: TOTAL
TOILETING: A LITTLE
TURNING FROM BACK TO SIDE WHILE IN FLAT BAD: A LOT
TOILETING: A LOT
TURNING FROM BACK TO SIDE WHILE IN FLAT BAD: A LOT
TOILETING: A LOT
PERSONAL GROOMING: TOTAL
MOVING TO AND FROM BED TO CHAIR: A LITTLE
WALKING IN HOSPITAL ROOM: A LITTLE
CLIMB 3 TO 5 STEPS WITH RAILING: A LOT
MOBILITY SCORE: 13
TURNING FROM BACK TO SIDE WHILE IN FLAT BAD: A LOT
MOBILITY SCORE: 16
MOBILITY SCORE: 16
DRESSING REGULAR LOWER BODY CLOTHING: TOTAL
DRESSING REGULAR LOWER BODY CLOTHING: A LITTLE
EATING MEALS: TOTAL
MOBILITY SCORE: 15
STANDING UP FROM CHAIR USING ARMS: A LITTLE
PERSONAL GROOMING: A LITTLE
TOILETING: A LOT
DRESSING REGULAR LOWER BODY CLOTHING: A LITTLE
TOILETING: TOTAL
MOBILITY SCORE: 11
EATING MEALS: A LITTLE
TOILETING: A LOT
DAILY ACTIVITIY SCORE: 6
TURNING FROM BACK TO SIDE WHILE IN FLAT BAD: A LITTLE
DAILY ACTIVITIY SCORE: 17
TOILETING: A LOT
WALKING IN HOSPITAL ROOM: A LOT
CLIMB 3 TO 5 STEPS WITH RAILING: TOTAL
MOVING FROM LYING ON BACK TO SITTING ON SIDE OF FLAT BED WITH BEDRAILS: A LITTLE
DRESSING REGULAR UPPER BODY CLOTHING: A LOT
TOILETING: A LOT
DRESSING REGULAR UPPER BODY CLOTHING: A LOT
DRESSING REGULAR UPPER BODY CLOTHING: TOTAL
CLIMB 3 TO 5 STEPS WITH RAILING: A LOT
HELP NEEDED FOR BATHING: A LITTLE
MOVING TO AND FROM BED TO CHAIR: A LOT
CLIMB 3 TO 5 STEPS WITH RAILING: A LOT
WALKING IN HOSPITAL ROOM: A LITTLE
STANDING UP FROM CHAIR USING ARMS: A LOT
MOVING TO AND FROM BED TO CHAIR: A LITTLE
HELP NEEDED FOR BATHING: A LOT
DAILY ACTIVITIY SCORE: 13
DRESSING REGULAR UPPER BODY CLOTHING: A LITTLE
STANDING UP FROM CHAIR USING ARMS: A LOT
WALKING IN HOSPITAL ROOM: A LOT
DRESSING REGULAR LOWER BODY CLOTHING: A LITTLE
DRESSING REGULAR LOWER BODY CLOTHING: A LOT
TURNING FROM BACK TO SIDE WHILE IN FLAT BAD: A LITTLE
MOVING TO AND FROM BED TO CHAIR: A LITTLE
MOVING FROM LYING ON BACK TO SITTING ON SIDE OF FLAT BED WITH BEDRAILS: A LOT
STANDING UP FROM CHAIR USING ARMS: TOTAL
TURNING FROM BACK TO SIDE WHILE IN FLAT BAD: A LITTLE
MOBILITY SCORE: 16
MOBILITY SCORE: 11
MOVING FROM LYING ON BACK TO SITTING ON SIDE OF FLAT BED WITH BEDRAILS: A LITTLE
DAILY ACTIVITIY SCORE: 21
TOILETING: A LITTLE
STANDING UP FROM CHAIR USING ARMS: A LITTLE
MOVING FROM LYING ON BACK TO SITTING ON SIDE OF FLAT BED WITH BEDRAILS: A LITTLE
DRESSING REGULAR LOWER BODY CLOTHING: A LITTLE
EATING MEALS: A LITTLE
DRESSING REGULAR UPPER BODY CLOTHING: A LITTLE
MOVING FROM LYING ON BACK TO SITTING ON SIDE OF FLAT BED WITH BEDRAILS: A LITTLE
MOVING FROM LYING ON BACK TO SITTING ON SIDE OF FLAT BED WITH BEDRAILS: A LITTLE
TURNING FROM BACK TO SIDE WHILE IN FLAT BAD: A LOT
DRESSING REGULAR LOWER BODY CLOTHING: A LITTLE
MOVING FROM LYING ON BACK TO SITTING ON SIDE OF FLAT BED WITH BEDRAILS: A LOT
PERSONAL GROOMING: A LITTLE
HELP NEEDED FOR BATHING: A LITTLE
TOILETING: A LITTLE
HELP NEEDED FOR BATHING: A LOT
STANDING UP FROM CHAIR USING ARMS: A LOT
MOVING TO AND FROM BED TO CHAIR: TOTAL
DRESSING REGULAR UPPER BODY CLOTHING: A LITTLE
DAILY ACTIVITIY SCORE: 19
MOVING FROM LYING ON BACK TO SITTING ON SIDE OF FLAT BED WITH BEDRAILS: A LITTLE
MOVING TO AND FROM BED TO CHAIR: A LITTLE
MOVING FROM LYING ON BACK TO SITTING ON SIDE OF FLAT BED WITH BEDRAILS: A LOT
MOBILITY SCORE: 17
TOILETING: A LOT
DRESSING REGULAR LOWER BODY CLOTHING: A LITTLE
DRESSING REGULAR LOWER BODY CLOTHING: TOTAL
PERSONAL GROOMING: A LITTLE
DRESSING REGULAR LOWER BODY CLOTHING: TOTAL
MOVING FROM LYING ON BACK TO SITTING ON SIDE OF FLAT BED WITH BEDRAILS: A LITTLE
MOVING FROM LYING ON BACK TO SITTING ON SIDE OF FLAT BED WITH BEDRAILS: A LOT
WALKING IN HOSPITAL ROOM: A LOT
STANDING UP FROM CHAIR USING ARMS: A LITTLE
MOVING TO AND FROM BED TO CHAIR: TOTAL
HELP NEEDED FOR BATHING: TOTAL
DRESSING REGULAR LOWER BODY CLOTHING: A LITTLE
DRESSING REGULAR LOWER BODY CLOTHING: A LITTLE
STANDING UP FROM CHAIR USING ARMS: A LITTLE
STANDING UP FROM CHAIR USING ARMS: A LOT
EATING MEALS: TOTAL
DRESSING REGULAR LOWER BODY CLOTHING: TOTAL
TURNING FROM BACK TO SIDE WHILE IN FLAT BAD: TOTAL
DAILY ACTIVITIY SCORE: 19

## 2023-01-01 ASSESSMENT — PAIN SCALES - GENERAL
PAINLEVEL_OUTOF10: 6
PAINLEVEL_OUTOF10: 8
PAINLEVEL_OUTOF10: 9
PAINLEVEL_OUTOF10: 8
PAINLEVEL_OUTOF10: 8
PAINLEVEL_OUTOF10: 9
PAINLEVEL_OUTOF10: 0 - NO PAIN
PAINLEVEL_OUTOF10: 0 - NO PAIN
PAINLEVEL_OUTOF10: 10 - WORST POSSIBLE PAIN
PAINLEVEL_OUTOF10: 8
PAINLEVEL_OUTOF10: 8
PAINLEVEL_OUTOF10: 6
PAINLEVEL_OUTOF10: 8
PAINLEVEL_OUTOF10: 10 - WORST POSSIBLE PAIN
PAINLEVEL_OUTOF10: 0 - NO PAIN
PAINLEVEL_OUTOF10: 9
PAINLEVEL_OUTOF10: 8
PAINLEVEL_OUTOF10: 10 - WORST POSSIBLE PAIN
PAINLEVEL_OUTOF10: 10 - WORST POSSIBLE PAIN
PAINLEVEL_OUTOF10: 8
PAINLEVEL_OUTOF10: 9
PAINLEVEL_OUTOF10: 7
PAINLEVEL_OUTOF10: 8
PAINLEVEL_OUTOF10: 8
PAINLEVEL_OUTOF10: 0 - NO PAIN
PAINLEVEL_OUTOF10: 8
PAINLEVEL_OUTOF10: 0 - NO PAIN
PAINLEVEL_OUTOF10: 0 - NO PAIN
PAINLEVEL: 9
PAINLEVEL_OUTOF10: 8
PAINLEVEL_OUTOF10: 8
PAINLEVEL: 6
PAINLEVEL_OUTOF10: 0 - NO PAIN
PAINLEVEL_OUTOF10: 10 - WORST POSSIBLE PAIN
PAINLEVEL_OUTOF10: 8
PAINLEVEL_OUTOF10: 0 - NO PAIN
PAINLEVEL_OUTOF10: 0 - NO PAIN
PAINLEVEL_OUTOF10: 9
PAINLEVEL_OUTOF10: 9
PAINLEVEL_OUTOF10: 8
PAINLEVEL_OUTOF10: 7
PAINLEVEL_OUTOF10: 9
PAINLEVEL_OUTOF10: 6
PAINLEVEL_OUTOF10: 8
PAINLEVEL_OUTOF10: 8
PAINLEVEL_OUTOF10: 9
PAINLEVEL_OUTOF10: 8
PAINLEVEL: 9
PAINLEVEL_OUTOF10: 0 - NO PAIN
PAINLEVEL_OUTOF10: 9
PAINLEVEL_OUTOF10: 10 - WORST POSSIBLE PAIN
PAINLEVEL_OUTOF10: 8
PAINLEVEL_OUTOF10: 9
PAINLEVEL_OUTOF10: 5 - MODERATE PAIN
PAINLEVEL_OUTOF10: 7
PAINLEVEL_OUTOF10: 9
PAINLEVEL_OUTOF10: 9
PAINLEVEL_OUTOF10: 8
PAINLEVEL_OUTOF10: 6
PAINLEVEL_OUTOF10: 0 - NO PAIN
PAINLEVEL_OUTOF10: 9
PAINLEVEL_OUTOF10: 8
PAINLEVEL_OUTOF10: 5 - MODERATE PAIN
PAINLEVEL_OUTOF10: 6
PAINLEVEL_OUTOF10: 0 - NO PAIN
PAINLEVEL_OUTOF10: 8
PAINLEVEL_OUTOF10: 8
PAINLEVEL_OUTOF10: 10 - WORST POSSIBLE PAIN
PAINLEVEL_OUTOF10: 9
PAINLEVEL_OUTOF10: 4
PAINLEVEL_OUTOF10: 8
PAINLEVEL_OUTOF10: 10 - WORST POSSIBLE PAIN
PAINLEVEL_OUTOF10: 8
PAINLEVEL_OUTOF10: 10 - WORST POSSIBLE PAIN
PAINLEVEL_OUTOF10: 0 - NO PAIN
PAINLEVEL_OUTOF10: 8
PAINLEVEL_OUTOF10: 8
PAINLEVEL_OUTOF10: 10 - WORST POSSIBLE PAIN
PAINLEVEL_OUTOF10: 8
PAINLEVEL_OUTOF10: 5 - MODERATE PAIN
PAINLEVEL_OUTOF10: 8
PAINLEVEL_OUTOF10: 5 - MODERATE PAIN
PAINLEVEL_OUTOF10: 0 - NO PAIN
PAINLEVEL_OUTOF10: 9
PAINLEVEL_OUTOF10: 8
PAINLEVEL_OUTOF10: 9
PAINLEVEL_OUTOF10: 9
PAINLEVEL_OUTOF10: 6
PAINLEVEL_OUTOF10: 3
PAINLEVEL_OUTOF10: 9
PAINLEVEL_OUTOF10: 7
PAINLEVEL_OUTOF10: 7
PAINLEVEL_OUTOF10: 0 - NO PAIN
PAINLEVEL_OUTOF10: 8
PAINLEVEL_OUTOF10: 7
PAINLEVEL_OUTOF10: 9
PAINLEVEL_OUTOF10: 7
PAINLEVEL_OUTOF10: 9
PAINLEVEL_OUTOF10: 7
PAINLEVEL_OUTOF10: 0 - NO PAIN
PAINLEVEL_OUTOF10: 9
PAINLEVEL_OUTOF10: 5 - MODERATE PAIN
PAINLEVEL_OUTOF10: 8
PAINLEVEL_OUTOF10: 10 - WORST POSSIBLE PAIN
PAINLEVEL_OUTOF10: 6
PAINLEVEL_OUTOF10: 8
PAINLEVEL_OUTOF10: 9
PAINLEVEL_OUTOF10: 9
PAINLEVEL_OUTOF10: 10 - WORST POSSIBLE PAIN
PAINLEVEL_OUTOF10: 0 - NO PAIN

## 2023-01-01 ASSESSMENT — PAIN DESCRIPTION - ORIENTATION
ORIENTATION: RIGHT
ORIENTATION: RIGHT
ORIENTATION: RIGHT;LEFT
ORIENTATION: RIGHT
ORIENTATION: LEFT
ORIENTATION: RIGHT;LEFT
ORIENTATION: RIGHT

## 2023-01-01 ASSESSMENT — ACTIVITIES OF DAILY LIVING (ADL)
HEARING - RIGHT EAR: FUNCTIONAL
TOILETING: NEEDS ASSISTANCE
GROOMING: INDEPENDENT
ASSISTIVE_DEVICE: WALKER
WALKS IN HOME: NEEDS ASSISTANCE
HEARING - LEFT EAR: DIFFICULTY WITH NOISE
JUDGMENT_ADEQUATE_SAFELY_COMPLETE_DAILY_ACTIVITIES: YES
BATHING: NEEDS ASSISTANCE
GROOMING: NEEDS ASSISTANCE
JUDGMENT_ADEQUATE_SAFELY_COMPLETE_DAILY_ACTIVITIES: YES
DRESSING YOURSELF: NEEDS ASSISTANCE
PATIENT'S MEMORY ADEQUATE TO SAFELY COMPLETE DAILY ACTIVITIES?: YES
FEEDING YOURSELF: INDEPENDENT
TOILETING: NEEDS ASSISTANCE
DRESSING YOURSELF: NEEDS ASSISTANCE
PATIENT'S MEMORY ADEQUATE TO SAFELY COMPLETE DAILY ACTIVITIES?: YES
EFFECT OF PAIN ON DAILY ACTIVITIES: LIMITED ROM
GROOMING: INDEPENDENT
TOILETING: NEEDS ASSISTANCE
ASSISTIVE_DEVICE: WALKER;WHEELCHAIR
BATHING: INDEPENDENT
HOME_MANAGEMENT_TIME_ENTRY: 9
WALKS IN HOME: NEEDS ASSISTANCE
ASSISTIVE_DEVICE: OXYGEN;WALKER;WHEELCHAIR
HEARING - RIGHT EAR: FUNCTIONAL
ASSISTIVE_DEVICE: WHEELCHAIR
WALKS IN HOME: NEEDS ASSISTANCE
ADEQUATE_TO_COMPLETE_ADL: YES
LACK_OF_TRANSPORTATION: NO
HEARING - LEFT EAR: FUNCTIONAL
PATIENT'S MEMORY ADEQUATE TO SAFELY COMPLETE DAILY ACTIVITIES?: YES
LACK_OF_TRANSPORTATION: NO
JUDGMENT_ADEQUATE_SAFELY_COMPLETE_DAILY_ACTIVITIES: YES
DRESSING YOURSELF: INDEPENDENT
FEEDING YOURSELF: INDEPENDENT
LACK_OF_TRANSPORTATION: NO
JUDGMENT_ADEQUATE_SAFELY_COMPLETE_DAILY_ACTIVITIES: YES
FEEDING YOURSELF: INDEPENDENT
DRESSING YOURSELF: NEEDS ASSISTANCE
HEARING - RIGHT EAR: DIFFICULTY WITH NOISE
EFFECT OF PAIN ON DAILY ACTIVITIES: LIMITED ROM
ADEQUATE_TO_COMPLETE_ADL: YES
PATIENT'S MEMORY ADEQUATE TO SAFELY COMPLETE DAILY ACTIVITIES?: YES
HEARING - RIGHT EAR: FUNCTIONAL
FEEDING YOURSELF: NEEDS ASSISTANCE
HEARING - LEFT EAR: FUNCTIONAL
BATHING: NEEDS ASSISTANCE
WALKS IN HOME: NEEDS ASSISTANCE
ADEQUATE_TO_COMPLETE_ADL: YES
TOILETING: NEEDS ASSISTANCE
HEARING - LEFT EAR: FUNCTIONAL
GROOMING: INDEPENDENT
ADEQUATE_TO_COMPLETE_ADL: YES

## 2023-01-01 ASSESSMENT — ENCOUNTER SYMPTOMS
FATIGUE: 1
SEIZURES: 0
ADENOPATHY: 0
DEPRESSION: 1
UNEXPECTED WEIGHT CHANGE: 0
HEADACHES: 0
FATIGUE: 1
CHEST TIGHTNESS: 0
WEAKNESS: 1
VOMITING: 1
COUGH: 1
FATIGUE: 1
APPETITE CHANGE: 1
NAUSEA: 0
OCCASIONAL FEELINGS OF UNSTEADINESS: 0
SHORTNESS OF BREATH: 1
EYE PAIN: 0
CONSTIPATION: 0
COUGH: 1
UNEXPECTED WEIGHT CHANGE: 1
WHEEZING: 1
COUGH: 1
AGITATION: 0
SHORTNESS OF BREATH: 1
ABDOMINAL DISTENTION: 0
NAUSEA: 0
EYE PAIN: 0
EYE REDNESS: 0
ARTHRALGIAS: 0
CONSTIPATION: 0
DIZZINESS: 0
VOMITING: 1
UNEXPECTED WEIGHT CHANGE: 1
NAUSEA: 0
LEG SWELLING: 0
SHORTNESS OF BREATH: 0
ABDOMINAL PAIN: 1
WHEEZING: 0
APPETITE CHANGE: 0
HEADACHES: 0
APPETITE CHANGE: 1
BACK PAIN: 0
DYSURIA: 0
PALPITATIONS: 1
EYE PROBLEMS: 0
SHORTNESS OF BREATH: 1
DEPRESSION: 0
SORE THROAT: 0
ARTHRALGIAS: 1
WEAKNESS: 1
DIARRHEA: 0
NERVOUS/ANXIOUS: 1
CHEST TIGHTNESS: 0
NECK STIFFNESS: 1
LOSS OF SENSATION IN FEET: 0
DIFFICULTY URINATING: 0
TROUBLE SWALLOWING: 0
NUMBNESS: 0
DIFFICULTY URINATING: 0
FLANK PAIN: 1
APPETITE CHANGE: 1
COUGH: 1
SINUS PRESSURE: 0
NECK STIFFNESS: 0
COUGH: 1
WOUND: 0
ARTHRALGIAS: 0
ARTHRALGIAS: 1
COUGH: 1
FLANK PAIN: 0
EYE DISCHARGE: 0
EYE PROBLEMS: 0
DIZZINESS: 0
NAUSEA: 0
COUGH: 1
BACK PAIN: 0
HEADACHES: 0
HEMATURIA: 0
WEAKNESS: 0
ADENOPATHY: 0
ABDOMINAL PAIN: 0
FATIGUE: 1
WHEEZING: 0
SEIZURES: 0
LIGHT-HEADEDNESS: 1
DIFFICULTY URINATING: 0
CONSTIPATION: 0
LEG SWELLING: 0
CHEST TIGHTNESS: 1
CONSTIPATION: 0
VOMITING: 1
ABDOMINAL PAIN: 0
HEADACHES: 0
WHEEZING: 1
APPETITE CHANGE: 0
ARTHRALGIAS: 1
DYSPHORIC MOOD: 1
LOSS OF SENSATION IN FEET: 0
NERVOUS/ANXIOUS: 0
SHORTNESS OF BREATH: 1
NECK PAIN: 1
DIZZINESS: 0
BACK PAIN: 0
BACK PAIN: 0
FATIGUE: 1
SHORTNESS OF BREATH: 0
DIARRHEA: 0
DIZZINESS: 0
VOMITING: 0
DIARRHEA: 0
EYE PROBLEMS: 0
COUGH: 1
CONFUSION: 0
JOINT SWELLING: 0
ADENOPATHY: 0
LEG SWELLING: 0
OCCASIONAL FEELINGS OF UNSTEADINESS: 1
ABDOMINAL PAIN: 0
CHILLS: 0
ABDOMINAL PAIN: 0
WHEEZING: 1
ARTHRALGIAS: 0
EYE PROBLEMS: 0
NAUSEA: 0
UNEXPECTED WEIGHT CHANGE: 1
HEADACHES: 0
DIARRHEA: 0
LEG SWELLING: 0
DIZZINESS: 0
FEVER: 1
ABDOMINAL PAIN: 0
SHORTNESS OF BREATH: 0
DIARRHEA: 0
VOMITING: 0
CONFUSION: 1
RHINORRHEA: 1

## 2023-01-01 ASSESSMENT — PAIN - FUNCTIONAL ASSESSMENT
PAIN_FUNCTIONAL_ASSESSMENT: 0-10
PAIN_FUNCTIONAL_ASSESSMENT: WONG-BAKER FACES
PAIN_FUNCTIONAL_ASSESSMENT: PAINAD (PAIN ASSESSMENT IN ADVANCED DEMENTIA SCALE)
PAIN_FUNCTIONAL_ASSESSMENT: 0-10
PAIN_FUNCTIONAL_ASSESSMENT: PAINAD (PAIN ASSESSMENT IN ADVANCED DEMENTIA SCALE)
PAIN_FUNCTIONAL_ASSESSMENT: 0-10
PAIN_FUNCTIONAL_ASSESSMENT: PAINAD (PAIN ASSESSMENT IN ADVANCED DEMENTIA SCALE)
PAIN_FUNCTIONAL_ASSESSMENT: 0-10
PAIN_FUNCTIONAL_ASSESSMENT: PAINAD (PAIN ASSESSMENT IN ADVANCED DEMENTIA SCALE)
PAIN_FUNCTIONAL_ASSESSMENT: 0-10
PAIN_FUNCTIONAL_ASSESSMENT: PAINAD (PAIN ASSESSMENT IN ADVANCED DEMENTIA SCALE)
PAIN_FUNCTIONAL_ASSESSMENT: 0-10
PAIN_FUNCTIONAL_ASSESSMENT: UNABLE TO SELF-REPORT
PAIN_FUNCTIONAL_ASSESSMENT: 0-10

## 2023-01-01 ASSESSMENT — LIFESTYLE VARIABLES
AUDIT-C TOTAL SCORE: 0
HOW OFTEN DO YOU HAVE 6 OR MORE DRINKS ON ONE OCCASION: NEVER
PRESCIPTION_ABUSE_PAST_12_MONTHS: NO
AUDIT-C TOTAL SCORE: 0
SUBSTANCE_ABUSE_PAST_12_MONTHS: NO
SKIP TO QUESTIONS 9-10: 1
AUDIT-C TOTAL SCORE: 0
AUDIT-C TOTAL SCORE: 0
PRESCIPTION_ABUSE_PAST_12_MONTHS: NO
HOW OFTEN DO YOU HAVE 6 OR MORE DRINKS ON ONE OCCASION: NEVER
SUBSTANCE_ABUSE_PAST_12_MONTHS: NO
HOW OFTEN DO YOU HAVE A DRINK CONTAINING ALCOHOL: NEVER
SKIP TO QUESTIONS 9-10: 1
HOW OFTEN DO YOU HAVE 6 OR MORE DRINKS ON ONE OCCASION: NEVER
SKIP TO QUESTIONS 9-10: 1
HOW MANY STANDARD DRINKS CONTAINING ALCOHOL DO YOU HAVE ON A TYPICAL DAY: PATIENT DOES NOT DRINK
PRESCIPTION_ABUSE_PAST_12_MONTHS: NO
HOW MANY STANDARD DRINKS CONTAINING ALCOHOL DO YOU HAVE ON A TYPICAL DAY: PATIENT DOES NOT DRINK
PRESCIPTION_ABUSE_PAST_12_MONTHS: NO
SUBSTANCE_ABUSE_PAST_12_MONTHS: NO
SUBSTANCE_ABUSE_PAST_12_MONTHS: NO
AUDIT-C TOTAL SCORE: 0
AUDIT-C TOTAL SCORE: 1
HOW MANY STANDARD DRINKS CONTAINING ALCOHOL DO YOU HAVE ON A TYPICAL DAY: PATIENT DOES NOT DRINK
HOW OFTEN DO YOU HAVE A DRINK CONTAINING ALCOHOL: NEVER
HOW OFTEN DO YOU HAVE A DRINK CONTAINING ALCOHOL: MONTHLY OR LESS
HOW OFTEN DO YOU HAVE A DRINK CONTAINING ALCOHOL: NEVER
AUDIT-C TOTAL SCORE: 0
HOW OFTEN DO YOU HAVE 6 OR MORE DRINKS ON ONE OCCASION: NEVER
AUDIT-C TOTAL SCORE: 1
HOW MANY STANDARD DRINKS CONTAINING ALCOHOL DO YOU HAVE ON A TYPICAL DAY: PATIENT DOES NOT DRINK
SKIP TO QUESTIONS 9-10: 1

## 2023-01-01 ASSESSMENT — PATIENT HEALTH QUESTIONNAIRE - PHQ9
2. FEELING DOWN, DEPRESSED OR HOPELESS: SEVERAL DAYS
9. THOUGHTS THAT YOU WOULD BE BETTER OFF DEAD, OR OF HURTING YOURSELF: NOT AT ALL
3. TROUBLE FALLING OR STAYING ASLEEP OR SLEEPING TOO MUCH: 2
1. LITTLE INTEREST OR PLEASURE IN DOING THINGS: NOT AT ALL
2. FEELING DOWN, DEPRESSED OR HOPELESS: NOT AT ALL
10. IF YOU CHECKED OFF ANY PROBLEMS, HOW DIFFICULT HAVE THESE PROBLEMS MADE IT FOR YOU TO DO YOUR WORK, TAKE CARE OF THINGS AT HOME, OR GET ALONG WITH OTHER PEOPLE: NOT DIFFICULT AT ALL
9. THOUGHTS THAT YOU WOULD BE BETTER OFF DEAD, OR OF HURTING YOURSELF: 0
1. LITTLE INTEREST OR PLEASURE IN DOING THINGS: SEVERAL DAYS
2. FEELING DOWN, DEPRESSED OR HOPELESS: NOT AT ALL
2. FEELING DOWN, DEPRESSED, IRRITABLE, OR HOPELESS: MORE THAN HALF THE DAYS
5. POOR APPETITE OR OVEREATING: MORE THAN HALF THE DAYS
10. IF YOU CHECKED OFF ANY PROBLEMS, HOW DIFFICULT HAVE THESE PROBLEMS MADE IT FOR YOU TO DO YOUR WORK, TAKE CARE OF THINGS AT HOME, OR GET ALONG WITH OTHER PEOPLE: NOT DIFFICULT AT ALL
SUM OF ALL RESPONSES TO PHQ9 QUESTIONS 1 & 2: 0
7. TROUBLE CONCENTRATING ON THINGS, SUCH AS READING THE NEWSPAPER OR WATCHING TELEVISION: SEVERAL DAYS
SUM OF ALL RESPONSES TO PHQ9 QUESTIONS 1 & 2: 0
SUM OF ALL RESPONSES TO PHQ9 QUESTIONS 1 & 2: 1
SUM OF ALL RESPONSES TO PHQ9 QUESTIONS 1 AND 2: 2
2. FEELING DOWN, DEPRESSED OR HOPELESS: NOT AT ALL
1. LITTLE INTEREST OR PLEASURE IN DOING THINGS: NOT AT ALL
4. FEELING TIRED OR HAVING LITTLE ENERGY: MORE THAN HALF THE DAYS
1. LITTLE INTEREST OR PLEASURE IN DOING THINGS: SEVERAL DAYS
10. IF YOU CHECKED OFF ANY PROBLEMS, HOW DIFFICULT HAVE THESE PROBLEMS MADE IT FOR YOU TO DO YOUR WORK, TAKE CARE OF THINGS AT HOME, OR GET ALONG WITH OTHER PEOPLE: SOMEWHAT DIFFICULT
8. MOVING OR SPEAKING SO SLOWLY THAT OTHER PEOPLE COULD HAVE NOTICED. OR THE OPPOSITE, BEING SO FIGETY OR RESTLESS THAT YOU HAVE BEEN MOVING AROUND A LOT MORE THAN USUAL: 0
2. FEELING DOWN, DEPRESSED, IRRITABLE, OR HOPELESS: 2
SUM OF ALL RESPONSES TO PHQ9 QUESTIONS 1 & 2: 0
4. FEELING TIRED OR HAVING LITTLE ENERGY: MORE THAN HALF THE DAYS
4. FEELING TIRED OR HAVING LITTLE ENERGY: 2
6. FEELING BAD ABOUT YOURSELF - OR THAT YOU ARE A FAILURE OR HAVE LET YOURSELF OR YOUR FAMILY DOWN: SEVERAL DAYS
3. TROUBLE FALLING OR STAYING ASLEEP OR SLEEPING TOO MUCH: MORE THAN HALF THE DAYS
1. LITTLE INTEREST OR PLEASURE IN DOING THINGS: NOT AT ALL
9. THOUGHTS THAT YOU WOULD BE BETTER OFF DEAD, OR OF HURTING YOURSELF: NOT AT ALL
7. TROUBLE CONCENTRATING ON THINGS, SUCH AS READING THE NEWSPAPER OR WATCHING TELEVISION: 1
5. POOR APPETITE OR OVEREATING: 2
SUM OF ALL RESPONSES TO PHQ QUESTIONS 1-9: 10
1. LITTLE INTEREST OR PLEASURE IN DOING THINGS: 0
SUM OF ALL RESPONSES TO PHQ QUESTIONS 1-9: 10
3. TROUBLE FALLING OR STAYING ASLEEP OR SLEEPING TOO MUCH: MORE THAN HALF THE DAYS
2. FEELING DOWN, DEPRESSED OR HOPELESS: MORE THAN HALF THE DAYS
6. FEELING BAD ABOUT YOURSELF - OR THAT YOU ARE A FAILURE OR HAVE LET YOURSELF OR YOUR FAMILY DOWN: SEVERAL DAYS
5. POOR APPETITE OR OVEREATING: MORE THAN HALF THE DAYS
8. MOVING OR SPEAKING SO SLOWLY THAT OTHER PEOPLE COULD HAVE NOTICED. OR THE OPPOSITE, BEING SO FIGETY OR RESTLESS THAT YOU HAVE BEEN MOVING AROUND A LOT MORE THAN USUAL: NOT AT ALL
2. FEELING DOWN, DEPRESSED OR HOPELESS: NOT AT ALL
8. MOVING OR SPEAKING SO SLOWLY THAT OTHER PEOPLE COULD HAVE NOTICED. OR THE OPPOSITE, BEING SO FIGETY OR RESTLESS THAT YOU HAVE BEEN MOVING AROUND A LOT MORE THAN USUAL: NOT AT ALL
1. LITTLE INTEREST OR PLEASURE IN DOING THINGS: NOT AT ALL
1. LITTLE INTEREST OR PLEASURE IN DOING THINGS: NOT AT ALL
7. TROUBLE CONCENTRATING ON THINGS, SUCH AS READING THE NEWSPAPER OR WATCHING TELEVISION: SEVERAL DAYS
SUM OF ALL RESPONSES TO PHQ9 QUESTIONS 1 AND 2: 0
1. LITTLE INTEREST OR PLEASURE IN DOING THINGS: NOT AT ALL
2. FEELING DOWN, DEPRESSED OR HOPELESS: NOT AT ALL
6. FEELING BAD ABOUT YOURSELF - OR THAT YOU ARE A FAILURE OR HAVE LET YOURSELF OR YOUR FAMILY DOWN: 1

## 2023-01-01 ASSESSMENT — PAIN DESCRIPTION - FREQUENCY
FREQUENCY: CONSTANT/CONTINUOUS
FREQUENCY: SEVERAL DAYS A WEEK
FREQUENCY: CONSTANT/CONTINUOUS

## 2023-01-01 ASSESSMENT — PAIN DESCRIPTION - DESCRIPTORS
DESCRIPTORS: ACHING
DESCRIPTORS: SHARP
DESCRIPTORS: ACHING
DESCRIPTORS: ACHING;SHOOTING
DESCRIPTORS: ACHING;DISCOMFORT
DESCRIPTORS: ACHING;THROBBING;SQUEEZING
DESCRIPTORS: RADIATING;ACHING
DESCRIPTORS: ACHING;DISCOMFORT

## 2023-01-01 ASSESSMENT — COLUMBIA-SUICIDE SEVERITY RATING SCALE - C-SSRS
6. HAVE YOU EVER DONE ANYTHING, STARTED TO DO ANYTHING, OR PREPARED TO DO ANYTHING TO END YOUR LIFE?: NO
2. HAVE YOU ACTUALLY HAD ANY THOUGHTS OF KILLING YOURSELF?: NO
1. IN THE PAST MONTH, HAVE YOU WISHED YOU WERE DEAD OR WISHED YOU COULD GO TO SLEEP AND NOT WAKE UP?: NO
6. HAVE YOU EVER DONE ANYTHING, STARTED TO DO ANYTHING, OR PREPARED TO DO ANYTHING TO END YOUR LIFE?: NO
6. HAVE YOU EVER DONE ANYTHING, STARTED TO DO ANYTHING, OR PREPARED TO DO ANYTHING TO END YOUR LIFE?: NO
2. HAVE YOU ACTUALLY HAD ANY THOUGHTS OF KILLING YOURSELF?: NO
1. IN THE PAST MONTH, HAVE YOU WISHED YOU WERE DEAD OR WISHED YOU COULD GO TO SLEEP AND NOT WAKE UP?: NO
2. HAVE YOU ACTUALLY HAD ANY THOUGHTS OF KILLING YOURSELF?: NO
2. HAVE YOU ACTUALLY HAD ANY THOUGHTS OF KILLING YOURSELF?: NO
6. HAVE YOU EVER DONE ANYTHING, STARTED TO DO ANYTHING, OR PREPARED TO DO ANYTHING TO END YOUR LIFE?: NO
1. IN THE PAST MONTH, HAVE YOU WISHED YOU WERE DEAD OR WISHED YOU COULD GO TO SLEEP AND NOT WAKE UP?: NO
6. HAVE YOU EVER DONE ANYTHING, STARTED TO DO ANYTHING, OR PREPARED TO DO ANYTHING TO END YOUR LIFE?: NO
1. IN THE PAST MONTH, HAVE YOU WISHED YOU WERE DEAD OR WISHED YOU COULD GO TO SLEEP AND NOT WAKE UP?: NO
1. IN THE PAST MONTH, HAVE YOU WISHED YOU WERE DEAD OR WISHED YOU COULD GO TO SLEEP AND NOT WAKE UP?: NO
2. HAVE YOU ACTUALLY HAD ANY THOUGHTS OF KILLING YOURSELF?: NO
1. IN THE PAST MONTH, HAVE YOU WISHED YOU WERE DEAD OR WISHED YOU COULD GO TO SLEEP AND NOT WAKE UP?: NO
2. HAVE YOU ACTUALLY HAD ANY THOUGHTS OF KILLING YOURSELF?: NO

## 2023-01-01 ASSESSMENT — PAIN DESCRIPTION - LOCATION
LOCATION: HIP
LOCATION: SHOULDER
LOCATION: HIP

## 2023-01-01 ASSESSMENT — PAIN SCALES - PAIN ASSESSMENT IN ADVANCED DEMENTIA (PAINAD)
NEGVOCALIZATION: OCCASIONAL MOAN/GROAN, LOW SPEECH, NEGATIVE/DISAPPROVING QUALITY
BREATHING: OCCASIONAL LABORED BREATHING, SHORT PERIOD OF HYPERVENTILATION
CONSOLABILITY: DISTRACTED OR REASSURED BY VOICE/TOUCH
BODYLANGUAGE: RELAXED
TOTALSCORE: MEDICATION (SEE MAR)
BODYLANGUAGE: TENSE, DISTRESSED PACING, FIDGETING
NEGVOCALIZATION: OCCASIONAL MOAN/GROAN, LOW SPEECH, NEGATIVE/DISAPPROVING QUALITY
TOTALSCORE: 1
BREATHING: OCCASIONAL LABORED BREATHING, SHORT PERIOD OF HYPERVENTILATION
FACIALEXPRESSION: FACIAL GRIMACING
TOTALSCORE: MEDICATION (SEE MAR)
TOTALSCORE: 8
BODYLANGUAGE: TENSE, DISTRESSED PACING, FIDGETING
CONSOLABILITY: NO NEED TO CONSOLE
BREATHING: OCCASIONAL LABORED BREATHING, SHORT PERIOD OF HYPERVENTILATION
NEGVOCALIZATION: OCCASIONAL MOAN/GROAN, LOW SPEECH, NEGATIVE/DISAPPROVING QUALITY
TOTALSCORE: 4
BODYLANGUAGE: RIGID, FISTS CLENCHED, KNEES UP, PUSHING/PULLING AWAY, STRIKES OUT
FACIALEXPRESSION: SMILING OR INEXPRESSIVE
TOTALSCORE: MEDICATION (SEE MAR)
FACIALEXPRESSION: SMILING OR INEXPRESSIVE
TOTALSCORE: MEDICATION (SEE MAR)
BODYLANGUAGE: RIGID, FISTS CLENCHED, KNEES UP, PUSHING/PULLING AWAY, STRIKES OUT
TOTALSCORE: 4
NEGVOCALIZATION: REPEATED TROUBLED CALLING OUT, LOUD MOANING/GROANING, CRYING
TOTALSCORE: 6
CONSOLABILITY: DISTRACTED OR REASSURED BY VOICE/TOUCH
CONSOLABILITY: DISTRACTED OR REASSURED BY VOICE/TOUCH
FACIALEXPRESSION: SAD, FRIGHTENED, FROWN
FACIALEXPRESSION: SMILING OR INEXPRESSIVE
TOTALSCORE: MEDICATION (SEE MAR)
BODYLANGUAGE: TENSE, DISTRESSED PACING, FIDGETING
FACIALEXPRESSION: SMILING OR INEXPRESSIVE
CONSOLABILITY: DISTRACTED OR REASSURED BY VOICE/TOUCH
TOTALSCORE: 4
NEGVOCALIZATION: OCCASIONAL MOAN/GROAN, LOW SPEECH, NEGATIVE/DISAPPROVING QUALITY
CONSOLABILITY: DISTRACTED OR REASSURED BY VOICE/TOUCH
BREATHING: OCCASIONAL LABORED BREATHING, SHORT PERIOD OF HYPERVENTILATION

## 2023-01-01 ASSESSMENT — PAIN DESCRIPTION - PROGRESSION
CLINICAL_PROGRESSION: NOT CHANGED
CLINICAL_PROGRESSION: GRADUALLY IMPROVING
CLINICAL_PROGRESSION: NOT CHANGED

## 2023-01-01 ASSESSMENT — RESPIRATORY DISTRESS OBSERVATION SCALE (RDOS)
RESTLESS NONPURPOSEFUL MOVEMENTS: 1 - OCCASIONAL, SLIGHT MOVEMENTS
RESPIRATORY RATE PER MINUTE: 2 - >30 BREATHS
RESTLESS NONPURPOSEFUL MOVEMENTS: 1 - OCCASIONAL, SLIGHT MOVEMENTS
GRUNTING AT END OF EXPIRATION: 0 - NONE
HEART RATE PER MINUTE: 1 - 90-109 BEATS
RDOS TOTAL SCORE: 4
INVOLUNTARY NASAL FLARING: 0 - NONE
PARADOXICAL BREATHING PATTERN: 0 - NONE
RESPIRATORY RATE PER MINUTE: 2 - >30 BREATHS
INVOLUNTARY NASAL FLARING: 0 - NONE
GRUNTING AT END OF EXPIRATION: 0 - NONE
RESTLESS NONPURPOSEFUL MOVEMENTS: 1 - OCCASIONAL, SLIGHT MOVEMENTS
HEART RATE PER MINUTE: 1 - 90-109 BEATS
PARADOXICAL BREATHING PATTERN: 0 - NONE
ACCESSORY MUSCLE RISE IN CLAVICLE DURING INSPIRATION: 0 - NONE
ACCESSORY MUSCLE RISE IN CLAVICLE DURING INSPIRATION: 1 - SLIGHT RISE
GRUNTING AT END OF EXPIRATION: 0 - NONE
RESPIRATORY RATE PER MINUTE: 1 - 19-30 BREATHS
RDOS TOTAL SCORE: 3
LOOK OF FEAR: 0 - NONE
RDOS TOTAL SCORE: 5
INVOLUNTARY NASAL FLARING: 0 - NONE
LOOK OF FEAR: 0 - NONE
PARADOXICAL BREATHING PATTERN: 0 - NONE
LOOK OF FEAR: 0 - NONE
ACCESSORY MUSCLE RISE IN CLAVICLE DURING INSPIRATION: 0 - NONE
HEART RATE PER MINUTE: 1 - 90-109 BEATS

## 2023-01-01 ASSESSMENT — PAIN DESCRIPTION - PAIN TYPE
TYPE: ACUTE PAIN
TYPE: CHRONIC PAIN
TYPE: ACUTE PAIN

## 2023-01-01 ASSESSMENT — PAIN DESCRIPTION - ONSET
ONSET: ONGOING

## 2023-01-01 ASSESSMENT — PAIN SCALES - WONG BAKER: WONGBAKER_NUMERICALRESPONSE: HURTS WHOLE LOT

## 2023-03-06 PROBLEM — R06.2 WHEEZING: Status: ACTIVE | Noted: 2023-01-01

## 2023-03-06 PROBLEM — R05.9 COUGH: Status: ACTIVE | Noted: 2023-01-01

## 2023-03-06 PROBLEM — R53.1 WEAKNESS: Status: ACTIVE | Noted: 2023-01-01

## 2023-03-06 NOTE — PROGRESS NOTES
Subjective   Chief complaint: Norah Cleaning is a 82 y.o. female who is a long term resident. Patient presents for follow-up therapy.  HPI:  Patient presents for follow-up therapy.  Patient examined today at bedside.  Patient continues working in occupational therapy.  Patient complaining of cough, wheezing.  Order given to obtain a chest x-ray.  Nurse reports that x-ray will be out today.  Patient denies any other pain or discomfort.  No acute distress.        Review of Systems   Respiratory:  Positive for cough and wheezing.    Neurological:  Positive for weakness.     All systems reviewed and negative except for what was mentioned in the HPI    Vital signs: 133/54, 97.7, 73, 18    Objective   Physical Exam  Pulmonary:      Breath sounds: Wheezing present.         Assessment/Plan   Problem List Items Addressed This Visit       Cough     No respiratory distress noted.Obtain chest x-ray.         Weakness     Continue OT.         Wheezing     Continue DuoNebs.          Medications, treatments, and labs reviewed  Continue medications and treatments as listed in [facility]  Scribe Attestation  By signing my name below, Inamrata Scribe   attest that this documentation has been prepared under the direction and in the presence of Shyla Kan MD.    Provider Attestation - Scribe documentation  All medical record entries made by the Scribe were at my direction and personally dictated by me. I have reviewed the chart and agree that the record accurately reflects my personal performance of the history, physical exam, discussion and plan.

## 2023-03-06 NOTE — LETTER
Subjective  Chief complaint: Norah Cleaning is a 82 y.o. female who is a long term resident. Patient presents for follow-up therapy.  HPI:  Patient presents for follow-up therapy.  Patient examined today at bedside.  Patient continues working in occupational therapy.  Patient complaining of cough, wheezing.  Order given to obtain a chest x-ray.  Nurse reports that x-ray will be out today.  Patient denies any other pain or discomfort.  No acute distress.        Review of Systems   Respiratory:  Positive for cough and wheezing.    Neurological:  Positive for weakness.     All systems reviewed and negative except for what was mentioned in the HPI    Vital signs: 133/54, 97.7, 73, 18    Objective  Physical Exam  Pulmonary:      Breath sounds: Wheezing present.         Assessment/Plan  Problem List Items Addressed This Visit       Cough     No respiratory distress noted.Obtain chest x-ray.         Weakness     Continue OT.         Wheezing     Continue DuoNebs.          Medications, treatments, and labs reviewed  Continue medications and treatments as listed in [facility]  Scribe Attestation  By signing my name below, Inamrata Scribe   attest that this documentation has been prepared under the direction and in the presence of Shyla Kan MD.    Provider Attestation - Scribe documentation  All medical record entries made by the Scribe were at my direction and personally dictated by me. I have reviewed the chart and agree that the record accurately reflects my personal performance of the history, physical exam, discussion and plan.

## 2023-03-09 NOTE — LETTER
Subjective  Chief complaint: Norah Cleaning is a 82 y.o. female who is a long term resident patient being seen and evaluated for multiple medical problems.  Patient presents for weakness    HPI:  Hello 98 patient presents for general medical care and f/u.  Patient seen and examined at bedside.  No issues per nursing.  Patient has no acute complaints.  Denies chest pain and headache.  denies sob, wheezing, cough.  denies fatigue, sob, and palpitations.  No acute distress.        Review of Systems  All systems reviewed and negative except for what was mentioned in the HPI    Vital signs: 138/76, 97.8, 72, 18, 97%    Objective  Physical Exam  Constitutional:       General: She is not in acute distress.  Eyes:      Extraocular Movements: Extraocular movements intact.   Cardiovascular:      Rate and Rhythm: Regular rhythm.   Pulmonary:      Effort: Pulmonary effort is normal.      Breath sounds: Normal breath sounds.   Abdominal:      General: Bowel sounds are normal.      Palpations: Abdomen is soft.   Musculoskeletal:      Cervical back: Neck supple.      Right lower leg: No edema.      Left lower leg: No edema.   Neurological:      Mental Status: She is alert.   Psychiatric:         Mood and Affect: Mood normal.         Behavior: Behavior is cooperative.         Assessment/Plan  Problem List Items Addressed This Visit          Respiratory    COPD (chronic obstructive pulmonary disease) (CMS/HCC)     Stable            Circulatory    HTN (hypertension)     Monitor BP  Continue antihypertensives            Hematologic    Anemia     Stable          Medications, treatments, and labs reviewed  Continue medications and treatments as listed in [facility]    Scribe Attestation  By signing my name below, Natalya POLLOCK, [employer]   attest that this documentation has been prepared under the direction and in the presence of Shyla Kan MD.    Provider Attestation - Scribe documentation  All medical record entries made by the  Scribe were at my direction and personally dictated by me. I have reviewed the chart and agree that the record accurately reflects my personal performance of the history, physical exam, discussion and plan.

## 2023-03-14 PROBLEM — I10 HTN (HYPERTENSION): Status: ACTIVE | Noted: 2023-01-01

## 2023-03-14 PROBLEM — J44.9 COPD (CHRONIC OBSTRUCTIVE PULMONARY DISEASE) (MULTI): Status: ACTIVE | Noted: 2023-01-01

## 2023-03-14 PROBLEM — D64.9 ANEMIA: Status: ACTIVE | Noted: 2023-01-01

## 2023-03-17 NOTE — PROGRESS NOTES
Subjective   Chief complaint: Norah Cleaning is a 82 y.o. female who is a long term resident. Patient presents for follow-up therapy.  HPI:  Patient presents for follow-up therapy.  Patient examined today at bedside.  Patient continues working in occupational therapy.  Patient complaining of cough, wheezing.  Order given to obtain a chest x-ray.  Nurse reports that x-ray will be out today.  Patient denies any other pain or discomfort.  No acute distress.        Review of Systems   Respiratory:  Positive for cough and wheezing.    Neurological:  Positive for weakness.     All systems reviewed and negative except for what was mentioned in the HPI    Vital signs: 133/54, 97.7, 73, 18    Objective   Physical Exam  Pulmonary:      Breath sounds: Wheezing present.         Assessment/Plan   Problem List Items Addressed This Visit          Respiratory    Cough     No respiratory distress noted.Obtain chest x-ray.         Wheezing     Continue DuoNebs.            Other    Weakness     Continue OT.          Medications, treatments, and labs reviewed  Continue medications and treatments as listed in [facility]  Scribe Attestation  By signing my name below, Inamrata Scribe   attest that this documentation has been prepared under the direction and in the presence of Shyla Kan MD.    Provider Attestation - Scribe documentation  All medical record entries made by the Scribe were at my direction and personally dictated by me. I have reviewed the chart and agree that the record accurately reflects my personal performance of the history, physical exam, discussion and plan.

## 2023-03-20 PROBLEM — J40 BRONCHITIS: Status: ACTIVE | Noted: 2023-01-01

## 2023-03-20 NOTE — PROGRESS NOTES
Subjective   Chief complaint: Norah Cleaning is a 82 y.o. female who is a long term resident  Presents for f/u cough  HPI:  Patient presents for f/u cough.  Call received from facility stating that patient was requesting to see me in regards to persisting cough.  Patient examined today at bedside.  Patient complains of moist productive cough.  Patient denies any shortness of breath or wheezing.  Patient denies any chest discomfort, fever, or chills.  Patient has no other concerns.  No acute distress.        Review of Systems   Respiratory:  Positive for cough. Negative for chest tightness, shortness of breath and wheezing.      All systems reviewed and negative except for what was mentioned in the HPI    Vital signs: 121/59, 97.4, 72, 18    Objective   Physical Exam  Constitutional:       General: She is not in acute distress.  Eyes:      Extraocular Movements: Extraocular movements intact.   Cardiovascular:      Rate and Rhythm: Regular rhythm.   Pulmonary:      Effort: Pulmonary effort is normal. No respiratory distress.      Breath sounds: Normal breath sounds. No wheezing.   Chest:      Chest wall: No tenderness.   Abdominal:      General: Bowel sounds are normal.      Palpations: Abdomen is soft.   Musculoskeletal:      Cervical back: Neck supple.      Right lower leg: No edema.      Left lower leg: No edema.   Neurological:      Mental Status: She is alert.   Psychiatric:         Mood and Affect: Mood normal.         Behavior: Behavior is cooperative.         Assessment/Plan   Problem List Items Addressed This Visit       Bronchitis     No respiratory distress noted.  Start Mucinex, Phenergan, doxycycline, monitor.         Cough     No respiratory distress noted.  Start Mucinex, Phenergan, doxycycline, monitor.         HTN (hypertension)     Continue monitor blood pressure.  Continue antihypertensive meds.          Medications, treatments, and labs reviewed  Continue medications and treatments as listed  in [facility]    Scribe Attestation  I, Duane cox   attest that this documentation has been prepared under the direction and in the presence of Shyla Kan MD.    Provider Attestation - Scribe documentation  All medical record entries made by the Scribe were at my direction and personally dictated by me. I have reviewed the chart and agree that the record accurately reflects my personal performance of the history, physical exam, discussion and plan.

## 2023-03-20 NOTE — LETTER
Patient: Norah Cleaning  : 1940    Encounter Date: 2023    Subjective  Chief complaint: Norah Cleaning is a 82 y.o. female who is a long term resident  Presents for f/u cough  HPI:  Patient presents for f/u cough.  Call received from facility stating that patient was requesting to see me in regards to persisting cough.  Patient examined today at bedside.  Patient complains of moist productive cough.  Patient denies any shortness of breath or wheezing.  Patient denies any chest discomfort, fever, or chills.  Patient has no other concerns.  No acute distress.        Review of Systems   Respiratory:  Positive for cough. Negative for chest tightness, shortness of breath and wheezing.      All systems reviewed and negative except for what was mentioned in the HPI    Vital signs: 121/59, 97.4, 72, 18    Objective  Physical Exam  Constitutional:       General: She is not in acute distress.  Eyes:      Extraocular Movements: Extraocular movements intact.   Cardiovascular:      Rate and Rhythm: Regular rhythm.   Pulmonary:      Effort: Pulmonary effort is normal. No respiratory distress.      Breath sounds: Normal breath sounds. No wheezing.   Chest:      Chest wall: No tenderness.   Abdominal:      General: Bowel sounds are normal.      Palpations: Abdomen is soft.   Musculoskeletal:      Cervical back: Neck supple.      Right lower leg: No edema.      Left lower leg: No edema.   Neurological:      Mental Status: She is alert.   Psychiatric:         Mood and Affect: Mood normal.         Behavior: Behavior is cooperative.         Assessment/Plan  Problem List Items Addressed This Visit       Bronchitis     No respiratory distress noted.  Start Mucinex, Phenergan, doxycycline, monitor.         Cough     No respiratory distress noted.  Start Mucinex, Phenergan, doxycycline, monitor.         HTN (hypertension)     Continue monitor blood pressure.  Continue antihypertensive meds.          Medications,  treatments, and labs reviewed  Continue medications and treatments as listed in [facility]    Scribe Attestation  I, Isaac coxibfox   attest that this documentation has been prepared under the direction and in the presence of Shyla Kan MD.    Provider Attestation - Scribe documentation  All medical record entries made by the Scribe were at my direction and personally dictated by me. I have reviewed the chart and agree that the record accurately reflects my personal performance of the history, physical exam, discussion and plan.        Electronically Signed By: Shyla Kan MD   3/21/23  5:41 PM

## 2023-04-10 NOTE — LETTER
Patient: Norah Cleaning  : 1940    Encounter Date: 04/10/2023    Subjective  Chief complaint: Norah Cleaning is a 82 y.o. female who is a long term resident patient being seen and evaluated for multiple medical problems.  Patient presents for weakness    HPI:  Hello 98 patient presents for general medical care and f/u.  Patient seen and examined at bedside.  No issues per nursing.  Patient has no acute complaints.  Denies chest pain and headache.  denies sob, wheezing, cough.  denies fatigue, sob, and palpitations.  No acute distress.        Review of Systems  All systems reviewed and negative except for what was mentioned in the HPI    Vital signs: 138/76,  97%    Objective  Physical Exam  Constitutional:       General: She is not in acute distress.  Eyes:      Extraocular Movements: Extraocular movements intact.   Cardiovascular:      Rate and Rhythm: Regular rhythm.   Pulmonary:      Effort: Pulmonary effort is normal.      Breath sounds: Normal breath sounds.   Abdominal:      General: Bowel sounds are normal.      Palpations: Abdomen is soft.   Musculoskeletal:      Cervical back: Neck supple.      Right lower leg: No edema.      Left lower leg: No edema.   Neurological:      Mental Status: She is alert.   Psychiatric:         Mood and Affect: Mood normal.         Behavior: Behavior is cooperative.         Assessment/Plan  Problem List Items Addressed This Visit          Respiratory    COPD (chronic obstructive pulmonary disease) (CMS/HCC)     Stable            Circulatory    HTN (hypertension)     Continue monitor blood pressure.  Continue antihypertensive meds.            Hematologic    Anemia     Stable        Medications, treatments, and labs reviewed  Continue medications and treatments as listed in [facility]    Scribe Attestation  By signing my name below, Natalya POLLOCK, Scribe   attest that this documentation has been prepared under the direction and in the presence of Shyla Kan  MD.    Provider Attestation - Scribe documentation  All medical record entries made by the Scribe were at my direction and personally dictated by me. I have reviewed the chart and agree that the record accurately reflects my personal performance of the history, physical exam, discussion and plan.      Electronically Signed By: Shyla Kan MD   4/13/23 11:02 AM

## 2023-04-13 NOTE — PROGRESS NOTES
Subjective   Chief complaint: Norah Cleaning is a 82 y.o. female who is a long term resident patient being seen and evaluated for multiple medical problems.  Patient presents for weakness    HPI:  Hello 98 patient presents for general medical care and f/u.  Patient seen and examined at bedside.  No issues per nursing.  Patient has no acute complaints.  Denies chest pain and headache.  denies sob, wheezing, cough.  denies fatigue, sob, and palpitations.  No acute distress.        Review of Systems  All systems reviewed and negative except for what was mentioned in the HPI    Vital signs: 138/76,  97%    Objective   Physical Exam  Constitutional:       General: She is not in acute distress.  Eyes:      Extraocular Movements: Extraocular movements intact.   Cardiovascular:      Rate and Rhythm: Regular rhythm.   Pulmonary:      Effort: Pulmonary effort is normal.      Breath sounds: Normal breath sounds.   Abdominal:      General: Bowel sounds are normal.      Palpations: Abdomen is soft.   Musculoskeletal:      Cervical back: Neck supple.      Right lower leg: No edema.      Left lower leg: No edema.   Neurological:      Mental Status: She is alert.   Psychiatric:         Mood and Affect: Mood normal.         Behavior: Behavior is cooperative.         Assessment/Plan   Problem List Items Addressed This Visit          Respiratory    COPD (chronic obstructive pulmonary disease) (CMS/[facility])     Stable            Circulatory    HTN (hypertension)     Continue monitor blood pressure.  Continue antihypertensive meds.            Hematologic    Anemia     Stable        Medications, treatments, and labs reviewed  Continue medications and treatments as listed in [facility]    Scribe Attestation  By signing my name below, Natalya POLLOCK Scribe   attest that this documentation has been prepared under the direction and in the presence of Shyla Kan MD.    Provider Attestation - Scribe documentation  All medical record entries  made by the Scribe were at my direction and personally dictated by me. I have reviewed the chart and agree that the record accurately reflects my personal performance of the history, physical exam, discussion and plan.

## 2023-05-08 NOTE — LETTER
Patient: Norah Cleaning  : 1940    Encounter Date: 2023    Subjective  Chief complaint: Norah Cleaning is a 82 y.o. female who is a long term resident patient being seen and evaluated for multiple medical problems.  Patient presents for weakness    HPI:  patient presents for general medical care and f/u.  Patient seen and examined at bedside.  No issues per nursing.  Patient has no acute complaints.  Denies chest pain and headache.  denies sob, wheezing, cough.  denies fatigue, sob, and palpitations.  No acute distress.        Review of Systems  All systems reviewed and negative except for what was mentioned in the HPI    Vital signs: 138/76,  97%    Objective  Physical Exam  Constitutional:       General: She is not in acute distress.  Eyes:      Extraocular Movements: Extraocular movements intact.   Cardiovascular:      Rate and Rhythm: Regular rhythm.   Pulmonary:      Effort: Pulmonary effort is normal.      Breath sounds: Normal breath sounds.   Abdominal:      General: Bowel sounds are normal.      Palpations: Abdomen is soft.   Musculoskeletal:      Cervical back: Neck supple.      Right lower leg: No edema.      Left lower leg: No edema.   Neurological:      Mental Status: She is alert.   Psychiatric:         Mood and Affect: Mood normal.         Behavior: Behavior is cooperative.         Assessment/Plan  Problem List Items Addressed This Visit          Respiratory    COPD (chronic obstructive pulmonary disease) (CMS/[facility])     Stable  Monitor POX  Continue current meds as ordered            Circulatory    HTN (hypertension)     Continue to monitor blood pressure.    Continue antihypertensive meds.              Hematologic    Anemia     Stable  Monitor routine labs  Monitor for bleeding        Medications, treatments, and labs reviewed  Continue medications and treatments as listed in [facility]    Scribe Attestation  By signing my name below, I, Natalya Loaiza, Scribe   attest that this  documentation has been prepared under the direction and in the presence of Shyla Kan MD.    Provider Attestation - Scribe documentation  All medical record entries made by the Scribe were at my direction and personally dictated by me. I have reviewed the chart and agree that the record accurately reflects my personal performance of the history, physical exam, discussion and plan.  1. Chronic obstructive pulmonary disease, unspecified COPD type (CMS/HCC)        2. Hypertension, unspecified type        3. Anemia, unspecified type              Electronically Signed By: Shyla Kan MD   5/11/23  4:53 PM

## 2023-05-11 NOTE — PROGRESS NOTES
Subjective   Chief complaint: Norah Cleaning is a 82 y.o. female who is a long term resident patient being seen and evaluated for multiple medical problems.  Patient presents for weakness    HPI:  patient presents for general medical care and f/u.  Patient seen and examined at bedside.  No issues per nursing.  Patient has no acute complaints.  Denies chest pain and headache.  denies sob, wheezing, cough.  denies fatigue, sob, and palpitations.  No acute distress.        Review of Systems  All systems reviewed and negative except for what was mentioned in the HPI    Vital signs: 138/76,  97%    Objective   Physical Exam  Constitutional:       General: She is not in acute distress.  Eyes:      Extraocular Movements: Extraocular movements intact.   Cardiovascular:      Rate and Rhythm: Regular rhythm.   Pulmonary:      Effort: Pulmonary effort is normal.      Breath sounds: Normal breath sounds.   Abdominal:      General: Bowel sounds are normal.      Palpations: Abdomen is soft.   Musculoskeletal:      Cervical back: Neck supple.      Right lower leg: No edema.      Left lower leg: No edema.   Neurological:      Mental Status: She is alert.   Psychiatric:         Mood and Affect: Mood normal.         Behavior: Behavior is cooperative.         Assessment/Plan   Problem List Items Addressed This Visit          Respiratory    COPD (chronic obstructive pulmonary disease) (CMS/[facility])     Stable  Monitor POX  Continue current meds as ordered            Circulatory    HTN (hypertension)     Continue to monitor blood pressure.    Continue antihypertensive meds.              Hematologic    Anemia     Stable  Monitor routine labs  Monitor for bleeding        Medications, treatments, and labs reviewed  Continue medications and treatments as listed in [facility]    Scribe Attestation  By signing my name below, Natalya POLLOCK, Scribe   attest that this documentation has been prepared under the direction and in the presence of  Shyla Kan MD.    Provider Attestation - Scribe documentation  All medical record entries made by the Scribe were at my direction and personally dictated by me. I have reviewed the chart and agree that the record accurately reflects my personal performance of the history, physical exam, discussion and plan.  1. Chronic obstructive pulmonary disease, unspecified COPD type (CMS/HCC)        2. Hypertension, unspecified type        3. Anemia, unspecified type

## 2023-06-08 NOTE — LETTER
Patient: Norah Cleaning  : 1940    Encounter Date: 2023    Subjective  Chief complaint: Norah Cleaning is a 82 y.o. female who is a long term resident patient being seen and evaluated for multiple medical problems.  Patient presents for weakness    HPI:  patient presents for general medical care and f/u.  Patient seen and examined at bedside.  No issues per nursing.  Patient has no acute complaints.  Denies chest pain and headache.  denies sob, wheezing, cough.  denies fatigue, sob, and palpitations.  No acute distress.        Review of Systems  All systems reviewed and negative except for what was mentioned in the HPI    Vital signs: 136/72,  97%    Objective  Physical Exam  Constitutional:       General: She is not in acute distress.  Eyes:      Extraocular Movements: Extraocular movements intact.   Cardiovascular:      Rate and Rhythm: Regular rhythm.   Pulmonary:      Effort: Pulmonary effort is normal.      Breath sounds: Normal breath sounds.   Abdominal:      General: Bowel sounds are normal.      Palpations: Abdomen is soft.   Musculoskeletal:      Cervical back: Neck supple.      Right lower leg: No edema.      Left lower leg: No edema.   Neurological:      Mental Status: She is alert.   Psychiatric:         Mood and Affect: Mood normal.         Behavior: Behavior is cooperative.         Assessment/Plan  Problem List Items Addressed This Visit          Respiratory    COPD (chronic obstructive pulmonary disease) (CMS/[facility])     Stable  Monitor POX  Continue current meds as ordered            Circulatory    HTN (hypertension)     Continue to monitor blood pressure.    Continue antihypertensive meds.              Hematologic    Anemia     Stable  Monitor routine labs  Monitor for bleeding        Medications, treatments, and labs reviewed  Continue medications and treatments as listed in [facility]    Scribe Attestation  By signing my name below, I, Natalya Loaiza, Scribe   attest that this  documentation has been prepared under the direction and in the presence of Shyla Kan MD.    Provider Attestation - Scribe documentation  All medical record entries made by the Scribe were at my direction and personally dictated by me. I have reviewed the chart and agree that the record accurately reflects my personal performance of the history, physical exam, discussion and plan.  1. Hypertension, unspecified type        2. Chronic obstructive pulmonary disease, unspecified COPD type (CMS/[facility])        3. Anemia, unspecified type              Electronically Signed By: Shyla Kan MD   6/12/23  3:39 PM

## 2023-06-12 NOTE — PROGRESS NOTES
Subjective   Chief complaint: Norah Cleaning is a 82 y.o. female who is a long term resident patient being seen and evaluated for multiple medical problems.  Patient presents for weakness    HPI:  patient presents for general medical care and f/u.  Patient seen and examined at bedside.  No issues per nursing.  Patient has no acute complaints.  Denies chest pain and headache.  denies sob, wheezing, cough.  denies fatigue, sob, and palpitations.  No acute distress.        Review of Systems  All systems reviewed and negative except for what was mentioned in the HPI    Vital signs: 136/72,  97%    Objective   Physical Exam  Constitutional:       General: She is not in acute distress.  Eyes:      Extraocular Movements: Extraocular movements intact.   Cardiovascular:      Rate and Rhythm: Regular rhythm.   Pulmonary:      Effort: Pulmonary effort is normal.      Breath sounds: Normal breath sounds.   Abdominal:      General: Bowel sounds are normal.      Palpations: Abdomen is soft.   Musculoskeletal:      Cervical back: Neck supple.      Right lower leg: No edema.      Left lower leg: No edema.   Neurological:      Mental Status: She is alert.   Psychiatric:         Mood and Affect: Mood normal.         Behavior: Behavior is cooperative.         Assessment/Plan   Problem List Items Addressed This Visit          Respiratory    COPD (chronic obstructive pulmonary disease) (CMS/[facility])     Stable  Monitor POX  Continue current meds as ordered            Circulatory    HTN (hypertension)     Continue to monitor blood pressure.    Continue antihypertensive meds.              Hematologic    Anemia     Stable  Monitor routine labs  Monitor for bleeding        Medications, treatments, and labs reviewed  Continue medications and treatments as listed in [facility]    Scribe Attestation  By signing my name below, Natalya POLLOCK, Scribe   attest that this documentation has been prepared under the direction and in the presence of  Shyla Kan MD.    Provider Attestation - Scribe documentation  All medical record entries made by the Scribe were at my direction and personally dictated by me. I have reviewed the chart and agree that the record accurately reflects my personal performance of the history, physical exam, discussion and plan.  1. Hypertension, unspecified type        2. Chronic obstructive pulmonary disease, unspecified COPD type (CMS/HCC)        3. Anemia, unspecified type

## 2023-07-11 PROBLEM — M19.90 OSTEOARTHRITIS: Status: ACTIVE | Noted: 2023-01-01

## 2023-07-11 PROBLEM — F32.A DEPRESSION: Status: ACTIVE | Noted: 2023-01-01

## 2023-07-11 PROBLEM — K21.9 GERD (GASTROESOPHAGEAL REFLUX DISEASE): Status: ACTIVE | Noted: 2023-01-01

## 2023-07-11 NOTE — PROGRESS NOTES
PROGRESS NOTE    Subjective   Chief complaint: Norah Cleaning is a 82 y.o. female who is a long term resident patient being seen and evaluated for monthly general medical care and follow-up.    HPI:   Patient presents for general medical care and f/u.  Patient seen and examined at bedside.  Patient with dx of HTN.  BP at goal.  Denies chest pain and headache.  COPD is stable, denies sob, wheezing, cough.  Patient with diagnosis of depression.  Mood is stable.  Patient denies feeling depressed, down and thoughts of harming self or others.  Patient with dx of OA.  Pain controlled with current medication regime.  GERD controlled.  Denies heartburn, regurgitation, epigastric discomfort, sour taste, and cough.      Objective   Vital signs:   122/62, 97.6, 62, 18, 96%  Physical Exam  Constitutional:       General: She is not in acute distress.  Eyes:      Extraocular Movements: Extraocular movements intact.   Cardiovascular:      Rate and Rhythm: Regular rhythm.   Pulmonary:      Effort: Pulmonary effort is normal.      Breath sounds: Normal breath sounds.      Comments: O2  Abdominal:      General: Bowel sounds are normal.      Palpations: Abdomen is soft.   Musculoskeletal:      Cervical back: Neck supple.      Right lower leg: No edema.      Left lower leg: No edema.   Neurological:      Mental Status: She is alert.   Psychiatric:         Mood and Affect: Mood normal.         Behavior: Behavior is cooperative.         Assessment/Plan   Problem List Items Addressed This Visit       HTN (hypertension) - Primary     Monitor BP  Continue antihypertensive meds         COPD (chronic obstructive pulmonary disease) (CMS/[facility])     Stable  O2  Monitor POX  Continue current meds as ordered         GERD (gastroesophageal reflux disease)     Continue Protonix         Depression     Stable  Continue antidepressant  Monitor mood           Osteoarthritis     Continue pain meds  Monitor pain          Medications, treatments, and  labs reviewed  Continue medications and treatments as listed in EMR    Scribe Attestation  I, Isaac Castilloibfox   attest that this documentation has been prepared under the direction and in the presence of Shyla Kan MD    Provider Attestation - Scribe documentation  All medical record entries made by the Scribe were at my direction and personally dictated by me. I have reviewed the chart and agree that the record accurately reflects my personal performance of the history, physical exam, discussion and plan.   Shyla Kan MD

## 2023-08-14 NOTE — LETTER
Patient: Norah Cleaning  : 1940    Encounter Date: 2023    PROGRESS NOTE    Subjective  Chief complaint: Norah Cleaning is a 82 y.o. female who is a long term resident patient being seen and evaluated for monthly general medical care and follow-up.    HPI:   Patient presents for general medical care and f/u.  Patient seen and examined at bedside.  Patient with dx of HTN.  BP at goal.  Denies chest pain and headache.  COPD is stable, denies sob, wheezing, cough.  Patient with diagnosis of depression.  Mood is stable.  Patient denies feeling depressed, down and thoughts of harming self or others. No acute distress.       Objective  Vital signs:   123/64,  96%  Physical Exam  Constitutional:       General: She is not in acute distress.  Eyes:      Extraocular Movements: Extraocular movements intact.   Cardiovascular:      Rate and Rhythm: Regular rhythm.   Pulmonary:      Effort: Pulmonary effort is normal.      Breath sounds: Normal breath sounds.      Comments: O2  Abdominal:      General: Bowel sounds are normal.      Palpations: Abdomen is soft.   Musculoskeletal:      Cervical back: Neck supple.      Right lower leg: No edema.      Left lower leg: No edema.   Neurological:      Mental Status: She is alert.   Psychiatric:         Mood and Affect: Mood normal.         Behavior: Behavior is cooperative.         Assessment/Plan  Problem List Items Addressed This Visit       HTN (hypertension) - Primary     Monitor BP  Continue antihypertensive meds         COPD (chronic obstructive pulmonary disease) (CMS/[facility])     Stable  O2  Monitor POX  Continue current meds as ordered         Depression     Stable  Continue antidepressant  Monitor mood          Medications, treatments, and labs reviewed  Continue medications and treatments as listed in EMR    Scribe Attestation  I, Natalya Loaiza , Duane   attest that this documentation has been prepared under the direction and in the presence of Shyla DYE  MD Lucius    Provider Attestation - Scribe documentation  All medical record entries made by the Scribe were at my direction and personally dictated by me. I have reviewed the chart and agree that the record accurately reflects my personal performance of the history, physical exam, discussion and plan.   Shyla Kan MD    1. Hypertension, unspecified type        2. Chronic obstructive pulmonary disease, unspecified COPD type (CMS/HCC)        3. Depression, unspecified depression type              Electronically Signed By: Shyla Kan MD   8/14/23  3:41 PM

## 2023-08-14 NOTE — PROGRESS NOTES
PROGRESS NOTE    Subjective   Chief complaint: Norah Cleaning is a 82 y.o. female who is a long term resident patient being seen and evaluated for monthly general medical care and follow-up.    HPI:   Patient presents for general medical care and f/u.  Patient seen and examined at bedside.  Patient with dx of HTN.  BP at goal.  Denies chest pain and headache.  COPD is stable, denies sob, wheezing, cough.  Patient with diagnosis of depression.  Mood is stable.  Patient denies feeling depressed, down and thoughts of harming self or others. No acute distress.       Objective   Vital signs:   123/64,  96%  Physical Exam  Constitutional:       General: She is not in acute distress.  Eyes:      Extraocular Movements: Extraocular movements intact.   Cardiovascular:      Rate and Rhythm: Regular rhythm.   Pulmonary:      Effort: Pulmonary effort is normal.      Breath sounds: Normal breath sounds.      Comments: O2  Abdominal:      General: Bowel sounds are normal.      Palpations: Abdomen is soft.   Musculoskeletal:      Cervical back: Neck supple.      Right lower leg: No edema.      Left lower leg: No edema.   Neurological:      Mental Status: She is alert.   Psychiatric:         Mood and Affect: Mood normal.         Behavior: Behavior is cooperative.         Assessment/Plan   Problem List Items Addressed This Visit       HTN (hypertension) - Primary     Monitor BP  Continue antihypertensive meds         COPD (chronic obstructive pulmonary disease) (CMS/[facility])     Stable  O2  Monitor POX  Continue current meds as ordered         Depression     Stable  Continue antidepressant  Monitor mood          Medications, treatments, and labs reviewed  Continue medications and treatments as listed in EMR    Scribe Attestation  I, Natalya Loaiza Scribfox   attest that this documentation has been prepared under the direction and in the presence of Shyla Kan MD    Provider Attestation - Scribe documentation  All medical record  entries made by the Scribe were at my direction and personally dictated by me. I have reviewed the chart and agree that the record accurately reflects my personal performance of the history, physical exam, discussion and plan.   Shyla Kan MD    1. Hypertension, unspecified type        2. Chronic obstructive pulmonary disease, unspecified COPD type (CMS/[facility])        3. Depression, unspecified depression type

## 2023-09-14 NOTE — PROGRESS NOTES
PROGRESS NOTE    Subjective   Chief complaint: Norah Cleaning is a 82 y.o. female who is a long term resident patient being seen and evaluated for monthly general medical care and follow-up.    HPI:   Patient presents for general medical care and f/u.  Patient seen and examined at bedside.  Patient with dx of  COPD is stable, denies sob, wheezing, cough.  Patient with diagnosis of depression.  Mood is stable.  Patient denies feeling depressed, down and thoughts of harming self or others.   GERD controlled.  Denies heartburn, regurgitation, epigastric discomfort, sour taste, and cough.      Objective   Vital signs:   122/62, 96%  Physical Exam  Constitutional:       General: She is not in acute distress.  Eyes:      Extraocular Movements: Extraocular movements intact.   Cardiovascular:      Rate and Rhythm: Regular rhythm.   Pulmonary:      Effort: Pulmonary effort is normal.      Breath sounds: Normal breath sounds.      Comments: O2  Abdominal:      General: Bowel sounds are normal.      Palpations: Abdomen is soft.   Musculoskeletal:      Cervical back: Neck supple.      Right lower leg: No edema.      Left lower leg: No edema.   Neurological:      Mental Status: She is alert.   Psychiatric:         Mood and Affect: Mood normal.         Behavior: Behavior is cooperative.         Assessment/Plan   Problem List Items Addressed This Visit       COPD (chronic obstructive pulmonary disease) (CMS/[facility])     Stable  O2  Monitor POX  Continue current meds as ordered         GERD (gastroesophageal reflux disease) - Primary     Continue Protonix         Depression     Stable  Continue antidepressant  Monitor mood          Medications, treatments, and labs reviewed  Continue medications and treatments as listed in EMR    Scribe Attestation  I, uDane Lau   attest that this documentation has been prepared under the direction and in the presence of Shyla Kan MD    Provider Attestation - Duane  documentation  All medical record entries made by the Scribe were at my direction and personally dictated by me. I have reviewed the chart and agree that the record accurately reflects my personal performance of the history, physical exam, discussion and plan.   Shyla Kan MD    1. Gastroesophageal reflux disease without esophagitis        2. Chronic obstructive pulmonary disease, unspecified COPD type (CMS/[facility])        3. Depression, unspecified depression type

## 2023-09-14 NOTE — LETTER
Patient: Norah Cleaning  : 1940    Encounter Date: 2023    PROGRESS NOTE    Subjective  Chief complaint: Norah Cleaning is a 82 y.o. female who is a long term resident patient being seen and evaluated for monthly general medical care and follow-up.    HPI:   Patient presents for general medical care and f/u.  Patient seen and examined at bedside.  Patient with dx of  COPD is stable, denies sob, wheezing, cough.  Patient with diagnosis of depression.  Mood is stable.  Patient denies feeling depressed, down and thoughts of harming self or others.   GERD controlled.  Denies heartburn, regurgitation, epigastric discomfort, sour taste, and cough.      Objective  Vital signs:   122/62, 96%  Physical Exam  Constitutional:       General: She is not in acute distress.  Eyes:      Extraocular Movements: Extraocular movements intact.   Cardiovascular:      Rate and Rhythm: Regular rhythm.   Pulmonary:      Effort: Pulmonary effort is normal.      Breath sounds: Normal breath sounds.      Comments: O2  Abdominal:      General: Bowel sounds are normal.      Palpations: Abdomen is soft.   Musculoskeletal:      Cervical back: Neck supple.      Right lower leg: No edema.      Left lower leg: No edema.   Neurological:      Mental Status: She is alert.   Psychiatric:         Mood and Affect: Mood normal.         Behavior: Behavior is cooperative.         Assessment/Plan  Problem List Items Addressed This Visit       COPD (chronic obstructive pulmonary disease) (CMS/[facility])     Stable  O2  Monitor POX  Continue current meds as ordered         GERD (gastroesophageal reflux disease) - Primary     Continue Protonix         Depression     Stable  Continue antidepressant  Monitor mood          Medications, treatments, and labs reviewed  Continue medications and treatments as listed in EMR    Scribe Attestation  I, Natalya Loaiza , Duane   attest that this documentation has been prepared under the direction and in the  presence of Shyla Kan MD    Provider Attestation - Scribe documentation  All medical record entries made by the Scribe were at my direction and personally dictated by me. I have reviewed the chart and agree that the record accurately reflects my personal performance of the history, physical exam, discussion and plan.   Shyla Kan MD    1. Gastroesophageal reflux disease without esophagitis        2. Chronic obstructive pulmonary disease, unspecified COPD type (CMS/[facility])        3. Depression, unspecified depression type              Electronically Signed By: Shyla Kan MD   9/14/23  5:17 PM

## 2023-09-21 PROBLEM — R82.90 FOUL SMELLING URINE: Status: ACTIVE | Noted: 2023-01-01

## 2023-09-21 NOTE — LETTER
Patient: Norah Cleaning  : 1940    Encounter Date: 2023    PROGRESS NOTE    Subjective  Chief complaint: Norah Cleaning is a 82 y.o. female who is a long term care patient being seen and evaluated for  foul-smelling urine    HPI:  .  Patient presents with complaints of foul-smelling urine.  She denies fever, chills, burning with urination or abdominal discomfort.  Denies chest pain or headache.  Denies shortness of breath orthopnea.  No acute distress.      Objective  Vital signs:  124/72, 98%    Physical Exam  Constitutional:       General: She is not in acute distress.  Eyes:      Extraocular Movements: Extraocular movements intact.   Cardiovascular:      Rate and Rhythm: Normal rate and regular rhythm.   Pulmonary:      Effort: Pulmonary effort is normal.      Breath sounds: Normal breath sounds.   Abdominal:      General: Bowel sounds are normal.      Palpations: Abdomen is soft.   Musculoskeletal:      Cervical back: Neck supple.      Right lower leg: No edema.      Left lower leg: No edema.   Neurological:      Mental Status: She is alert.   Psychiatric:         Mood and Affect: Mood normal.         Behavior: Behavior is cooperative.         Assessment/Plan  Problem List Items Addressed This Visit       HTN (hypertension)     Monitor BP  Continue antihypertensive meds         COPD (chronic obstructive pulmonary disease) (CMS/[facility]) - Primary     Stable  O2  Monitor POX  Continue current meds as ordered         Foul smelling urine      UA C&S ordered          Medications, treatments, and labs reviewed  Continue medications and treatments as listed in [facility]    Scribe Attestation  By signing my name below, INatalya Scribe   attest that this documentation has been prepared under the direction and in the presence of Shyla Kan MD.    Provider Attestation - Scribe documentation  All medical record entries made by the Scribe were at my direction and personally dictated by me. I have  reviewed the chart and agree that the record accurately reflects my personal performance of the history, physical exam, discussion and plan.    1. Chronic obstructive pulmonary disease, unspecified COPD type (CMS/HCC)        2. Hypertension, unspecified type        3. Foul smelling urine               Electronically Signed By: Shyla Kan MD   9/21/23  4:10 PM

## 2023-09-21 NOTE — PROGRESS NOTES
PROGRESS NOTE    Subjective   Chief complaint: Norah Cleaning is a 82 y.o. female who is a long term care patient being seen and evaluated for  foul-smelling urine    HPI:  .  Patient presents with complaints of foul-smelling urine.  She denies fever, chills, burning with urination or abdominal discomfort.  Denies chest pain or headache.  Denies shortness of breath orthopnea.  No acute distress.      Objective   Vital signs:  124/72, 98%    Physical Exam  Constitutional:       General: She is not in acute distress.  Eyes:      Extraocular Movements: Extraocular movements intact.   Cardiovascular:      Rate and Rhythm: Normal rate and regular rhythm.   Pulmonary:      Effort: Pulmonary effort is normal.      Breath sounds: Normal breath sounds.   Abdominal:      General: Bowel sounds are normal.      Palpations: Abdomen is soft.   Musculoskeletal:      Cervical back: Neck supple.      Right lower leg: No edema.      Left lower leg: No edema.   Neurological:      Mental Status: She is alert.   Psychiatric:         Mood and Affect: Mood normal.         Behavior: Behavior is cooperative.         Assessment/Plan   Problem List Items Addressed This Visit       HTN (hypertension)     Monitor BP  Continue antihypertensive meds         COPD (chronic obstructive pulmonary disease) (CMS/[facility]) - Primary     Stable  O2  Monitor POX  Continue current meds as ordered         Foul smelling urine      UA C&S ordered          Medications, treatments, and labs reviewed  Continue medications and treatments as listed in [facility]    Scribe Attestation  By signing my name below, INatalya Scribe   attest that this documentation has been prepared under the direction and in the presence of Shyla Kan MD.    Provider Attestation - Scribe documentation  All medical record entries made by the Scribe were at my direction and personally dictated by me. I have reviewed the chart and agree that the record accurately reflects my  personal performance of the history, physical exam, discussion and plan.    1. Chronic obstructive pulmonary disease, unspecified COPD type (CMS/HCC)        2. Hypertension, unspecified type        3. Foul smelling urine

## 2023-10-01 PROBLEM — J96.90 RESPIRATORY FAILURE (MULTI): Status: ACTIVE | Noted: 2023-01-01
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
## 2023-10-02 NOTE — PROGRESS NOTES
Norah Cleaning is a 82 y.o. female on day 1 of admission presenting with Respiratory failure (CMS/HCC).    Met with patient at bedside, explained role of TCC, patient states she is long term care resident of [facility] and will return there at [place], referral asked to be sent.

## 2023-10-02 NOTE — PROGRESS NOTES
Pharmacy Medication History Review    Norah Cleaning is a 82 y.o. female admitted for Respiratory failure (CMS/[facility]). Pharmacy reviewed the patient's [id number] medications and allergies for accuracy.    The list below reflectives the updated PTA list. Please review each medication in order reconciliation for additional clarification and justification.  Medications Prior to Admission   Medication Sig Dispense Refill Last Dose    acetaminophen (Tylenol) 325 mg tablet Take 2 tablets (650 mg) by mouth every 4 hours if needed for moderate pain (4 - 6) or mild pain (1 - 3).   Unknown    albuterol 90 mcg/actuation inhaler Inhale 2 puffs every 6 hours if needed.   Unknown    alendronate (Fosamax) 70 mg tablet Take 1 tablet (70 mg) by mouth every 7 days. On Sunday   Unknown    aspirin 81 mg chewable tablet Chew 1 tablet (81 mg) once daily.   Unknown    atorvastatin (Lipitor) 40 mg tablet Take 1 tablet (40 mg) by mouth once daily at bedtime.   Unknown    acetaminophen (Tylenol) 325 mg tablet Take 2 tablets (650 mg) by mouth 3 times a day.   Unknown    acetaminophen (Tylenol) 650 mg suppository Insert 1 suppository (650 mg) into the rectum every 4 hours if needed for mild pain (1 - 3) or fever (temp greater than 38.0 C).   Unknown    benzonatate (Tessalon) 100 mg capsule Take 1 capsule (100 mg) by mouth 3 times a day as needed for cough. Do not crush or chew.   Unknown    bisacodyl (Dulcolax, bisacodyl,) 10 mg suppository Insert 1 suppository (10 mg) into the rectum 1 time if needed for constipation (if no results from Milk of mag).   Unknown    cholecalciferol (Vitamin D-3) 50 MCG (2000 UT) tablet Take 1 tablet (50 mcg) by mouth 2 times a day.   Unknown    diclofenac sodium (Voltaren) 1 % gel gel Apply 1 Application topically 2 times a day as needed (pain).       DULoxetine (Cymbalta) 30 mg DR capsule Take 1 capsule (30 mg) by mouth once daily. Do not crush or chew.   Unknown    ferrous sulfate 325 (65 Fe) MG  tablet Take 1 tablet (65 mg of iron) by mouth once daily.   Unknown    fluticasone propion-salmeteroL (Advair Diskus) 500-50 mcg/dose diskus inhaler Inhale 1 puff 2 times a day. Rinse mouth with water after use to reduce aftertaste and incidence of candidiasis. Do not swallow.   Unknown    gabapentin (Neurontin) 100 mg capsule Take 2 capsules (200 mg) by mouth 3 times a day.   Unknown    guaiFENesin (Mucinex) 600 mg 12 hr tablet Take 2 tablets (1,200 mg) by mouth 2 times a day. Do not crush, chew, or split.   Unknown    ipratropium-albuteroL (Duo-Neb) 0.5-2.5 mg/3 mL nebulizer solution Take 3 mL by nebulization every 6 hours if needed for wheezing.   Unknown    Lactobacillus acidophilus 1 billion cell capsule Take 1 capsule by mouth 2 times a day.   Unknown    lidocaine 4 % patch Place 1 patch on the skin once daily. Remove & discard patch within 12 hours or as directed by MD. Apply to right knee       losartan (Cozaar) 50 mg tablet Take 1 tablet (50 mg) by mouth once daily.   Unknown    magnesium hydroxide (Milk of Magnesia) 400 mg/5 mL suspension Take 30 mL by mouth once daily as needed for constipation (for no BM in 3 days).       melatonin 5 mg tablet Take 1 tablet (5 mg) by mouth once daily at bedtime.   Unknown    metoprolol succinate XL (Toprol-XL) 25 mg 24 hr tablet Take 0.5 tablets (12.5 mg) by mouth once daily. Do not crush or chew.   Unknown    mineral oil enema Insert 1 enema into the rectum once daily as needed for constipation (if no results from suppository. Call MD if no results from enema).       montelukast (Singulair) 10 mg tablet Take 1 tablet (10 mg) by mouth once daily.   Unknown    nicotine polacrilex (Commit) 2 mg lozenge Use 1 lozenge (2 mg) in the mouth or throat every 2 hours if needed for smoking cessation.   Unknown    olopatadine (Patanol) 0.1 % ophthalmic solution Administer 1 drop into both eyes 3 times a day.   Unknown    ondansetron (Zofran) 4 mg tablet Take 1 tablet (4 mg) by mouth  every 8 hours if needed for nausea or vomiting.   Unknown    oxyCODONE-acetaminophen (Percocet) 5-325 mg tablet Take by mouth every 4 hours if needed for severe pain (7 - 10).   Unknown    pantoprazole (ProtoNix) 40 mg EC tablet Take 1 tablet (40 mg) by mouth once daily in the morning. Take before meals. Do not crush, chew, or split.   Unknown    sennosides (Senokot) 8.6 mg tablet Take 1 tablet (8.6 mg) by mouth 2 times a day.   Unknown    sildenafil (Viagra) 25 mg tablet Take 1 tablet (25 mg) by mouth 3 times a day. For hypertension       tiotropium (Spiriva Respimat) 2.5 mcg/actuation inhaler Inhale 2 puffs once daily.   Unknown    torsemide (Demadex) 10 mg tablet Take 1 tablet (10 mg) by mouth once daily. For edema           The list below reflectives the updated allergy list. Please review each documented allergy for additional clarification and justification.  Allergies  Reviewed by Blu Youngblood MD on 10/1/2023   No Known Allergies         Below are additional concerns with the patient's PTA list.  Per OMR from [facility]    Madonna Felix CPhT

## 2023-10-02 NOTE — CARE PLAN
The patient's goals for the shift include pain control    The clinical goals for the shift include for patient to get CT    Over the shift, the patient did not make progress toward the following goals. Barriers to progression include prn pain medication not being ordered. Recommendations to address these barriers include patient education.patient needs educated on non pharmical interventions for pain.
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
## 2023-10-02 NOTE — CARE PLAN
Problem: Respiratory  Goal: No signs of respiratory distress (eg. Use of accessory muscles. Peds grunting)  10/2/2023 1914 by Susana Simon RN  Outcome: Progressing  10/2/2023 1912 by Susana Simon RN  Outcome: Progressing   The patient's goals for the shift include pain control    The clinical goals for the shift include for patient to get CT

## 2023-10-02 NOTE — PROGRESS NOTES
Norah Cleaning is a 82 y.o. female on day 1 of admission presenting with Respiratory failure (CMS/HCC).      Subjective    Sob has oxygen on. She has a rul mass on CT neck . I discussed results with patient and she would like further work up. Sob is slightly better. Less wheezy today       Objective     Last Recorded Vitals  /70   Pulse 80   Temp 36.8 °C (98.2 °F)   Resp 18   Wt 45.2 kg (99 lb 10.4 oz)   SpO2 99%   Intake/Output last 3 Shifts:  No intake or output data in the 24 hours ending 10/02/23 1055    Admission Weight  Weight: 45.4 kg (100 lb) (10/01/23 1040)    Daily Weight  10/02/23 : 45.2 kg (99 lb 10.4 oz)    Image Results  CT soft tissue neck w IV contrast  Narrative: Interpreted By:  Jaron Carlson,   STUDY:  CT SOFT TISSUE NECK W IV CONTRAST;  10/2/2023 2:09 am      INDICATION:  Signs/Symptoms:narrowing of trachea difficulty swallowing.      COMPARISON:  None.      ACCESSION NUMBER(S):  [id number]      ORDERING CLINICIAN:  MOLLY ADAMES      TECHNIQUE:  Following intravenous injection  axial CT was performed from the  skullbase to the thoracic inlet and multiplanar reconstructions were  made.      FINDINGS:  *  The visualized paranasal sinuses nasopharynx and oropharynx are  unremarkable. *The mandible, maxilla and temporomandibular joints are  normal. *The major salivary glands are normal.  *There is no measurable cervical lymphadenopathy.  *The larynx and related cartilages are normal.  *The thyroid gland is normal.  *The thoracic inlet is normal.  *The airway and visualized esophagus are normal  *There are abundant calcifications of the thoracic aorta and the  great vessels. There is marked dilatation to a proximally 2 cm  maximum diameter, at the left carotid bifurcation with peripheral  calcification. There is no evidence of intraluminal thrombus *There  is a 2 cm mass with central necrosis or emphysematous bulla at the  right pulmonary apex that can not be further characterized.  This  nodule has increased in size compared to the previous examination  dated April 2022 neoplasm not excluded.      Impression: * There is no evidence of mass, cyst or adenopathy in the neck.  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 Notification  was initiated on 10/2/2023 at 7:19 am by  Jaron Carlson.  (**-OCF-**) Instructions:          THIS EXAMINATION WAS INTERPRETED AT [facility]      Signed by: Jaron Carlson 10/2/2023 7:21 AM  Dictation workstation:   [id number]      Physical Exam  Vitals reviewed.   Constitutional:       Appearance: Normal appearance.   HENT:      Head: Normocephalic.      Nose: Nose normal.      Mouth/Throat:      Mouth: Mucous membranes are moist.      Pharynx: Oropharynx is clear.   Eyes:      Pupils: Pupils are equal, round, and reactive to light.   Cardiovascular:      Rate and Rhythm: Normal rate and regular rhythm.   Pulmonary:      Effort: Pulmonary effort is normal.      Breath sounds: mild Wheezing improved now just in LLL present.   Abdominal:      General: Abdomen is flat. Bowel sounds are normal.      Palpations: Abdomen is soft.   Musculoskeletal:         General: Normal range of motion.      Cervical back: Normal range of motion and neck supple.   Skin:     General: Skin is warm and dry.      Capillary Refill: Capillary refill takes 2 to 3 seconds.   Neurological:      General: No focal deficit present.      Mental Status: She is alert.   Psychiatric:         Mood and Affect: Mood normal.         Behavior: Behavior normal.         Thought Content: Thought content normal.         Judgment: Judgment normal.      Relevant Results  Assessment/Plan        Acute on chronic hypoxemic respiratory failure-likely secondary to obstructive pneumonia-IV Zosyn, duo nebulizer treatment, Solu-Medrol 40 mg IV twice a day.  CT neck showed rul mass. Patient had ct chest ordered but possibly  secondary to technical difficulty with transition to new EMR order was deleted on accident. Will wait 1 day to order ct chest with contrast of chest  to avoid contrast induced nephropathy. Pulm consulted. She will need lung biopsy. Mbs ordered       chronic COPD exacerbation-no exacerbation. Suspect all symptoms secondary to post obstructive pneumonia.      History of chronic congestive heart failure-patient is euvolemic.  On chest x-ray she was noted to have prominent interstitial markings.  Her BNP is chronically elevated and is currently 700.         I spent 35 minutes in the professional and overall care of this patient.          Principal Problem:    Respiratory failure (CMS/[facility])                  Dai Diaz, APRN-CNS

## 2023-10-02 NOTE — CARE PLAN
The patient's goals for the shift include pain control    The clinical goals for the shift include for patient to get CT

## 2023-10-02 NOTE — CONSULTS
"Consult Note    Patient is a 82 y.o. female with a past medical history of hypertension, GERD, severe COPD, chronic hypoxia-on 3.5 to 4.5 L nasal cannula oxygen continuously, right lower lobe pneumonia, abdominal aortic aneurysm, coronary artery disease, congestive heart failure, depression/anxiety, breast surgery for benign tumor, fibromyalgia, irritable bowel syndrome, aortic stenosis, pelvic fracture, fall, peripheral vascular disease, left THR, spinal stenosis, COVID pneumonia, respiratory failure and pulmonary hypertension, who was [facility] on October 1, 2023 with shortness of breath, cough and wheezing for 1 week prior to admission.  In the emergency room she was noted to have a temperature of 36.7, pulse 80, respiratory rate 22, blood pressure 128/86 and oxygen saturation of 97% on an unknown FiO2.  She was found to have a normal white blood count, CMP, low hematocrit, negative coronavirus PCR and influenza A/B, elevated BNP of 735, normal troponin and an admission chest x-ray showing a right lower lobe infiltrate with interstitial changes and COPD which was improved compared to a prior chest x-ray from October 25, 2022.  A CT scan of the neck showed an enlarging right upper lobe mass (2.4 cm) compared to a prior CT scan of the chest from April 20, 2022 (1 cm).  The patient was treated with DuoNeb, prednisone, Solu-Medrol, Zithromax, ceftriaxone and morphine sulfate.  Presently, the patient feels \"okay\".  She denies fevers, sweats, shortness of breath at rest, postnasal drip or weight loss, but does admit to chills, dyspnea on exertion, occasional mild chest pain, leg pains, cough productive of yellow sputum and wheezing.  The patient states that her father had tuberculosis but she has had negative PPDs in the past.    Past medical history: As above.    Allergies: Per EMR.    Medications: Per EMR.    Social history:  Cigarettes-smoked 1 pack a day for 60 years but quit 3 years " ago.  Alcohol-occasional.  Pets, travel and hobby history-noncontributory.  Occupation-office work.    Family history:  Pulmonary disease-tuberculosis in her father.  Coronary artery disease-brother.  Hypertension-mother and siblings.  Diabetes-none.  Cancer-sisters (breast) and sister (colon).    Review of systems:  Swallowing problems-denies.  GI-none.  -none.  Musculoskeletal-DJD pain.  Skin-no rashes.    PE:    Visit Vitals  /70   Pulse 80   Temp 36.8 °C (98.2 °F)   Resp 18      Physical Exam:  Gen:  Awake, alert and in NAD; on 3 L NC O2.  HEENT:  Normal conjunctivae and pharynx; no sinus tenderness.  Neck:  No thyroid enlargement or adenopathy.  Pulm: Decreased breath sounds with dullness and positive E to A changes on the right; faint rales and rhonchi bilaterally; no wheezes.  Heart:  RRR with positive S3 and S4; III/VI ALEJANDRA with radiation to the RSB..  Abd:  Soft, non-tender and with positive bowel sounds.  Extrem:  No clubbing or edema.  Skin:  No rashes.    Results for orders placed or performed during the hospital encounter of 10/01/23 (from the past 96 hour(s))   CBC and Auto Differential   Result Value Ref Range    WBC 6.0 4.4 - 11.3 x10*3/uL    nRBC 0.0 0.0 - 0.0 /100 WBCs    RBC 3.04 (L) 4.00 - 5.20 x10*6/uL    Hemoglobin 9.2 (L) 12.0 - 16.0 g/dL    Hematocrit 28.7 (L) 36.0 - 46.0 %    MCV 94 80 - 100 fL    MCH 30.3 26.0 - 34.0 pg    MCHC 32.1 32.0 - 36.0 g/dL    RDW 14.2 11.5 - 14.5 %    Platelets 248 150 - 450 x10*3/uL    MPV 9.8 7.5 - 11.5 fL    Neutrophils % 66.2 40.0 - 80.0 %    Immature Granulocytes %, Automated 0.2 0.0 - 0.9 %    Lymphocytes % 21.2 13.0 - 44.0 %    Monocytes % 10.1 2.0 - 10.0 %    Eosinophils % 2.0 0.0 - 6.0 %    Basophils % 0.3 0.0 - 2.0 %    Neutrophils Absolute 4.00 1.60 - 5.50 x10*3/uL    Immature Granulocytes Absolute, Automated 0.01 0.00 - 0.50 x10*3/uL    Lymphocytes Absolute 1.28 0.80 - 3.00 x10*3/uL    Monocytes Absolute 0.61 0.05 - 0.80 x10*3/uL    Eosinophils  Absolute 0.12 0.00 - 0.40 x10*3/uL    Basophils Absolute 0.02 0.00 - 0.10 x10*3/uL   Comprehensive metabolic panel   Result Value Ref Range    Glucose 93 74 - 99 mg/dL    Sodium 138 136 - 145 mmol/L    Potassium 3.9 3.5 - 5.3 mmol/L    Chloride 96 (L) 98 - 107 mmol/L    Bicarbonate 32 21 - 32 mmol/L    Anion Gap 14 10 - 20 mmol/L    Urea Nitrogen 15 6 - 23 mg/dL    Creatinine 0.89 0.50 - 1.05 mg/dL    eGFR 65 >60 mL/min/1.73m*2    Calcium 8.8 8.6 - 10.3 mg/dL    Albumin 3.6 3.4 - 5.0 g/dL    Alkaline Phosphatase 61 33 - 136 U/L    Total Protein 6.8 6.4 - 8.2 g/dL    AST 19 9 - 39 U/L    Bilirubin, Total 0.4 0.0 - 1.2 mg/dL    ALT 9 7 - 45 U/L   Troponin I, High Sensitivity   Result Value Ref Range    Troponin I, High Sensitivity 15 (H) 0 - 13 ng/L   B-type natriuretic peptide   Result Value Ref Range     (H) 0 - 99 pg/mL   Gray Top   Result Value Ref Range    Extra Tube Hold for add-ons.    Sars-CoV-2 PCR, Symptomatic   Result Value Ref Range    Coronavirus 2019, PCR Not Detected Not Detected   Influenza A, and B PCR   Result Value Ref Range    Flu A Result Not Detected Not Detected    Flu B Result Not Detected Not Detected   Basic metabolic panel   Result Value Ref Range    Glucose 166 (H) 74 - 99 mg/dL    Sodium 136 136 - 145 mmol/L    Potassium 4.4 3.5 - 5.3 mmol/L    Chloride 95 (L) 98 - 107 mmol/L    Bicarbonate 31 21 - 32 mmol/L    Anion Gap 14 10 - 20 mmol/L    Urea Nitrogen 17 6 - 23 mg/dL    Creatinine 0.80 0.50 - 1.05 mg/dL    eGFR 74 >60 mL/min/1.73m*2    Calcium 8.3 (L) 8.6 - 10.3 mg/dL   CBC   Result Value Ref Range    WBC 4.5 4.4 - 11.3 x10*3/uL    nRBC 0.0 0.0 - 0.0 /100 WBCs    RBC 2.96 (L) 4.00 - 5.20 x10*6/uL    Hemoglobin 9.1 (L) 12.0 - 16.0 g/dL    Hematocrit 28.3 (L) 36.0 - 46.0 %    MCV 96 80 - 100 fL    MCH 30.7 26.0 - 34.0 pg    MCHC 32.2 32.0 - 36.0 g/dL    RDW 14.2 11.5 - 14.5 %    Platelets 304 150 - 450 x10*3/uL    MPV 9.7 7.5 - 11.5 fL        CT soft tissue neck w IV  contrast    Result Date: 10/2/2023  Interpreted By:  Jaron Carlson, STUDY: CT SOFT TISSUE NECK W IV CONTRAST;  10/2/2023 2:09 am   INDICATION: Signs/Symptoms:narrowing of trachea difficulty swallowing.   COMPARISON: None.   ACCESSION NUMBER(S): [id number]   ORDERING CLINICIAN: MOLLY ADAMES   TECHNIQUE: Following intravenous injection  axial CT was performed from the skullbase to the thoracic inlet and multiplanar reconstructions were made.   FINDINGS: *  The visualized paranasal sinuses nasopharynx and oropharynx are unremarkable. *The mandible, maxilla and temporomandibular joints are normal. *The major salivary glands are normal. *There is no measurable cervical lymphadenopathy. *The larynx and related cartilages are normal. *The thyroid gland is normal. *The thoracic inlet is normal. *The airway and visualized esophagus are normal *There are abundant calcifications of the thoracic aorta and the great vessels. There is marked dilatation to a proximally 2 cm maximum diameter, at the left carotid bifurcation with peripheral calcification. There is no evidence of intraluminal thrombus *There is a 2 cm mass with central necrosis or emphysematous bulla at the right pulmonary apex that can not be further characterized. This nodule has increased in size compared to the previous examination dated April 2022 neoplasm not excluded.       * There is no evidence of mass, cyst or adenopathy in the neck. 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 Notification was initiated on 10/2/2023 at 7:19 am by  Jaron Carlson. (**-OCF-**) Instructions:     THIS EXAMINATION WAS INTERPRETED AT [facility] [place]   Signed by: Jaron Carlson 10/2/2023 7:21 AM Dictation workstation:   [id number]    XR chest 2 views    Result Date: 10/1/2023  Interpreted By:  Matilda Evangelista, STUDY: XR CHEST 2 VIEWS;  10/1/2023 1:05 pm   INDICATION: Signs/Symptoms:cough  + wheezing r/o underlying infiltrate.   COMPARISON: 10/25/2022   ACCESSION NUMBER(S): [id number]   ORDERING CLINICIAN: MAXIMUS ARRIAGA   FINDINGS: The cardiac silhouette is prominent. There are atherosclerotic changes of the aorta.   The lungs are hyperinflated. There is interstitial lung disease. There is more confluent density at the right base.   There is no definite pleural fluid.   There are degenerative changes of the spine. There are compression fractures. The bones are osteoporotic.   There is surgical clips in the neck on the left.   COMPARISON OF FINDING: The right lung is improved in appearance.       Prominent cardiac silhouette.   COPD.   Right basilar infiltrate.   Signed by: Matilda Evangelista 10/1/2023 1:08 PM Dictation workstation:   [id number]       Assessment:  82-year-old woman with a history of hypertension, GERD, severe COPD, chronic hypoxia, right lower lobe pneumonia, abdominal aortic aneurysm, coronary artery disease, congestive heart failure, depression/anxiety, benign breast lesion, fibromyalgia, irritable bowel syndrome, aortic stenosis, pelvic fracture, fall, peripheral vascular disease, left total hip replacement, spinal stenosis, COVID-19 pneumonia, pulmonary hypertension and respiratory failure, admitted with shortness of breath, cough and wheezing, and found to have a normal white blood count, elevated BNP, persistent right lower lobe consolidation and an enlarging right upper lobe mass by CT scan of the neck:  The patient most likely has primary lung cancer versus metastatic disease to the chest from an unknown primary, in association with persistent right lower lobe consolidation, superimposed on a mild COPD exacerbation and congestive heart failure.  The significance of the patient's persistent right lower lobe consolidation is uncertain; atypical infection or underlying malignancy leading to postobstructive pneumonia, cannot be excluded.  Discussed CODE STATUS with the patient who  states that she desires to change her CODE STATUS from DNR-comfort care to DNR-Comfort Care Arrest; she is willing to undergo needle biopsy of her right upper lobe mass, but is refusing a bronchoscopy.    Recommend:  1.  Continue prednisone 40 mg a day, ceftriaxone and Zithromax.  2.  Begin DuoNeb aerosols every 6 hours.  3.  Keep oxygen saturation approximately 92 to 95%.  4.  Incentive spirometry and Acapella treatment.  5.  Sputum culture for bacteria, fungus, AFB and cytology.  6.  QuantiFERON gold test.  7.  [id number] of the patient's right upper lobe pulmonary mass as an outpatient, after discharge.    Mario Seay MD

## 2023-10-03 NOTE — CARE PLAN
Problem: Fall/Injury  Goal: Not fall by end of shift  10/3/2023 1134 by Steve Pichardo RN  Outcome: Progressing  10/3/2023 1127 by Steve Pichardo RN  Outcome: Progressing  Goal: Be free from injury by end of the shift  Outcome: Progressing   The patient's goals for the shift include pain control    The clinical goals for the shift include Manage pain    Over the shift, the patient did not make progress toward the following goals. Barriers to progression include na. Recommendations to address these barriers include na.

## 2023-10-03 NOTE — PROGRESS NOTES
"Subjective   Feels \"okay\".  Denies shortness of breath, but does admit to dyspnea on exertion, back, leg and hip pain and an occasional cough productive of light brown sputum.  On 3 L nasal cannula oxygen.  CT scan of the chest yesterday showed COPD changes, bronchial wall thickening with mild volume loss of the right lower lobe with some compressive atelectasis due to an elevated right hemidiaphragm and a right upper lobe paraspinal (possibly) cavitated mass (2.4 x 2.4 cm), increased in size from a prior CT scan of the chest from April 20, 2022.     Objective   Physical Exam  Vitals:  Blood pressure 170/90, pulse 79, temperature 36.6 °C (97.9 °F), temperature source Oral, resp. rate 17, height 1.6 m (5' 3\"), weight 45.2 kg (99 lb 10.4 oz), SpO2 98 %.    General-awake, alert and in no acute distress; on 3 L nasal cannula oxygen.  Lungs-faint expiratory wheezes bilaterally without rales or rhonchi.  Heart-regular rate and rhythm.  Abdomen-positive bowel sounds.  Extremities-no edema.  Skin-no rashes.    Scheduled medications  methylPREDNISolone sodium succinate (PF), 40 mg, intravenous, q12h      Continuous medications     PRN medications  PRN medications: oxyCODONE     Results for orders placed or performed during the hospital encounter of 10/01/23 (from the past 24 hour(s))   Basic metabolic panel   Result Value Ref Range    Glucose 118 (H) 74 - 99 mg/dL    Sodium 137 136 - 145 mmol/L    Potassium 4.1 3.5 - 5.3 mmol/L    Chloride 97 (L) 98 - 107 mmol/L    Bicarbonate 31 21 - 32 mmol/L    Anion Gap 13 10 - 20 mmol/L    Urea Nitrogen 22 6 - 23 mg/dL    Creatinine 1.00 0.50 - 1.05 mg/dL    eGFR 57 (L) >60 mL/min/1.73m*2    Calcium 8.8 8.6 - 10.3 mg/dL   CBC   Result Value Ref Range    WBC 10.3 4.4 - 11.3 x10*3/uL    nRBC 0.0 0.0 - 0.0 /100 WBCs    RBC 2.87 (L) 4.00 - 5.20 x10*6/uL    Hemoglobin 8.7 (L) 12.0 - 16.0 g/dL    Hematocrit 27.0 (L) 36.0 - 46.0 %    MCV 94 80 - 100 fL    MCH 30.3 26.0 - 34.0 pg    MCHC 32.2 " 32.0 - 36.0 g/dL    RDW 14.5 11.5 - 14.5 %    Platelets 387 150 - 450 x10*3/uL    MPV 9.3 7.5 - 11.5 fL        Assessment:  82-year-old woman with a history of hypertension, GERD, severe COPD, chronic hypoxia, right lower lobe pneumonia, abdominal aortic aneurysm, coronary artery disease, congestive heart failure, depression/anxiety, benign breast lesion, fibromyalgia, irritable bowel syndrome, aortic stenosis, pelvic fracture, falls, peripheral vascular disease, left total hip replacement, spinal stenosis, COVID-19 pneumonia, pulmonary hypertension and respiratory failure, admitted with shortness of breath, cough and wheezing, and found to have a COPD exacerbation and an incidental finding of a 2.4 x 2.4 cm right upper lobe (possibly) cavitated right upper mass, of uncertain etiology.  The patient has a history of exposure to active tuberculosis in her father, but states that she has had negative PPDs in the past.  The patient most likely has primary lung cancer versus metastatic malignancy from an unknown primary.  Atypical infection (TB, fungus) is less likely.  She has persistent mild bronchospasm today.  Of note, the patient's daughter states that the patient's  is being followed by pulmonary medicine (Dr. Reena Harley) for probable recurrent lung cancer.    Recommend:  1.  Continue Solu-Medrol.  2.  Begin DuoNeb aerosols every 6 hours.  3.  Check sputum culture for bacteria and AFB, and cytology--pending.  4.  Incentive spirometry and Acapella treatment.  5.  Check T spot to rule out TB--pending.  6.  The patient should follow-up with pulmonary medicine (Dr. Reena Harley) for pulmonary function tests, PET scan and outpatient [id number] of the patient's right upper lobe pulmonary mass.    Discussed with the patient and the patient's daughter.    Mario Seay MD

## 2023-10-03 NOTE — CARE PLAN
Problem: Skin  Goal: Prevent/manage excess moisture  Outcome: Progressing  Goal: Prevent/minimize sheer/friction injuries  Outcome: Progressing   The patient's goals for the shift include pain control    The clinical goals for the shift include Manage pain    Over the shift, the patient did not make progress toward the following goals. Barriers to progression include na. Recommendations to address these barriers include na.     [Negative] : Heme/Lymph [FreeTextEntry9] : low back pain

## 2023-10-03 NOTE — CARE PLAN
The patient's goals for the shift include pain control    The clinical goals for the shift include Manage Pain    Over the shift, the patient did not make progress toward the following goals. Barriers to progression include ***. Recommendations to address these barriers include ***.

## 2023-10-03 NOTE — CARE PLAN
The patient's goals for the shift include pain control    The clinical goals for the shift include Manage Pain    Over the shift, the patient did not make progress toward the following goals. Barriers to progression include NA. Recommendations to address these barriers include NA.

## 2023-10-03 NOTE — PROGRESS NOTES
Norah Cleaning is a 82 y.o. female on day 2 of admission presenting with Respiratory failure (CMS/[facility]).    Patient continues treatment for COPD exacerbation, Pulm following. Will return to [facility] when medically stable.    Gayatri Morrison RN

## 2023-10-04 NOTE — PROGRESS NOTES
Norah Cleaning is a 82 y.o. female on day 3 of admission presenting with Respiratory failure (CMS/HCC).      Subjective    Sob has improved, denies fever and chills.        Objective     Last Recorded Vitals  /72   Pulse 88   Temp 36.8 °C (98.3 °F) (Temporal)   Resp 16   Wt 45.2 kg (99 lb 10.4 oz)   SpO2 97%   Intake/Output last 3 Shifts:  No intake or output data in the 24 hours ending 10/04/23 1950      Admission Weight  Weight: 45.4 kg (100 lb) (10/01/23 1040)    Daily Weight  10/02/23 : 45.2 kg (99 lb 10.4 oz)    Image Results  CT chest w IV contrast  Narrative: Interpreted By:  Travis Calderon,   STUDY:  CT CHEST W IV CONTRAST;  10/2/2023 4:25 pm      INDICATION:  83 y/o   F with  Signs/Symptoms:rul mass.      LIMITATIONS:  None.      ACCESSION NUMBER(S):  [id number]      ORDERING CLINICIAN:  MOLLY ADAMES      TECHNIQUE:  After the administration of IV nonionic contrast, thin-section images  were obtained  from the thoracic inlet down through the diaphragm.  Sagittal and coronal reconstruction images were generated.  Mediastinal, lung, bone, and liver windows were reviewed.   IV  contrast agent was Omnipaque 350, 75 mL      COMPARISON:  CT scan chest from 02/04/2022 and CT scan chest from 04/20/2022.  correlation with chest x-ray from 10/01/2023.      FINDINGS:  CHEST WALL/BASE OF THE NECK:  The chest wall was grossly intact.  No thyromegaly or thyroid mass.      LUNGS/ PLEURA/ AND TRACHEA:  Underlying emphysematous changes in the lungs. There is mild  persistent infiltrative density with some associated bronchial wall  thickening but also some volume loss indicating complicating  atelectasis, in the right lower lobe. The findings are improved from  the previous exams. There is mild new atelectasis in the right middle  lobe anteromedially. There is a lobulated mass abutting against the  paraspinal pleural surface in the posteromedial right upper lobe,  with a small central cavitation. This mass  measures 24 x 24 mm today,  increased from 12 mm maximal diameter on 04/20/2022. Punctate pleural  based nodule in the mid anterolateral left upper lobe on image 116,  unchanged. Small new area linear density in the anteromedial left  upper lobe centered on axial image 166. Small stable area parenchymal  density in the paraspinal left lower lobe centered on image 237. No  pleural effusion. No pneumothorax.  The trachea was grossly intact.      MEDIASTINUM/KATIE:  Mild but suspicious central mediastinal adenopathy measuring up to 11  x 24 mm on axial image 17. No suspicious left hilar adenopathy. There  is some ill-defined soft tissue in the lower aspect of the right  hilum related to the chronic atelectasis/pneumonia in the right lower  lobe described above. No other adenopathy in the right hilum. No  axillary adenopathy. Mild cardiomegaly. Advanced four-vessel coronary  artery calcifications. No pericardial effusion.  Advanced mural calcifications throughout the thoracic aorta. No  thoracic aortic aneurysm or dissection. No central pulmonary artery  filling defect.      BONES:  No destructive lytic or blastic bone lesion. Mild accentuation of  distal thoracic kyphosis. Scattered mild endplate osteophytosis. Mild  stable loss of height at T3. Prominent stable compression fracture at  T7. Moderate stable compression fracture at T10. Stable superior  endplate Schmorl's node at T11 with slight stable anterior wedging at  T11. Moderate compression fracture with anterior wedging at T12,  unchanged. Mild stable superior endplate compression fracture with  anterior wedging at L1. The sternum is intact.      UPPER ABDOMEN:  Small hiatal hernia. Multiple bilateral renal cysts. The largest on  the right measures 32 x 22 mm and the largest on the left measures 47  x 24 mm. Advanced mural calcifications throughout the proximal  abdominal aorta. There are diffuse bilateral renal artery  calcifications, diffuse splenic artery  calcifications, and mild  moderate hepatic artery and SMA calcifications. Otherwise, the imaged  upper abdomen was grossly intact.      Impression: Underlying emphysema. Small area of stable pleural and parenchymal  density in the paraspinal left lower lobe, most likely scarring. Mild  new atelectasis in the anteromedial right middle lobe and new area  atelectasis or scarring in the mid anteromedial left upper lobe.      Persistent infiltrative densities in the mid to lower right lower  lobe, most likely scarring and atelectasis. Chronic residual  component of previous pneumonia is not excluded.      Increase in size of suspicious cavitary nodule in the paraspinal  right upper lobe. Cavitary primary lung carcinoma (squamous cell  carcinoma) to be excluded. Metastatic lesion from an unknown primary  would be a differential consideration. Cavitary infection is  considered less likely.      Mild but suspicious central mediastinal adenopathy. Consider PET-CT  scan in follow-up.      Cardiomegaly. Advanced four-vessel coronary artery calcifications.      Advanced mural calcifications throughout the thoracic aorta and  imaged abdominal aorta. No thoracic or abdominal aortic aneurysm in  this exam. There are additional calcifications in the abdominal  vessels as described.      Small hiatal hernia.      Bilateral renal cysts.      Multiple stable spinal compression fractures as described.      MACRO:  None      Signed by: Travis Calderon 10/2/2023 4:52 PM  Dictation workstation:   [id number]  CT soft tissue neck w IV contrast  Narrative: Interpreted By:  Jaron Carlson,   STUDY:  CT SOFT TISSUE NECK W IV CONTRAST;  10/2/2023 2:09 am      INDICATION:  Signs/Symptoms:narrowing of trachea difficulty swallowing.      COMPARISON:  None.      ACCESSION NUMBER(S):  [id number]      ORDERING CLINICIAN:  MOLLY ADAMES      TECHNIQUE:  Following intravenous injection  axial CT was performed from the  skullbase to the thoracic inlet  and multiplanar reconstructions were  made.      FINDINGS:  *  The visualized paranasal sinuses nasopharynx and oropharynx are  unremarkable. *The mandible, maxilla and temporomandibular joints are  normal. *The major salivary glands are normal.  *There is no measurable cervical lymphadenopathy.  *The larynx and related cartilages are normal.  *The thyroid gland is normal.  *The thoracic inlet is normal.  *The airway and visualized esophagus are normal  *There are abundant calcifications of the thoracic aorta and the  great vessels. There is marked dilatation to a proximally 2 cm  maximum diameter, at the left carotid bifurcation with peripheral  calcification. There is no evidence of intraluminal thrombus *There  is a 2 cm mass with central necrosis or emphysematous bulla at the  right pulmonary apex that can not be further characterized. This  nodule has increased in size compared to the previous examination  dated April 2022 neoplasm not excluded.      Impression: * There is no evidence of mass, cyst or adenopathy in the neck.  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 Notification  was initiated on 10/2/2023 at 7:19 am by  Jaron Carlson.  (**-OCF-**) Instructions:          THIS EXAMINATION WAS INTERPRETED AT [facility]      Signed by: Jaron Carlson 10/2/2023 7:21 AM  Dictation workstation:   [id number]      Physical Exam  Vitals reviewed.   Constitutional:       Appearance: Normal appearance.   HENT:      Head: Normocephalic.      Nose: Nose normal.      Mouth/Throat:      Mouth: Mucous membranes are moist.      Pharynx: Oropharynx is clear.     Cardiovascular:      Rate and Rhythm: Normal rate and regular rhythm.   Pulmonary:      Effort: Pulmonary effort is normal.      Breath sounds: faint wheeze in bilateral upper lobes   Abdominal:      General: Abdomen is flat. Bowel sounds are normal.      Palpations:  Abdomen is soft.   Musculoskeletal:         General: Normal range of motion.      Cervical back: Normal range of motion and neck supple.   Skin:     General: Skin is warm and dry.   Neurological:      General: No focal deficit present.      Mental Status: She is alert.   Psychiatric:         Mood and Affect: Mood normal.         Behavior: Behavior normal.         Thought Content: Thought content normal.         Judgment: Judgment normal.      Relevant Results  Assessment/Plan        Acute on chronic hypoxemic respiratory failure-likely secondary to pneumonia wiill continue IV Zosyn, stopped duoneb treatment, Solu-Medrol 40 mg IV twice a day will switch to prednisone 40 mg daily.    CT neck showed rul mass- IR consulted will attempt to get  CT-guided FNA of the patient's right upper lobe pulmonary nodule if they can fit her on schedule, if not she will still need to get this outpatient   Anticipate dc tomorrow        ct chest with contrast-. Underlying emphysema. Small area of stable pleural and parenchymal density in the paraspinal left lower lobe, most likely scarring. Mild new atelectasis in the anteromedial right middle lobe and new area atelectasis or scarring in the mid anteromedial left upper lobe.      Persistent infiltrative densities in the mid to lower right lower  lobe, most likely scarring and atelectasis. Chronic residual  component of previous pneumonia is not excluded.      Increase in size of suspicious cavitary nodule in the paraspinal  right upper lobe. Cavitary primary lung carcinoma (squamous cell  carcinoma) to be excluded. Metastatic lesion from an unknown primary  would be a differential consideration. Cavitary infection is  considered less likely.      Mild but suspicious central mediastinal adenopathy. Consider PET-CT  scan in follow-up.      Cardiomegaly. Advanced four-vessel coronary artery calcifications.      Advanced mural calcifications throughout the thoracic aorta and  imaged  abdominal aorta. No thoracic or abdominal aortic aneurysm in  this exam. There are additional calcifications in the abdominal  vessels as described.      Small hiatal hernia.      Bilateral renal cysts.      Multiple stable spinal compression fractures as described.           COPD exacerbation- continue prednisone      History of chronic congestive heart failure-patient is euvolemic.  On chest x-ray she was noted to have prominent interstitial markings.  Her BNP is chronically elevated and is currently 700.                   Principal Problem:    Respiratory failure (CMS/[facility])                  Suleiman Miller, APRN-CNP

## 2023-10-04 NOTE — PROGRESS NOTES
Norah Cleaning is a 82 y.o. female on day 2 of admission presenting with Respiratory failure (CMS/HCC).      Subjective    Sob has improved, denies fever and chills.        Objective     Last Recorded Vitals  /68 (BP Location: Left arm, Patient Position: Lying)   Pulse 69   Temp 36.7 °C (98 °F) (Temporal)   Resp 20   Wt 45.2 kg (99 lb 10.4 oz)   SpO2 99%   Intake/Output last 3 Shifts:    Intake/Output Summary (Last 24 hours) at 10/3/2023 2051  Last data filed at 10/3/2023 1015  Gross per 24 hour   Intake 420 ml   Output 300 ml   Net 120 ml       Admission Weight  Weight: 45.4 kg (100 lb) (10/01/23 1040)    Daily Weight  10/02/23 : 45.2 kg (99 lb 10.4 oz)    Image Results  CT chest w IV contrast  Narrative: Interpreted By:  Travis Calderon,   STUDY:  CT CHEST W IV CONTRAST;  10/2/2023 4:25 pm      INDICATION:  81 y/o   F with  Signs/Symptoms:rul mass.      LIMITATIONS:  None.      ACCESSION NUMBER(S):  [id number]      ORDERING CLINICIAN:  MOLLY ADAMES      TECHNIQUE:  After the administration of IV nonionic contrast, thin-section images  were obtained  from the thoracic inlet down through the diaphragm.  Sagittal and coronal reconstruction images were generated.  Mediastinal, lung, bone, and liver windows were reviewed.   IV  contrast agent was Omnipaque 350, 75 mL      COMPARISON:  CT scan chest from 02/04/2022 and CT scan chest from 04/20/2022.  correlation with chest x-ray from 10/01/2023.      FINDINGS:  CHEST WALL/BASE OF THE NECK:  The chest wall was grossly intact.  No thyromegaly or thyroid mass.      LUNGS/ PLEURA/ AND TRACHEA:  Underlying emphysematous changes in the lungs. There is mild  persistent infiltrative density with some associated bronchial wall  thickening but also some volume loss indicating complicating  atelectasis, in the right lower lobe. The findings are improved from  the previous exams. There is mild new atelectasis in the right middle  lobe anteromedially. There is a  lobulated mass abutting against the  paraspinal pleural surface in the posteromedial right upper lobe,  with a small central cavitation. This mass measures 24 x 24 mm today,  increased from 12 mm maximal diameter on 04/20/2022. Punctate pleural  based nodule in the mid anterolateral left upper lobe on image 116,  unchanged. Small new area linear density in the anteromedial left  upper lobe centered on axial image 166. Small stable area parenchymal  density in the paraspinal left lower lobe centered on image 237. No  pleural effusion. No pneumothorax.  The trachea was grossly intact.      MEDIASTINUM/KATIE:  Mild but suspicious central mediastinal adenopathy measuring up to 11  x 24 mm on axial image 17. No suspicious left hilar adenopathy. There  is some ill-defined soft tissue in the lower aspect of the right  hilum related to the chronic atelectasis/pneumonia in the right lower  lobe described above. No other adenopathy in the right hilum. No  axillary adenopathy. Mild cardiomegaly. Advanced four-vessel coronary  artery calcifications. No pericardial effusion.  Advanced mural calcifications throughout the thoracic aorta. No  thoracic aortic aneurysm or dissection. No central pulmonary artery  filling defect.      BONES:  No destructive lytic or blastic bone lesion. Mild accentuation of  distal thoracic kyphosis. Scattered mild endplate osteophytosis. Mild  stable loss of height at T3. Prominent stable compression fracture at  T7. Moderate stable compression fracture at T10. Stable superior  endplate Schmorl's node at T11 with slight stable anterior wedging at  T11. Moderate compression fracture with anterior wedging at T12,  unchanged. Mild stable superior endplate compression fracture with  anterior wedging at L1. The sternum is intact.      UPPER ABDOMEN:  Small hiatal hernia. Multiple bilateral renal cysts. The largest on  the right measures 32 x 22 mm and the largest on the left measures 47  x 24 mm. Advanced  mural calcifications throughout the proximal  abdominal aorta. There are diffuse bilateral renal artery  calcifications, diffuse splenic artery calcifications, and mild  moderate hepatic artery and SMA calcifications. Otherwise, the imaged  upper abdomen was grossly intact.      Impression: Underlying emphysema. Small area of stable pleural and parenchymal  density in the paraspinal left lower lobe, most likely scarring. Mild  new atelectasis in the anteromedial right middle lobe and new area  atelectasis or scarring in the mid anteromedial left upper lobe.      Persistent infiltrative densities in the mid to lower right lower  lobe, most likely scarring and atelectasis. Chronic residual  component of previous pneumonia is not excluded.      Increase in size of suspicious cavitary nodule in the paraspinal  right upper lobe. Cavitary primary lung carcinoma (squamous cell  carcinoma) to be excluded. Metastatic lesion from an unknown primary  would be a differential consideration. Cavitary infection is  considered less likely.      Mild but suspicious central mediastinal adenopathy. Consider PET-CT  scan in follow-up.      Cardiomegaly. Advanced four-vessel coronary artery calcifications.      Advanced mural calcifications throughout the thoracic aorta and  imaged abdominal aorta. No thoracic or abdominal aortic aneurysm in  this exam. There are additional calcifications in the abdominal  vessels as described.      Small hiatal hernia.      Bilateral renal cysts.      Multiple stable spinal compression fractures as described.      MACRO:  None      Signed by: Travis Calderon 10/2/2023 4:52 PM  Dictation workstation:   [id number]  CT soft tissue neck w IV contrast  Narrative: Interpreted By:  Jaron Carlson,   STUDY:  CT SOFT TISSUE NECK W IV CONTRAST;  10/2/2023 2:09 am      INDICATION:  Signs/Symptoms:narrowing of trachea difficulty swallowing.      COMPARISON:  None.      ACCESSION NUMBER(S):  [id number]       ORDERING CLINICIAN:  MOLLY ADAMES      TECHNIQUE:  Following intravenous injection  axial CT was performed from the  skullbase to the thoracic inlet and multiplanar reconstructions were  made.      FINDINGS:  *  The visualized paranasal sinuses nasopharynx and oropharynx are  unremarkable. *The mandible, maxilla and temporomandibular joints are  normal. *The major salivary glands are normal.  *There is no measurable cervical lymphadenopathy.  *The larynx and related cartilages are normal.  *The thyroid gland is normal.  *The thoracic inlet is normal.  *The airway and visualized esophagus are normal  *There are abundant calcifications of the thoracic aorta and the  great vessels. There is marked dilatation to a proximally 2 cm  maximum diameter, at the left carotid bifurcation with peripheral  calcification. There is no evidence of intraluminal thrombus *There  is a 2 cm mass with central necrosis or emphysematous bulla at the  right pulmonary apex that can not be further characterized. This  nodule has increased in size compared to the previous examination  dated April 2022 neoplasm not excluded.      Impression: * There is no evidence of mass, cyst or adenopathy in the neck.  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 Notification  was initiated on 10/2/2023 at 7:19 am by  Jaron Carlson.  (**-OCF-**) Instructions:          THIS EXAMINATION WAS INTERPRETED AT [facility] [place]      Signed by: Jaron Carlson 10/2/2023 7:21 AM  Dictation workstation:   [id number]      Physical Exam  Vitals reviewed.   Constitutional:       Appearance: Normal appearance.   HENT:      Head: Normocephalic.      Nose: Nose normal.      Mouth/Throat:      Mouth: Mucous membranes are moist.      Pharynx: Oropharynx is clear.     Cardiovascular:      Rate and Rhythm: Normal rate and regular rhythm.   Pulmonary:      Effort: Pulmonary effort is  normal.      Breath sounds: Clear   Abdominal:      General: Abdomen is flat. Bowel sounds are normal.      Palpations: Abdomen is soft.   Musculoskeletal:         General: Normal range of motion.      Cervical back: Normal range of motion and neck supple.   Skin:     General: Skin is warm and dry.   Neurological:      General: No focal deficit present.      Mental Status: She is alert.   Psychiatric:         Mood and Affect: Mood normal.         Behavior: Behavior normal.         Thought Content: Thought content normal.         Judgment: Judgment normal.      Relevant Results  Assessment/Plan        Acute on chronic hypoxemic respiratory failure-likely secondary to pneumonia wiill continue emperic IV Zosyn, duo nebulizer treatment, Solu-Medrol 40 mg IV twice a day.  CT neck showed rul mass.   ct chest with contrast-. Underlying emphysema. Small area of stable pleural and parenchymal  density in the paraspinal left lower lobe, most likely scarring. Mild  new atelectasis in the anteromedial right middle lobe and new area  atelectasis or scarring in the mid anteromedial left upper lobe.      Persistent infiltrative densities in the mid to lower right lower  lobe, most likely scarring and atelectasis. Chronic residual  component of previous pneumonia is not excluded.      Increase in size of suspicious cavitary nodule in the paraspinal  right upper lobe. Cavitary primary lung carcinoma (squamous cell  carcinoma) to be excluded. Metastatic lesion from an unknown primary  would be a differential consideration. Cavitary infection is  considered less likely.      Mild but suspicious central mediastinal adenopathy. Consider PET-CT  scan in follow-up.      Cardiomegaly. Advanced four-vessel coronary artery calcifications.      Advanced mural calcifications throughout the thoracic aorta and  imaged abdominal aorta. No thoracic or abdominal aortic aneurysm in  this exam. There are additional calcifications in the  abdominal  vessels as described.      Small hiatal hernia.      Bilateral renal cysts.      Multiple stable spinal compression fractures as described.              chronic COPD exacerbation-no exacerbation. Suspect symptoms secondary to post  pneumonia, and increase in lung mass.      History of chronic congestive heart failure-patient is euvolemic.  On chest x-ray she was noted to have prominent interstitial markings.  Her BNP is chronically elevated and is currently 700.                   Principal Problem:    Respiratory failure (CMS/[facility])                  Suleiman Miller, APRN-CNP

## 2023-10-04 NOTE — PROGRESS NOTES
"  Subjective   Feels \"okay\".  Denies shortness of breath but does admit to dyspnea on exertion, cough productive of yellow sputum and to pain in her back, legs and hips.  On 3 L nasal cannula oxygen.     Objective   Physical Exam:    Vitals:  Blood pressure (!) 192/86, pulse 72, temperature 36.8 °C (98.3 °F), temperature source Temporal, resp. rate 16, height 1.6 m (5' 3\"), weight 45.2 kg (99 lb 10.4 oz), SpO2 98 %.    General-awake, alert and in no acute distress.  Lungs-faint wheezes bilaterally without rales or rhonchi.  Heart-regular rate and rhythm.  Abdomen-positive bowel sounds.  Extremities-no edema.  Skin-no rashes.    Scheduled medications  ipratropium-albuteroL, 3 mL, nebulization, TID      Continuous medications     PRN medications  PRN medications: ipratropium-albuteroL, oxyCODONE     Results for orders placed or performed during the hospital encounter of 10/01/23 (from the past 24 hour(s))   Basic metabolic panel   Result Value Ref Range    Glucose 127 (H) 74 - 99 mg/dL    Sodium 138 136 - 145 mmol/L    Potassium 4.3 3.5 - 5.3 mmol/L    Chloride 98 98 - 107 mmol/L    Bicarbonate 32 21 - 32 mmol/L    Anion Gap 12 10 - 20 mmol/L    Urea Nitrogen 21 6 - 23 mg/dL    Creatinine 0.96 0.50 - 1.05 mg/dL    eGFR 60 (L) >60 mL/min/1.73m*2    Calcium 8.5 (L) 8.6 - 10.3 mg/dL   CBC   Result Value Ref Range    WBC 8.3 4.4 - 11.3 x10*3/uL    nRBC 0.0 0.0 - 0.0 /100 WBCs    RBC 2.88 (L) 4.00 - 5.20 x10*6/uL    Hemoglobin 8.7 (L) 12.0 - 16.0 g/dL    Hematocrit 27.6 (L) 36.0 - 46.0 %    MCV 96 80 - 100 fL    MCH 30.2 26.0 - 34.0 pg    MCHC 31.5 (L) 32.0 - 36.0 g/dL    RDW 14.4 11.5 - 14.5 %    Platelets 366 150 - 450 x10*3/uL    MPV 9.4 7.5 - 11.5 fL     Assessment:  82-year-old woman with a history of severe COPD, chronic hypoxia and resolved right lower lobe pneumonia, admitted with a COPD exacerbation and found to have a 2.4 x 2.4 right upper lobe possibly cavitated pulmonary nodule of uncertain etiology.  The " patient's nodule most likely represents primary lung cancer.  However, metastatic disease from an unknown primary or atypical infection (fungus, AFB) cannot be excluded.  The patient has persistent mild bronchospasm, but overall appears to be clinically stable.    Recommend:  1.  Continue current medications.  2.  Check sputum culture for bacteria and AFB, and cytology-pending.  3.  Incentive spirometry and Acapella treatment.  4.  Check T spot-pending.  5.  Would ask IR if they are willing to perform a CT-guided FNA of the patient's right upper lobe pulmonary nodule as an inpatient.  6.  The patient should follow-up with pulmonary medicine (Dr. Reena Harley) after discharge for pulmonary function tests, a PET scan and outpatient CT-guided FNA of the patient's right upper lobe nodule if IR is unable to perform the procedure.    Discussed with the patient and the patient's daughter today.    Mario Seay MD

## 2023-10-05 NOTE — CARE PLAN
Met with family at bedside, they state LTC facility is not ready for patient to return are requesting dc tomorrow morning and were also told that dc would be tomorrow by another provider. Paul WALLS is on board with this plan and Vidya [facility] has been notified of dc tomorrow along with son Dexter.

## 2023-10-05 NOTE — POST-PROCEDURE NOTE
Interventional Radiology Brief Postprocedure Note    Attending: Marcell Willingham MD     Assistant: none    Diagnosis: right lung nodule    Description of procedure: core bx of the right lung nodule-5 passes with fragmented cores obtained.     Anesthesia:  Local    Complications: None    Estimated Blood Loss: minimal    Medications  As of 10/05/23 1633      ipratropium-albuteroL (Duo-Neb) 0.5-2.5 mg/3 mL nebulizer solution 3 mL (mL) Total volume:  9 mL Dosing weight:  45.4      Date/Time Rate/Dose/Volume Action       10/01/23  1137 3 mL Given      1147 3 mL Given      1157 3 mL Given               ipratropium-albuteroL (Duo-Neb) 0.5-2.5 mg/3 mL nebulizer solution 3 mL (mL) Total volume:  3 mL Dosing weight:  45.2      Date/Time Rate/Dose/Volume Action       10/03/23  1455 3 mL Given               ipratropium-albuteroL (Duo-Neb) 0.5-2.5 mg/3 mL nebulizer solution 3 mL (mL) Total volume:  12 mL* Dosing weight:  45.2   *Administration not included in total     Date/Time Rate/Dose/Volume Action       10/03/23  2047 3 mL Given     10/04/23  0730 3 mL Given      1400 *3 mL Missed      1925 3 mL Given     10/05/23  0727 3 mL Given      1400 *3 mL Missed               predniSONE (Deltasone) tablet 40 mg (mg) Total dose:  40 mg Dosing weight:  45.4      Date/Time Rate/Dose/Volume Action       10/01/23  1410 40 mg Given               predniSONE (Deltasone) tablet 40 mg (mg) Total dose:  40 mg Dosing weight:  45.2      Date/Time Rate/Dose/Volume Action       10/05/23  0834 40 mg Given               cefTRIAXone (Rocephin) in dextrose 5 % water (D5W) 50 mL IV 2 g (mL/hr) Total volume:  Not documented* Dosing weight:  45.4   *Total volume has not been documented. View each administration to see the amount administered.     Date/Time Rate/Dose/Volume Action       10/01/23  1410 2 g - 100 mL/hr (over 30 min) New Bag      1440  (over 30 min) Stopped               azithromycin (Zithromax) in dextrose 5 % in water (D5W) 250 mL  mg  (mL/hr) Total volume:  Not documented* Dosing weight:  45.4   *Total volume has not been documented. View each administration to see the amount administered.     Date/Time Rate/Dose/Volume Action       10/01/23  1410 500 mg - 250 mL/hr (over 60 min) New Bag               morphine injection 2 mg (mg) Total dose:  2 mg Dosing weight:  45.4      Date/Time Rate/Dose/Volume Action       10/01/23  1518 2 mg Given               methylPREDNISolone sod succinate (PF) (SOLU-Medrol) 40 mg/mL injection 40 mg (mg) Total dose:  240 mg Dosing weight:  45.4      Date/Time Rate/Dose/Volume Action       10/02/23  0025 40 mg Given      0927 40 mg Given      2050 40 mg Given     10/03/23  0920 40 mg Given      2056 40 mg Given     10/04/23  0824 40 mg Given               oxyCODONE (Roxicodone) immediate release tablet 5 mg (mg) Total dose:  95 mg Dosing weight:  45.4      Date/Time Rate/Dose/Volume Action       10/02/23  0025 5 mg Given      0443 5 mg Given      0929 5 mg Given      1342 5 mg Given      1741 5 mg Given      2353 5 mg Given     10/03/23  0420 5 mg Given      0919 5 mg Given      1350 5 mg Given      1808 5 mg Given      2242 5 mg Given     10/04/23  0304 5 mg Given      0823 5 mg Given      1301 5 mg Given      1720 5 mg Given      2122 5 mg Given     10/05/23  0122 5 mg Given      0637 5 mg Given      1038 5 mg Given               iohexol (OMNIPaque) 350 mg iodine/mL injection 75 mL (mL) Total volume:  75 mL Dosing weight:  45.4      Date/Time Rate/Dose/Volume Action       10/02/23  0210 75 mL Given               iohexol (OMNIPaque) 350 mg iodine/mL injection 75 mL (mL) Total volume:  75 mL Dosing weight:  45.2      Date/Time Rate/Dose/Volume Action       10/02/23  1619 75 mL Given               losartan (Cozaar) tablet 50 mg (mg) Total dose:  100 mg Dosing weight:  45.2      Date/Time Rate/Dose/Volume Action       10/04/23  1632 50 mg Given     10/05/23  0834 50 mg Given               metoprolol succinate XL  (Toprol-XL) 24 hr tablet 12.5 mg (mg) Total dose:  25 mg Dosing weight:  45.2      Date/Time Rate/Dose/Volume Action       10/04/23  1631 12.5 mg Given     10/05/23  0834 12.5 mg Given               hydrALAZINE (Apresoline) injection 5 mg (mg) Total dose:  5 mg Dosing weight:  45.2      Date/Time Rate/Dose/Volume Action       10/04/23  1810 5 mg Given               oxygen (O2) therapy (L/min) Total volume:  Not documented* Dosing weight:  45.2   *Total volume has not been documented. View each administration to see the amount administered.     Date/Time Rate/Dose/Volume Action       10/05/23  0727 2 L/min Start               fentaNYL (Sublimaze) injection (mcg) Total dose:  50 mcg      Date/Time Rate/Dose/Volume Action       10/05/23  1359 25 mcg Given      1420 25 mcg Given               midazolam (Versed) injection (mg) Total dose:  0.5 mg      Date/Time Rate/Dose/Volume Action       10/05/23  1400 0.5 mg Given               lidocaine with 8.4% sod bicarb (Buffered Xylocaine) 1 % (10 mL) injection (mL) Total volume:  10 mL      Date/Time Rate/Dose/Volume Action       10/05/23  1359 10 mL Given                   ID Type Source Tests Collected by Time   1 : right upper lung lobe Tissue LUNG BIOPSY RIGHT (SPECIFY SITE) SURGICAL PATHOLOGY EXAM Marcell Willingham MD 10/5/2023 1733         See detailed result report with images in PACS.    The patient tolerated the procedure well without incident or complication and is in stable condition.

## 2023-10-05 NOTE — PROGRESS NOTES
"  Subjective   Feels \"okay\".  Admits to mild shortness of breath and to back, hip and leg pain, but denies cough.  On 3 L nasal cannula oxygen.  For CT-guided FNA of the patient's right upper lobe nodular opacity by IR today.     Objective     Physical Exam  Vitals:  Blood pressure 162/71, pulse 65, temperature 36.8 °C (98.3 °F), temperature source Temporal, resp. rate 18, height 1.6 m (5' 3\"), weight 45.2 kg (99 lb 10.4 oz), SpO2 94 %.    General-awake, alert and in no acute distress.  Lungs-coarse breath sounds with scattered wheezes bilaterally without rales or rhonchi.  Heart-regular rate and rhythm; IV/VI ALEJANDRA with radiation to the right sternal border.  Abdomen-positive bowel sounds.  Extremities-no edema.  Skin-no rashes.    Scheduled medications  ipratropium-albuteroL, 3 mL, nebulization, TID  losartan, 50 mg, oral, Daily  metoprolol succinate XL, 12.5 mg, oral, Daily  predniSONE, 40 mg, oral, Daily      Continuous medications     PRN medications  PRN medications: ipratropium-albuteroL, oxyCODONE, oxygen     Results for orders placed or performed during the hospital encounter of 10/01/23 (from the past 24 hour(s))   Protime-INR   Result Value Ref Range    Protime 12.9 (H) 9.8 - 12.8 seconds    INR 1.1 0.9 - 1.1      Assessment:  82-year-old woman with severe COPD, admitted with shortness of breath, cough, wheezing and an elevated BNP, and found to have an enlarging right upper lobe possibly cavitating nodular opacity, most likely due to primary lung or metastatic malignancy, although atypical infection (AFB, fungus) cannot be excluded.  She also has an elevated and probably paralyzed right hemidiaphragm, possibly due to injury to the right recurrent laryngeal nerve from her right upper lobe nodular opacity.  She has mild bronchospasm at the present time.    Recommend:  1.  For CT-guided FNA of the patient's right upper lobe masslike lesion by IR today.  2.  Continue DuoNeb and prednisone.  3.  Incentive " spirometry.  4.  Keep oxygen saturation approximately 92 to 95%.  5.  DVT prophylaxis following her biopsy procedure.  6.  Follow-up with pulmonary medicine after discharge for PET scan and pulmonary function tests.    Mario Seay MD
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
## 2023-10-05 NOTE — PROGRESS NOTES
Norah Cleaning is a 82 y.o. female on day 4 of admission presenting with Respiratory failure (CMS/HCC).      Subjective    Sob continues to improve, denies fever and chills.        Objective     Last Recorded Vitals  /80   Pulse 65   Temp 36.8 °C (98.3 °F) (Temporal)   Resp 16   Wt 45.2 kg (99 lb 10.4 oz)   SpO2 96%   Intake/Output last 3 Shifts:  No intake or output data in the 24 hours ending 10/05/23 1705      Admission Weight  Weight: 45.4 kg (100 lb) (10/01/23 1040)    Daily Weight  10/02/23 : 45.2 kg (99 lb 10.4 oz)    Image Results  XR chest 1 view  Narrative: Interpreted By:  Travis Calderon,   STUDY:  XR CHEST 1 VIEW;  10/5/2023 4:16 pm      INDICATION:  Signs/Symptoms:s/p RUL lung biopsy.      COMPARISON:  Chest CT scan from 10/02/2023. Chest x-rays, most recent from  10/01/2023.      ACCESSION NUMBER(S):  [id number]      ORDERING CLINICIAN:  ZAN IRWIN      TECHNIQUE:  Single AP portable view of the chest was obtained.      FINDINGS:  MEDIASTINUM/ LUNGS/ KATIE:  Cardiomegaly without gross vascular congestion. No pleural effusion  on the left. Elevation of the right diaphragm. Mild persistent  pleural and parenchymal opacities at the right base. Stable  calcification in the aorta. Cavitary mass in the posterior paraspinal  mid right upper lobe on CT scan is not readily apparent on chest  x-ray. No pneumothorax.  No tracheal deviation.  No abnormal hilar fullness or gross mass on either side.  Stable surgical clips in the lower left neck.      BONES:  No lytic or blastic destructive bone lesion. Moderate right  glenohumeral joint space narrowing with spur formation.  There is  mild disc space narrowing and endplate osteophytosis throughout the  thoracic spine.      UPPER ABDOMEN:  Grossly intact.      Impression: Mid posteromedial right upper lobe cavitary mass on CT scan dated  10/02/2023 is not readily apparent on plain films. Patient had  percutaneous needle biopsy under CT guidance of  this mass earlier  this afternoon. No pneumothorax.      Stable underlying elevation of the right diaphragm.      Persistent pleural and parenchymal opacities at the right base could  be pleural and parenchymal scarring. A component pleural fluid with  pneumonia is not excluded.      Cardiomegaly without indication of CHF.      Signed by: Travis Calderon 10/5/2023 4:39 PM  Dictation workstation:   [id number]      Physical Exam  Vitals reviewed.   Constitutional:       Appearance: Normal appearance.   HENT:      Head: Normocephalic.      Nose: Nose normal.      Mouth/Throat:      Mouth: Mucous membranes are moist.      Pharynx: Oropharynx is clear.     Cardiovascular:      Rate and Rhythm: Normal rate and regular rhythm.   Pulmonary:      Effort: Pulmonary effort is normal.      Breath sounds: clear   Abdominal:      General: Abdomen is flat. Bowel sounds are normal.      Palpations: Abdomen is soft.   Musculoskeletal:         General: Normal range of motion.      Cervical back: Normal range of motion and neck supple.   Skin:     General: Skin is warm and dry.   Neurological:      General: No focal deficit present.      Mental Status: She is alert.   Psychiatric:         Mood and Affect: Mood normal.         Behavior: Behavior normal.         Thought Content: Thought content normal.         Judgment: Judgment normal.      Relevant Results  Assessment/Plan        Acute on chronic hypoxemic respiratory failure-likely secondary to pneumonia, zosyn stopped, patient afebrile, duoneb treatment, prednisone 40 mg daily.    COPD exacerbation- continue prednisone, plan to stop at discharge   CT neck showed rul mass- IR consulted got CT-guided FNA of the patient's right upper lobe pulmonary nodule today   Will monitor overnight, facility not ready to accept    Anticipate dc tomorrow  if continue to be stable  Will need Pulm OP follow up     HTN- home med's resumed     ct chest with contrast-. Underlying emphysema. Small area of  stable pleural and parenchymal density in the paraspinal left lower lobe, most likely scarring. Mild new atelectasis in the anteromedial right middle lobe and new area atelectasis or scarring in the mid anteromedial left upper lobe.      Persistent infiltrative densities in the mid to lower right lower  lobe, most likely scarring and atelectasis. Chronic residual  component of previous pneumonia is not excluded.      Increase in size of suspicious cavitary nodule in the paraspinal  right upper lobe. Cavitary primary lung carcinoma (squamous cell  carcinoma) to be excluded. Metastatic lesion from an unknown primary  would be a differential consideration. Cavitary infection is  considered less likely.      Mild but suspicious central mediastinal adenopathy. Consider PET-CT  scan in follow-up.      Cardiomegaly. Advanced four-vessel coronary artery calcifications.      Advanced mural calcifications throughout the thoracic aorta and  imaged abdominal aorta. No thoracic or abdominal aortic aneurysm in  this exam. There are additional calcifications in the abdominal  vessels as described.      Small hiatal hernia.      Bilateral renal cysts.      Multiple stable spinal compression fractures as described.           COPD exacerbation- continue prednisone, plan to stop at discharge      History of chronic congestive heart failure-patient is euvolemic.  On chest x-ray she was noted to have prominent interstitial markings.  Her BNP is chronically elevated and is currently 700.                   Principal Problem:    Respiratory failure (CMS/HCC)                  Suleiman Miller, JOIE-CNP

## 2023-10-06 NOTE — DISCHARGE SUMMARY
Discharge Diagnosis  Respiratory failure (CMS/HCC)    Issues Requiring Follow-Up  Please follow-up on lung biopsy results  Will need follow-up with Pulmonary medicine with 2-3 weeks of discharge  Will need follow-up with PCP on barium swallow study results     Discharge Meds     Your medication list        CONTINUE taking these medications        Instructions Last Dose Given Next Dose Due   acetaminophen 650 mg suppository  Commonly known as: Tylenol           acetaminophen 325 mg tablet  Commonly known as: Tylenol           acetaminophen 325 mg tablet  Commonly known as: Tylenol           albuterol 90 mcg/actuation inhaler           alendronate 70 mg tablet  Commonly known as: Fosamax           aspirin 81 mg chewable tablet           atorvastatin 40 mg tablet  Commonly known as: Lipitor           benzonatate 100 mg capsule  Commonly known as: Tessalon           cholecalciferol 50 MCG (2000 UT) tablet  Commonly known as: Vitamin D-3           diclofenac sodium 1 % gel gel  Commonly known as: Voltaren           Dulcolax (bisacodyl) 10 mg suppository  Generic drug: bisacodyl           DULoxetine 30 mg DR capsule  Commonly known as: Cymbalta           ferrous sulfate 325 (65 Fe) MG tablet           fluticasone propion-salmeteroL 500-50 mcg/dose diskus inhaler  Commonly known as: Advair Diskus           gabapentin 100 mg capsule  Commonly known as: Neurontin           guaiFENesin 600 mg 12 hr tablet  Commonly known as: Mucinex           ipratropium-albuteroL 0.5-2.5 mg/3 mL nebulizer solution  Commonly known as: Duo-Neb           Lactobacillus acidophilus 1 billion cell capsule           lidocaine 4 % patch           losartan 50 mg tablet  Commonly known as: Cozaar           magnesium hydroxide 400 mg/5 mL suspension  Commonly known as: Milk of Magnesia           melatonin 5 mg tablet           metoprolol succinate XL 25 mg 24 hr tablet  Commonly known as: Toprol-XL           mineral oil enema           montelukast 10  mg tablet  Commonly known as: Singulair           nicotine polacrilex 2 mg lozenge  Commonly known as: Commit           olopatadine 0.1 % ophthalmic solution  Commonly known as: Patanol           ondansetron 4 mg tablet  Commonly known as: Zofran           oxyCODONE-acetaminophen 5-325 mg tablet  Commonly known as: Percocet           pantoprazole 40 mg EC tablet  Commonly known as: ProtoNix           sennosides 8.6 mg tablet  Commonly known as: Senokot           sildenafil 25 mg tablet  Commonly known as: Viagra           Spiriva Respimat 2.5 mcg/actuation inhaler  Generic drug: tiotropium           torsemide 10 mg tablet  Commonly known as: Demadex                    Test Results Pending At Discharge  Pending Labs       Order Current Status    Basic Metabolic Panel In process    Blood Culture In process    CBC In process    Extra Tubes In process    Light Blue Top In process    Procalcitonin In process    Surgical Pathology Exam In process    Blood Culture Preliminary result            Hospital Course   Patient is a 83 YO F with history of COPD with chronic hypoxemic repiratory fialure on home oxygen who presented to the ED with progressive shortness of breath over the last several days. On arrival she also shared trouble with swallowing, and cough that was not improving. She did require 4 L of oxygen, which is higher than her baseline requirement of 3 L. Imaging did show concern for a lung mass. IR was consulted and patient did under go FNA of the mass. Her cultures have been negative. She is medically stable for discharge to her facility. She will require close follow-up with her biopsy and results with primary care and speciality care on discharge. She will also require follow-up of barium study results with her primary care physician.     Pertinent Physical Exam At Time of Discharge  Physical Exam  Vitals reviewed.         Outpatient Follow-Up  No future appointments.      Brett Torres MD

## 2023-10-06 NOTE — PROGRESS NOTES
"  Subjective   Feels \"okay\".  Admits to mild shortness of breath and to back pain, as well as an occasional cough productive of a small amount of yellow sputum.  On 3 to 4 L nasal cannula oxygen with an oxygen saturation of 97%.  T spot test is negative.  The patient underwent a CT-guided FNA of a right upper lobe pulmonary nodule by IR yesterday without complications.  Chest x-ray this morning shows no change in an elevated right hemidiaphragm with right basilar atelectasis, and no pneumothorax.     Objective   Physical Exam  Vitals:  Blood pressure 152/67, pulse 64, temperature 36.6 °C (97.9 °F), resp. rate 17, height 1.6 m (5' 3\"), weight 45.2 kg (99 lb 10.4 oz), SpO2 98 %.    General-awake, alert and in no acute distress.  Lungs-clear, without wheezes, rales or rhonchi.  Heart-regular rate and rhythm.  Abdomen-positive bowel sounds.  Extremities-no edema.  Skin-no rashes.    Scheduled medications  albuterol, 3 mL, nebulization, q6h  aspirin, 81 mg, oral, Daily  atorvastatin, 40 mg, oral, Nightly  cholecalciferol, 50 mcg, oral, BID  DULoxetine, 30 mg, oral, Daily  enoxaparin, 40 mg, subcutaneous, q24h JERSON  gabapentin, 200 mg, oral, TID  ipratropium-albuteroL, 3 mL, nebulization, TID  losartan, 50 mg, oral, Daily  metoprolol succinate XL, 12.5 mg, oral, Daily  montelukast, 10 mg, oral, Daily  oxygen, , inhalation, Continuous - 02/gases  pantoprazole, 40 mg, oral, Daily before breakfast  piperacillin-tazobactam, 3.375 g, intravenous, q6h  predniSONE, 40 mg, oral, Daily  sildenafil, 25 mg, oral, TID    Continuous medications     PRN medications  PRN medications: benzonatate, ipratropium-albuteroL, lidocaine with 8.4% sod bicarb, oxyCODONE, oxygen     Results for orders placed or performed during the hospital encounter of 10/01/23 (from the past 24 hour(s))   CBC   Result Value Ref Range    WBC 10.3 4.4 - 11.3 x10*3/uL    nRBC 0.0 0.0 - 0.0 /100 WBCs    RBC 3.02 (L) 4.00 - 5.20 x10*6/uL    Hemoglobin 8.9 (L) 12.0 - " 16.0 g/dL    Hematocrit 28.5 (L) 36.0 - 46.0 %    MCV 94 80 - 100 fL    MCH 29.5 26.0 - 34.0 pg    MCHC 31.2 (L) 32.0 - 36.0 g/dL    RDW 14.1 11.5 - 14.5 %    Platelets 382 150 - 450 x10*3/uL    MPV 9.1 7.5 - 11.5 fL   Basic Metabolic Panel   Result Value Ref Range    Glucose 80 74 - 99 mg/dL    Sodium 137 136 - 145 mmol/L    Potassium 4.0 3.5 - 5.3 mmol/L    Chloride 95 (L) 98 - 107 mmol/L    Bicarbonate 31 21 - 32 mmol/L    Anion Gap 15 10 - 20 mmol/L    Urea Nitrogen 17 6 - 23 mg/dL    Creatinine 0.94 0.50 - 1.05 mg/dL    eGFR 61 >60 mL/min/1.73m*2    Calcium 8.9 8.6 - 10.3 mg/dL      Chest x-ray today--as above.    Assessment:  82-year-old woman with COPD and chronic hypoxia, admitted with shortness of breath, cough, wheezing, normal white blood count and elevated BNP consistent with a COPD exacerbation with superimposed congestive heart failure, and incidentally found to have a right upper lobe cavitated pulmonary nodule of uncertain etiology.  Tuberculosis is unlikely given a negative T spot test.  The patient most likely has primary lung cancer or metastatic malignancy from an unknown primary.  There is no bronchospasm.    Recommend:  1.  Check CT-FNA results-pending.  2.  Incentive spirometry.  3.  Keep oxygen saturation approximately 92 to 95%.  3.  Continue DuoNeb, Singulair, benzonatate and Lovenox, but consider stopping Zosyn and sildenafil.  4.  Follow-up with pulmonary medicine after discharge.      Mario Seay MD

## 2023-10-06 NOTE — CARE PLAN
Patient has dc orders, will return to [facility], son made aware by VM, time was set for 10 and then changed to 5p d/t imaging needed. Son updated on dc time change via VM

## 2023-10-06 NOTE — PROGRESS NOTES
Speech-Language Pathology    Inpatient Speech-Language Pathology Clinical Swallow Evaluation    Patient Name: Norah Cleaning  MRN: [id number]  Today's Date: 10/6/2023   Time Calculation  Start Time: 1045  Stop Time: 1110  Time Calculation (min): 25 min         Current Problem:   Patient Active Problem List   Diagnosis    Weakness    Cough    Wheezing    Anemia    HTN (hypertension)    COPD (chronic obstructive pulmonary disease) (CMS/[facility])    Bronchitis    GERD (gastroesophageal reflux disease)    Depression    Osteoarthritis    Foul smelling urine    Respiratory failure (CMS/[facility])         Recommendations:  Risk for Aspiration: Yes  Additional Recommendations: Dysphagia treatment  Solid Diet Recommendations : Regular (IDDSI Level 7)  Liquid Diet Recommendations: Honey thick/liquidised/moderately thick (IDDS Level 3)  Compensatory Swallowing Strategies: Upright 90 degrees as possible for all oral intake, Remain upright for 20-30 minutes after meals, Alternate solids and liquids, No straws, Single sips, Small bites/sips  Medication Administration Recommendations: Whole, With Liquid, With Pureed      Assessment:     For complete details, please refer to the combined MBS report under the Results/Imaging section of the EMR.    Plan:  SLP Discharge Recommendations: Continue skilled SLP services at the next level of care    General Visit Information:  Patient Class: Inpatient  Past Medical History Relevant to Rehab: Pt reports having done MBS approx 7-8 years ago.  Patient Seen During This Visit: Yes  Current Diet : Cardiac      Oral/Motor Assessment:  Dentition: Edentulous      Consistencies Trialed:  Consistencies Trialed: Yes      Clinical Observations:  Patient Positioning: Upright in Chair

## 2023-10-06 NOTE — PROGRESS NOTES
"  Subjective   Feels \"okay\".  Admits to mild shortness of breath, back pain and and occasional cough productive of a small amount of yellow sputum.  Had a ML-yavfeh-YAE of a right upper lobe pulmonary nodule yesterday by IR.  Chest x-ray this morning shows     Objective   Physical Exam  Vitals:  Blood pressure 152/67, pulse 64, temperature 36.6 °C (97.9 °F), resp. rate 17, height 1.6 m (5' 3\"), weight 45.2 kg (99 lb 10.4 oz), SpO2 98 %.    Relevant Results            Mario Seay MD      "

## 2023-10-06 NOTE — NURSING NOTE
Daughter called and notified that pt discharged to [place].   Nursing facility called to give report, no answer at this time.

## 2023-10-09 PROBLEM — J18.9 PNEUMONIA OF BOTH LUNGS DUE TO INFECTIOUS ORGANISM: Status: ACTIVE | Noted: 2023-01-01
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
## 2023-10-10 NOTE — PROGRESS NOTES
"PROGRESS NOTE    Subjective   Chief complaint: Norah Cleaning is a 83 y.o. female who is a long term care patient, was recently readmitted for the hospital     HPI: pleasant and talkative. Is visiting with her daughter. Good historian. She reports that a mass was discovered in the upper lobe of  her right lung. Biopsy was done, results pending. 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 Remains on oxygen therapy. Has moist cough.   She reports that she was a previous smoker. Her daughter reported that her father (the resident's spouse) was also recently diagnosed with lung cancer.   HPI      Objective   Vital signs: 126/72- 98-24-93%, 3l/m    Physical Exam  Constitutional:       General: She is not in acute distress.  Eyes:      Extraocular Movements: Extraocular movements intact.   Cardiovascular:      Rate and Rhythm: Normal rate.   Pulmonary:      Breath sounds: Decreased air movement present. Examination of the right-middle field reveals wheezing. Examination of the left-middle field reveals wheezing. Examination of the right-lower field reveals wheezing and rales. Examination of the left-lower field reveals wheezing and rales. Decreased breath sounds, wheezing, rhonchi and rales present.      Comments: Has moist audible cough  O2 at 3l via nc  Inspiratory wheezing with basilar rales bilaterally  Inspiratory effect diminished right lower lobe.    Abdominal:      General: Bowel sounds are normal.      Palpations: Abdomen is soft.   Musculoskeletal:      Right lower leg: No edema.      Left lower leg: No edema.   Neurological:      Mental Status: She is alert.   Psychiatric:         Mood and Affect: Mood normal.         Behavior: Behavior is cooperative.         Assessment/Plan   Problem List Items Addressed This Visit       HTN (hypertension) - Primary     Monitor BP  Labs          COPD (chronic obstructive pulmonary disease) (CMS/[facility])     Has moist cough  Requires " oxygen  Wheezing with basilar rales   Lung mass discovered right upper lobe, biopsy done. 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   Levaquin started - symptomatic   Has elevated WBC.   Continue with aerosol treatments             Medications, treatments, and labs reviewed  Continue medications and treatments as listed in EMR    Aleida Jackson, APRN-CNP

## 2023-10-10 NOTE — ASSESSMENT & PLAN NOTE
Has moist cough  Requires oxygen  Wheezing with basilar rales   Lung mass discovered right upper lobe, biopsy done. 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   Levaquin started - symptomatic   Has elevated WBC.   Continue with aerosol treatments

## 2023-10-10 NOTE — LETTER
"Patient: Norah Cleaning  : 1940    Encounter Date: 10/10/2023    PROGRESS NOTE    Subjective  Chief complaint: Norah Cleaning is a 83 y.o. female who is a long term care patient, was recently readmitted for the hospital     HPI: pleasant and talkative. Is visiting with her daughter. Good historian. She reports that a mass was discovered in the upper lobe of  her right lung. Biopsy was done, results pending. 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 Remains on oxygen therapy. Has moist cough.   She reports that she was a previous smoker. Her daughter reported that her father (the resident's spouse) was also recently diagnosed with lung cancer.   HPI      Objective  Vital signs: 126/72- 98-24-93%, 3l/m    Physical Exam  Constitutional:       General: She is not in acute distress.  Eyes:      Extraocular Movements: Extraocular movements intact.   Cardiovascular:      Rate and Rhythm: Normal rate.   Pulmonary:      Breath sounds: Decreased air movement present. Examination of the right-middle field reveals wheezing. Examination of the left-middle field reveals wheezing. Examination of the right-lower field reveals wheezing and rales. Examination of the left-lower field reveals wheezing and rales. Decreased breath sounds, wheezing, rhonchi and rales present.      Comments: Has moist audible cough  O2 at 3l via nc  Inspiratory wheezing with basilar rales bilaterally  Inspiratory effect diminished right lower lobe.    Abdominal:      General: Bowel sounds are normal.      Palpations: Abdomen is soft.   Musculoskeletal:      Right lower leg: No edema.      Left lower leg: No edema.   Neurological:      Mental Status: She is alert.   Psychiatric:         Mood and Affect: Mood normal.         Behavior: Behavior is cooperative.         Assessment/Plan  Problem List Items Addressed This Visit       HTN (hypertension) - Primary     Monitor BP  Labs          COPD " (chronic obstructive pulmonary disease) (CMS/HCC)     Has moist cough  Requires oxygen  Wheezing with basilar rales   Lung mass discovered right upper lobe, biopsy done. 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   Levaquin started - symptomatic   Has elevated WBC.   Continue with aerosol treatments             Medications, treatments, and labs reviewed  Continue medications and treatments as listed in EMR    CELIO Luz      Electronically Signed By: CELIO Luz   10/10/23  7:36 PM

## 2023-10-12 NOTE — LETTER
Patient: Norah Cleaning  : 1940    Encounter Date: 10/12/2023    PROGRESS NOTE    Subjective  Chief complaint: Norah Cleaning is a 83 y.o. female who is a long term care patient being seen and evaluated for infiltrate    HPI:  Patient had recent CXR which revealed infiltrates. She was started on ATB, No adverse effects from ATB therapy noted. She denies fever or chills but does endorse cough and congestion. Denies SOB. Denies cheat pain or headache. No acute distress      Objective  Vital signs: 127/64, 98%    Physical Exam  Constitutional:       General: She is not in acute distress.  Eyes:      Extraocular Movements: Extraocular movements intact.   Cardiovascular:      Rate and Rhythm: Normal rate and regular rhythm.   Pulmonary:      Effort: Pulmonary effort is normal.      Breath sounds: Wheezing and rhonchi present.   Abdominal:      General: Bowel sounds are normal.      Palpations: Abdomen is soft.   Musculoskeletal:      Cervical back: Neck supple.      Right lower leg: No edema.      Left lower leg: No edema.   Neurological:      Mental Status: She is alert.   Psychiatric:         Mood and Affect: Mood normal.         Behavior: Behavior is cooperative.         Assessment/Plan  Problem List Items Addressed This Visit       HTN (hypertension)     Monitor BP  Labs          Pneumonia of both lungs due to infectious organism - Primary     Cont ATB until complete  Mucinex and aerosol tx          Medications, treatments, and labs reviewed  Continue medications and treatments as listed in PCC    Scribe Attestation  By signing my name below, INatalya Scribe   attest that this documentation has been prepared under the direction and in the presence of Shyla Kan MD.    Provider Attestation - Scribe documentation  All medical record entries made by the Scribe were at my direction and personally dictated by me. I have reviewed the chart and agree that the record accurately reflects my  personal performance of the history, physical exam, discussion and plan.    1. Pneumonia of both lungs due to infectious organism, unspecified part of lung        2. Hypertension, unspecified type               Electronically Signed By: Shyla Kan MD   10/12/23  5:50 PM

## 2023-10-12 NOTE — PROGRESS NOTES
PROGRESS NOTE    Subjective   Chief complaint: Norah Cleaning is a 83 y.o. female who is a long term care patient being seen and evaluated for infiltrate    HPI:  Patient had recent CXR which revealed infiltrates. She was started on ATB, No adverse effects from ATB therapy noted. She denies fever or chills but does endorse cough and congestion. Denies SOB. Denies cheat pain or headache. No acute distress      Objective   Vital signs: 127/64, 98%    Physical Exam  Constitutional:       General: She is not in acute distress.  Eyes:      Extraocular Movements: Extraocular movements intact.   Cardiovascular:      Rate and Rhythm: Normal rate and regular rhythm.   Pulmonary:      Effort: Pulmonary effort is normal.      Breath sounds: Wheezing and rhonchi present.   Abdominal:      General: Bowel sounds are normal.      Palpations: Abdomen is soft.   Musculoskeletal:      Cervical back: Neck supple.      Right lower leg: No edema.      Left lower leg: No edema.   Neurological:      Mental Status: She is alert.   Psychiatric:         Mood and Affect: Mood normal.         Behavior: Behavior is cooperative.         Assessment/Plan   Problem List Items Addressed This Visit       HTN (hypertension)     Monitor BP  Labs          Pneumonia of both lungs due to infectious organism - Primary     Cont ATB until complete  Mucinex and aerosol tx          Medications, treatments, and labs reviewed  Continue medications and treatments as listed in PCC    Scribe Attestation  By signing my name below, I, Duane Lau   attest that this documentation has been prepared under the direction and in the presence of Shyla Kan MD.    Provider Attestation - Scribe documentation  All medical record entries made by the Scribe were at my direction and personally dictated by me. I have reviewed the chart and agree that the record accurately reflects my personal performance of the history, physical exam, discussion and plan.    1.  Pneumonia of both lungs due to infectious organism, unspecified part of lung        2. Hypertension, unspecified type

## 2023-10-14 PROBLEM — J18.9 ACUTE PNEUMONIA: Status: ACTIVE | Noted: 2023-01-01

## 2023-10-14 NOTE — H&P
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 patient was placed in the right decubitus position on the CT table. A planning CT was performed and demonstrated the target right upper lobe mass. A trajectory was planned and the site was marked. The area was prepped and draped in the usual sterile fashion. Lidocaine was used for local anesthesia. Under CT guidance, a 17 gauge introducer needle was advanced into the target nodule and the stylet was removed. 5 passes were made using an 18 gauge cutting needle yielding fragmented core samples, which were placed in formalin. A BioSentry device was deployed through the introducer needle and the introducer needle was removed. Hemostasis was obtained with manual compression. A postprocedure limited CT demonstrated minimal hemorrhage in the area of biopsy without pneumothorax. A sterile dressing was applied. The patient tolerated the procedure well and there were no immediate complications.       Successful CT-guided core biopsy of the target right upper lobe lung nodule.   I was present for and/or performed the critical portions of the procedure and immediately available throughout the entire procedure.   I personally reviewed the image(s)/study and interpretation. I agree with the findings as stated.   Performed and dictated at [facility].   Signed by: Marcell Willingham 10/6/2023 8:25 AM Dictation workstation:    [id number]    XR chest 1 view    Result Date: 10/5/2023  Interpreted By:  Travis Calderon, STUDY: XR CHEST 1 VIEW;  10/5/2023 4:16 pm   INDICATION: Signs/Symptoms:s/p RUL lung biopsy.   COMPARISON: Chest CT scan from 10/02/2023. Chest x-rays, most recent from 10/01/2023.   ACCESSION NUMBER(S): [id number]   ORDERING CLINICIAN: ZAN IRWIN   TECHNIQUE: Single AP portable view of the chest was obtained.   FINDINGS: MEDIASTINUM/ LUNGS/ KATIE: Cardiomegaly without gross vascular congestion. No pleural effusion on the left. Elevation of the right diaphragm. Mild persistent pleural and parenchymal opacities at the right base. Stable calcification in the aorta. Cavitary mass in the posterior paraspinal mid right upper lobe on CT scan is not readily apparent on chest x-ray. No pneumothorax. No tracheal deviation. No abnormal hilar fullness or gross mass on either side. Stable surgical clips in the lower left neck.   BONES: No lytic or blastic destructive bone lesion. Moderate right glenohumeral joint space narrowing with spur formation.  There is mild disc space narrowing and endplate osteophytosis throughout the thoracic spine.   UPPER ABDOMEN: Grossly intact.       Mid posteromedial right upper lobe cavitary mass on CT scan dated 10/02/2023 is not readily apparent on plain films. Patient had percutaneous needle biopsy under CT guidance of this mass earlier this afternoon. No pneumothorax.   Stable underlying elevation of the right diaphragm.   Persistent pleural and parenchymal opacities at the right base could be pleural and parenchymal scarring. A component pleural fluid with pneumonia is not excluded.   Cardiomegaly without indication of CHF.   Signed by: Travis Calderon 10/5/2023 4:39 PM Dictation workstation:   [id number]    CT chest w IV contrast    Result Date: 10/2/2023  Interpreted By:  Travis Calderon, STUDY: CT CHEST W IV CONTRAST;  10/2/2023 4:25 pm   INDICATION: 81 y/o   F with  Signs/Symptoms:rul mass.    LIMITATIONS: None.   ACCESSION NUMBER(S): [id number]   ORDERING CLINICIAN: MOLLY ADAMES   TECHNIQUE: After the administration of IV nonionic contrast, thin-section images were obtained  from the thoracic inlet down through the diaphragm. Sagittal and coronal reconstruction images were generated. Mediastinal, lung, bone, and liver windows were reviewed.   IV contrast agent was Omnipaque 350, 75 mL   COMPARISON: CT scan chest from 02/04/2022 and CT scan chest from 04/20/2022. correlation with chest x-ray from 10/01/2023.   FINDINGS: CHEST WALL/BASE OF THE NECK: The chest wall was grossly intact. No thyromegaly or thyroid mass.   LUNGS/ PLEURA/ AND TRACHEA: Underlying emphysematous changes in the lungs. There is mild persistent infiltrative density with some associated bronchial wall thickening but also some volume loss indicating complicating atelectasis, in the right lower lobe. The findings are improved from the previous exams. There is mild new atelectasis in the right middle lobe anteromedially. There is a lobulated mass abutting against the paraspinal pleural surface in the posteromedial right upper lobe, with a small central cavitation. This mass measures 24 x 24 mm today, increased from 12 mm maximal diameter on 04/20/2022. Punctate pleural based nodule in the mid anterolateral left upper lobe on image 116, unchanged. Small new area linear density in the anteromedial left upper lobe centered on axial image 166. Small stable area parenchymal density in the paraspinal left lower lobe centered on image 237. No pleural effusion. No pneumothorax. The trachea was grossly intact.   MEDIASTINUM/KATIE: Mild but suspicious central mediastinal adenopathy measuring up to 11 x 24 mm on axial image 17. No suspicious left hilar adenopathy. There is some ill-defined soft tissue in the lower aspect of the right hilum related to the chronic atelectasis/pneumonia in the right lower lobe described above. No other adenopathy in the  right hilum. No axillary adenopathy. Mild cardiomegaly. Advanced four-vessel coronary artery calcifications. No pericardial effusion. Advanced mural calcifications throughout the thoracic aorta. No thoracic aortic aneurysm or dissection. No central pulmonary artery filling defect.   BONES: No destructive lytic or blastic bone lesion. Mild accentuation of distal thoracic kyphosis. Scattered mild endplate osteophytosis. Mild stable loss of height at T3. Prominent stable compression fracture at T7. Moderate stable compression fracture at T10. Stable superior endplate Schmorl's node at T11 with slight stable anterior wedging at T11. Moderate compression fracture with anterior wedging at T12, unchanged. Mild stable superior endplate compression fracture with anterior wedging at L1. The sternum is intact.   UPPER ABDOMEN: Small hiatal hernia. Multiple bilateral renal cysts. The largest on the right measures 32 x 22 mm and the largest on the left measures 47 x 24 mm. Advanced mural calcifications throughout the proximal abdominal aorta. There are diffuse bilateral renal artery calcifications, diffuse splenic artery calcifications, and mild moderate hepatic artery and SMA calcifications. Otherwise, the imaged upper abdomen was grossly intact.       Underlying emphysema. Small area of stable pleural and parenchymal density in the paraspinal left lower lobe, most likely scarring. Mild new atelectasis in the anteromedial right middle lobe and new area atelectasis or scarring in the mid anteromedial left upper lobe.   Persistent infiltrative densities in the mid to lower right lower lobe, most likely scarring and atelectasis. Chronic residual component of previous pneumonia is not excluded.   Increase in size of suspicious cavitary nodule in the paraspinal right upper lobe. Cavitary primary lung carcinoma (squamous cell carcinoma) to be excluded. Metastatic lesion from an unknown primary would be a differential consideration.  Cavitary infection is considered less likely.   Mild but suspicious central mediastinal adenopathy. Consider PET-CT scan in follow-up.   Cardiomegaly. Advanced four-vessel coronary artery calcifications.   Advanced mural calcifications throughout the thoracic aorta and imaged abdominal aorta. No thoracic or abdominal aortic aneurysm in this exam. There are additional calcifications in the abdominal vessels as described.   Small hiatal hernia.   Bilateral renal cysts.   Multiple stable spinal compression fractures as described.   MACRO: None   Signed by: Travis Calderon 10/2/2023 4:52 PM Dictation workstation:   [id number]    CT soft tissue neck w IV contrast    Result Date: 10/2/2023  Interpreted By:  Jaron Carlson, STUDY: CT SOFT TISSUE NECK W IV CONTRAST;  10/2/2023 2:09 am   INDICATION: Signs/Symptoms:narrowing of trachea difficulty swallowing.   COMPARISON: None.   ACCESSION NUMBER(S): [id number]   ORDERING CLINICIAN: MOLLY ADAMES   TECHNIQUE: Following intravenous injection  axial CT was performed from the skullbase to the thoracic inlet and multiplanar reconstructions were made.   FINDINGS: *  The visualized paranasal sinuses nasopharynx and oropharynx are unremarkable. *The mandible, maxilla and temporomandibular joints are normal. *The major salivary glands are normal. *There is no measurable cervical lymphadenopathy. *The larynx and related cartilages are normal. *The thyroid gland is normal. *The thoracic inlet is normal. *The airway and visualized esophagus are normal *There are abundant calcifications of the thoracic aorta and the great vessels. There is marked dilatation to a proximally 2 cm maximum diameter, at the left carotid bifurcation with peripheral calcification. There is no evidence of intraluminal thrombus *There is a 2 cm mass with central necrosis or emphysematous bulla at the right pulmonary apex that can not be further characterized. This nodule has increased in size compared to the  previous examination dated April 2022 neoplasm not excluded.       * There is no evidence of mass, cyst or adenopathy in the neck. 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 Notification was initiated on 10/2/2023 at 7:19 am by  Jaron Carlson. (**-OCF-**) Instructions:     THIS EXAMINATION WAS INTERPRETED AT [facility] [place]   Signed by: Jaron Carlson 10/2/2023 7:21 AM Dictation workstation:   [id number]    XR chest 2 views    Result Date: 10/1/2023  Interpreted By:  Matilda Evangelista, STUDY: XR CHEST 2 VIEWS;  10/1/2023 1:05 pm   INDICATION: Signs/Symptoms:cough + wheezing r/o underlying infiltrate.   COMPARISON: 10/25/2022   ACCESSION NUMBER(S): [id number]   ORDERING CLINICIAN: MAXIMUS ARRIAGA   FINDINGS: The cardiac silhouette is prominent. There are atherosclerotic changes of the aorta.   The lungs are hyperinflated. There is interstitial lung disease. There is more confluent density at the right base.   There is no definite pleural fluid.   There are degenerative changes of the spine. There are compression fractures. The bones are osteoporotic.   There is surgical clips in the neck on the left.   COMPARISON OF FINDING: The right lung is improved in appearance.       Prominent cardiac silhouette.   COPD.   Right basilar infiltrate.   Signed by: Matilda Evangelista 10/1/2023 1:08 PM Dictation workstation:   [id number]         Assessment/Plan   Principal Problem:  1. Acute pneumonia  - Vanc and zosyn to cover for HCAP since pt was recently hospitalized  - no reported cough, robitussin if needed    2. NSTEMI  - pt denies CP  - no acute ischemic ECG changes  - low  intensity heparin gtt started  - Cardiology consult    3. Cavitary lung mass  - likely malignancy  - biposied last admission.     4. COPD with oxygen   - 3LPM continuous  - duonebs if needed  - no acute exacerbation    5. Code status   - per ED is DNR-CCA         I  spent 45 minutes in the professional and overall care of this patient.      Herminia Porter MD

## 2023-10-14 NOTE — CARE PLAN
The patient's goals for the shift include pain control    The clinical goals for the shift include patient needs good skin care    Over the shift, the patient did not make progress toward the following goals. Barriers to progression include patient has a lot of pain and does not like to shift in bed. Recommendations to address these barriers include patient needs educated about turning.
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
## 2023-10-14 NOTE — PROGRESS NOTES
"Vancomycin Dosing by Pharmacy- INITIAL    Norah Cleaning is a 83 y.o. year old female who Pharmacy has been consulted for vancomycin dosing for pneumonia. Based on the patient's indication and renal status this patient will be dosed based on a goal AUC of 400-600.     Renal function is currently declining with SrCr of 1.47.    Visit Vitals  /54 (BP Location: Left arm, Patient Position: Lying)   Pulse 80   Temp 36.6 °C (97.8 °F) (Oral)   Resp 18        Lab Results   Component Value Date    CREATININE 1.47 (H) 10/13/2023    CREATININE 0.94 10/06/2023    CREATININE 0.96 10/04/2023    CREATININE 1.00 10/03/2023        Patient weight is No results found for: \"PTWEIGHT\"    No results found for: \"CULTURE\"     I/O last 3 completed shifts:  In: 350 (7 mL/kg) [IV Piggyback:350]  Out: - (0 mL/kg)   Weight: 50 kg   [unfilled]    Lab Results   Component Value Date    PATIENTTEMP 37.0 10/13/2023    PATIENTTEMP 37.0 03/23/2022    PATIENTTEMP 37.0 03/23/2022          Assessment/Plan     Patient has already been given a loading dose of 1000 mg.  Will be dosing by levels since patient is in [facility]. Level will be checked tomorrow to determine next dose.  Follow-up level will be ordered on 10/14 at 0500 unless clinically indicated sooner.  Will continue to monitor renal function daily while on vancomycin and order serum creatinine at least every 48 hours if not already ordered.  Follow for continued vancomycin needs, clinical response, and signs/symptoms of toxicity.       Marcia Fisher, PharmD       "

## 2023-10-14 NOTE — CONSULTS
"Inpatient consult to Cardiology  Consult performed by: Samara Chaudhary, JOIE-CNP  Consult ordered by: Herminia Porter MD  Reason for consult: NSTEMI  Assessment/Recommendations: Agree with heparin for now.   Check D-Dimer to R/O VTE in the setting of Active CA and recent admission  Troponin Elevation without Myocardial Infarction occurring in the setting of Aortic Stenosis, Chronic Hypoxic Respiratory Failure and known Severe Concentric LVH by Prior echocardiogramand known Multi-Vessel CAD by Ct Imaging with superimposed presumed bacterial pneumonia.   We will obtain a transthoracic echocardiogram for structural evaluation including ejection fraction, assessment of regional wall motion abnormalities or valvular disease, and further evaluation of hemodynamics.          History Of Present Illness:    Norah Cleaning is a 83 y.o. female with past medical history significant for hypertension, chronic diastolic heart failure, COPD on chronic home oxygen, emphysema, new diagnosis of lung cancer, moderate AS ,obstructive sleep apnea, AAA, nonobstructive CAD per cardiac cath 2008 presenting to emergency room complaining of worsening of dyspnea from facility, concerns for pneumonia.  Lab work remarkable for elevated troponin with relatively flat trend at 813, 966, 791, AURA with Cr 1.46( 0.94 on 10/6/23).  Cardiology is consulted for further evaluation of non-ST elevation myocardial infarction.  She was a started on broad-spectrum antibiotics, was given 2 mg of IV fluids, chest x-ray with reported new mass but no clear pneumonia.  She was also started on heparin drip.      Patient reports shortness of breath at all times associated with productive cough \"yellowish, greenish\" and sputum also with fevers per her account since 2 days ago.  She denies any chest pain, lower extremity edema, no nausea, vomiting or diaphoresis.  She denies palpitation, loss of consciousness.  She denies any significant prior cardiac history " however she carries diagnosis of moderate aortic stenosis, she is aware of recent diagnosis of lung cancer however has not had a visit with oncology yet.  She quit smoking 3 years ago.  At present complains of ongoing cough, productive as well as shortness of breath.  On oxygen via nasal cannula.    All other systems reviewed & negative unless mentioned in HPI.         Echo 3/2022     1. The left ventricular systolic function is normal with a 70-75% estimated ejection fraction.   2. Spectral Doppler shows an impaired relaxation pattern of left ventricular diastolic filling.   3. There is severe concentric left ventricular hypertrophy.   4. The left atrium is severely dilated.   5. There is moderate mitral annular calcification.   6. Moderately elevated right ventricular systolic pressure.   7. Moderate aortic valve stenosis.    Nuclear stress test 2018  Negative for ischemia    CT CHEST 10/2/2023    Underlying emphysema. Small area of stable pleural and parenchymal  density in the paraspinal left lower lobe, most likely scarring. Mild  new atelectasis in the anteromedial right middle lobe and new area  atelectasis or scarring in the mid anteromedial left upper lobe.  Persistent infiltrative densities in the mid to lower right lower  lobe, most likely scarring and atelectasis. Chronic residual  component of previous pneumonia is not excluded.    Increase in size of suspicious cavitary nodule in the paraspinal  right upper lobe. Cavitary primary lung carcinoma (squamous cell  carcinoma) to be excluded. Metastatic lesion from an unknown primary  would be a differential consideration. Cavitary infection is  considered less likely.    Mild but suspicious central mediastinal adenopathy. Consider PET-CT  scan in follow-up.    Cardiomegaly. Advanced four-vessel coronary artery calcifications.  Advanced mural calcifications throughout the thoracic aorta and  imaged abdominal aorta. No thoracic or abdominal aortic aneurysm  "in  this exam. There are additional calcifications in the abdominal  vessels as described.    Small hiatal hernia.  Bilateral renal cysts.  Multiple stable spinal compression fractures as described.    Signed by: Travis Calderon 10/2/2023 4:52 PM  Dictation workstation: [id number]  Not Vldtd      Past medical history    CAD, mild nonobstructive by cath in 2008  Chronic diastolic heart failure  Moderate AS  Severe LVH  Hypertension  Dyslipidemia  PAD with history of left-sided carotid endarterectomy, status post bilateral CEA and aorto BI-FEM bypass  Pituitary tumor with adrenal insufficiency  Vertebral compression fractures  IBS  Fibromyalgia  Depression  Anxiety    Social history  Former smoker 50 pack years smoking history, quit 3 years ago.  No heavy alcohol use and no drug use    Family history  Notable for breast and colon cancer, type 2 diabetes and hypertension, also with family history of CAD and MI in first-degree relatives.        Last Recorded Vitals:  Vitals:    10/14/23 0300 10/14/23 0332 10/14/23 0528 10/14/23 0624   BP: (!) 108/49 (!) 94/44 110/54    BP Location:   Left arm    Patient Position:   Lying    Pulse: 68 65 80    Resp: 18  18    Temp: 37.1 °C (98.8 °F)  36.6 °C (97.8 °F)    TempSrc:   Oral    SpO2: 100% (!) 89% 93%    Weight:    50 kg (110 lb 4.8 oz)   Height:    1.6 m (5' 2.99\")       LABS:  CMP:  Results from last 7 days   Lab Units 10/13/23  1937   SODIUM mmol/L 134*   POTASSIUM mmol/L 4.0   CHLORIDE mmol/L 95*   CO2 mmol/L 33*   ANION GAP mmol/L 10   BUN mg/dL 17   CREATININE mg/dL 1.47*   EGFR mL/min/1.73m*2 35*   ALBUMIN g/dL 3.3*   ALT U/L 5*   AST U/L 12   BILIRUBIN TOTAL mg/dL 0.4     CBC:  Results from last 7 days   Lab Units 10/13/23  1937   WBC AUTO x10*3/uL 12.2*   HEMOGLOBIN g/dL 8.8*   HEMATOCRIT % 27.7*   PLATELETS AUTO x10*3/uL 324   MCV fL 95     COAG:     ABO: No results found for: \"ABO\"  HEME/ENDO:     CARDIAC:   Results from last 7 days   Lab Units 10/14/23  0241 " 10/14/23  0023 10/13/23  1937   TROPHS ng/L 791* 966* 813*          Last I/O:  I/O last 3 completed shifts:  In: 350 (7 mL/kg) [IV Piggyback:350]  Out: - (0 mL/kg)   Weight: 50 kg       Inpatient Medications:  Scheduled medications   Medication Dose Route Frequency    acetaminophen  650 mg oral TID    aspirin  81 mg oral Daily    atorvastatin  40 mg oral Nightly    azithromycin  500 mg intravenous q24h    budesonide  0.5 mg nebulization BID    cefTRIAXone  1 g intravenous q24h    cholecalciferol  50 mcg oral BID    DULoxetine  30 mg oral Daily    ferrous sulfate  65 mg of iron oral Daily    formoterol  20 mcg nebulization BID    gabapentin  200 mg oral TID    heparin  60 Units/kg intravenous Once    ketotifen  1 drop Both Eyes BID    lactobacillus acidophilus  1 capsule oral Daily    losartan  50 mg oral Daily    melatonin  4.5 mg oral Nightly    metoprolol succinate XL  12.5 mg oral Daily    montelukast  10 mg oral Daily    pantoprazole  40 mg oral Daily before breakfast    piperacillin-tazobactam  3.375 g intravenous q6h    polyethylene glycol  17 g oral Daily    sennosides  1 tablet oral BID    sildenafil  20 mg oral TID     PRN medications   Medication    acetaminophen    acetaminophen    acetaminophen    albuterol    benzonatate    bisacodyl    guaiFENesin    heparin    ondansetron    Or    ondansetron    oxyCODONE-acetaminophen     Continuous Medications   Medication Dose Last Rate    heparin  0-4,000 Units/hr      sodium chloride 0.9%  75 mL/hr 75 mL/hr (10/14/23 0552)     Outpatient Medications:  Current Outpatient Medications   Medication Instructions    acetaminophen (Tylenol) 325 mg tablet 2 tablets, oral, 3 times daily    acetaminophen (Tylenol) 650 mg suppository 1 suppository, rectal, Every 4 hours PRN    acetaminophen (TYLENOL) 650 mg, oral, Every 4 hours PRN    albuterol 90 mcg/actuation inhaler 2 puffs, inhalation, Every 6 hours PRN    alendronate (FOSAMAX) 70 mg, oral, Every 7 days, On Sunday     aspirin 81 mg chewable tablet 1 tablet, oral, Daily    atorvastatin (LIPITOR) 40 mg, oral, Nightly    benzonatate (TESSALON) 100 mg, oral, 3 times daily PRN, Do not crush or chew.    bisacodyl (Dulcolax, bisacodyl,) 10 mg suppository 1 suppository, rectal, Once as needed    cholecalciferol (VITAMIN D-3) 50 mcg, oral, 2 times daily    diclofenac sodium (Voltaren) 1 % gel gel 1 Application, Topical, 2 times daily PRN    DULoxetine (CYMBALTA) 30 mg, oral, Daily, Do not crush or chew.    ferrous sulfate 325 (65 Fe) MG tablet 65 mg of iron, oral, Daily    fluticasone propion-salmeteroL (Advair Diskus) 500-50 mcg/dose diskus inhaler 1 puff, inhalation, 2 times daily RT, Rinse mouth with water after use to reduce aftertaste and incidence of candidiasis. Do not swallow.    gabapentin (NEURONTIN) 200 mg, oral, 3 times daily    ipratropium-albuteroL (Duo-Neb) 0.5-2.5 mg/3 mL nebulizer solution 3 mL, nebulization, Every 6 hours PRN    Lactobacillus acidophilus 1 billion cell capsule 1 capsule, oral, 2 times daily    levoFLOXacin (LEVAQUIN) 500 mg, oral, Nightly, For increased WBC    lidocaine 4 % patch 1 patch, transdermal, Daily, Remove & discard patch within 12 hours or as directed by MD. Apply to right knee    losartan (COZAAR) 50 mg, oral, Daily    magnesium hydroxide (Milk of Magnesia) 400 mg/5 mL suspension 30 mL, oral, Daily PRN    melatonin 5 mg tablet 1 tablet, oral, Nightly    metoprolol succinate XL (TOPROL-XL) 12.5 mg, oral, Daily, Do not crush or chew.    mineral oil enema 1 enema, rectal, Daily PRN    montelukast (SINGULAIR) 10 mg, oral, Daily    nicotine polacrilex (COMMIT) 2 mg, Mouth/Throat, Every 2 hour PRN    olopatadine (Patanol) 0.1 % ophthalmic solution 1 drop, Both Eyes, 3 times daily    ondansetron (ZOFRAN) 4 mg, oral, Every 8 hours PRN    oxyCODONE-acetaminophen (Percocet) 5-325 mg tablet oral, Every 4 hours PRN    pantoprazole (PROTONIX) 40 mg, oral, Daily before breakfast, Do not crush, chew, or  split.    sennosides (Senokot) 8.6 mg tablet 1 tablet, oral, 2 times daily    sildenafil (VIAGRA) 25 mg, oral, 3 times daily, For hypertension    tiotropium (Spiriva Respimat) 2.5 mcg/actuation inhaler 2 puffs, inhalation, Daily    torsemide (DEMADEX) 10 mg, oral, Daily, For edema         Physical Exam  Vitals reviewed.   Constitutional:       Appearance: Normal appearance.   Eyes:      Pupils: Pupils are equal, round, and reactive to light.   Cardiovascular:      Rate and Rhythm: Normal rate and regular rhythm.      Pulses: Normal pulses.      Heart sounds: + Systolic murmur most consistent with aortic stenosis  Pulmonary:      Effort: Short of breath     Breath sounds: Diminished with rhonchi  Abdominal:      General: Abdomen is flat. Bowel sounds are normal.      Palpations: Abdomen is soft.   Musculoskeletal:         General: Normal range of motion.   Skin:     General: Skin is warm and dry.      Capillary Refill: Capillary refill takes less than 2 seconds.   Neurological:      General: No focal deficit present.      Mental Status: He is alert.   Psychiatric:         Mood and Affect: Mood normal.        Assessment/Plan      Norah Cleaning is a 83 y.o. female with past medical history significant for hypertension, chronic diastolic heart failure, COPD on chronic home oxygen, emphysema, new diagnosis of lung cancer, moderate AS ,obstructive sleep apnea, AAA, nonobstructive CAD per cardiac cath 2008 presenting to emergency room complaining of worsening of dyspnea from facility, concerns for pneumonia.  Lab work remarkable for elevated troponin with relatively flat trend at 813, 966, 791, AURA with Cr 1.46( 0.94 on 10/6/23).  Cardiology is consulted for further evaluation of non-ST elevation myocardial infarction.  She was a started on broad-spectrum antibiotics, was given 2 mg of IV fluids, chest x-ray with reported new mass but no clear pneumonia.  She was also started on heparin drip.      I reviewed ECG.   Sinus rhythm, nonspecific T wave abnormality, LVH.  No acute ischemic changes.  I reviewed telemetry.  Patient currently is not on telemetry.  I reviewed chest x-ray radiology image interpretation.    1.  Non-ST elevation myocardial infarction Nil    Elevated troponin does not suggest acute MI pattern, higher degree of elevation however relatively flat trend.  Patient denies any chest pain and EKG negative for any acute ischemic changes however mention of three-vessel coronary artery disease per CT chest from earlier in October.  Given diagnosis of lung cancer we will check VTE exclusion D-dimer and if elevated will need to rule out pulmonary embolism.  For now we will continue heparin drip and final recommendation pending results from VTE exclusion D-dimer and imaging if indicated as well as echocardiogram    2.  Aortic stenosis  Previously reported as moderate per echocardiogram back in March 2022  Mention of bicuspid aortic valve and old records however no mention of this in most recent echo  She does have a murmur  Benefits from further evaluation by echocardiogram as above      3.  Shortness of breath  Euvolemic on exam and per imaging  Suspect secondary to pneumonia given reported fever as well as productive cough with greenish-yellowish sputum  Also with lung cancer, VTE exclusion D-dimer as above, echo as above  Checking a BNP      4.  Reported history of chronic diastolic heart failure  Euvolemic at present as #3    She previously followed with Dr. Holbrook for cardiology care however last visit was in 2022.    Code Status:  DNR      ============================================  Attending Note   ============================================  Both the MELVINA and I have had a face to face encounter with the patient today. I have examined the patient and edited the documented physical examination as necessary.  I personally reviewed the patient's recent labs, medications, orders, EKGs, and pertinent cardiac  "imaging.  I have reviewed the MELVINA's encounter note, approve the MELVINA's documentation and have edited the note to reflect the diagnostic and therapeutic plan.    Norah Cleaning is a 83 y.o. female with past medical history significant for hypertension, chronic diastolic heart failure, COPD on chronic home oxygen, emphysema, new diagnosis of lung cancer, moderate AS ,obstructive sleep apnea, AAA, nonobstructive CAD per cardiac cath 2008 presenting to emergency room complaining of worsening of dyspnea from facility, concerns for pneumonia.  Lab work remarkable for elevated troponin with relatively flat trend at 813, 966, 791, AURA with Cr 1.46( 0.94 on 10/6/23).  Cardiology is consulted for further evaluation of non-ST elevation myocardial infarction.  She was a started on broad-spectrum antibiotics, was given 2 mg of IV fluids, chest x-ray with reported new mass but no clear pneumonia.  She was also started on heparin drip.      Patient reports shortness of breath at all times associated with productive cough \"yellowish, greenish\" and sputum also with fevers per her account since 2 days ago.  She denies any chest pain, lower extremity edema, no nausea, vomiting or diaphoresis.  She denies palpitation, loss of consciousness.  She denies any significant prior cardiac history however she carries diagnosis of moderate aortic stenosis, she is aware of recent diagnosis of lung cancer however has not had a visit with oncology yet.  She quit smoking 3 years ago.  At present complains of ongoing cough, productive as well as shortness of breath.  On oxygen via nasal cannula.    No exacerbating or relieving factors.  Patient denies chest pain and angina.  Pt reports  shortness of breath, dyspnea on exertion,  but no orthopnea or paroxysmal nocturnal dyspnea.  Pt denies worsening lower extremity edema.  Pt denies palpitations or syncope.  No recent falls.  No fever or chills.  Reports Productive  cough.  No change in bowel or " bladder habits.  No sick contacts.  No recent travel.     12 point review of systems including (Constitutional, Eyes, ENMT, Respiratory, Cardiac, Gastrointestinal, Neurological, Psychiatric, and Hematologic) was performed and is otherwise negative.    Past medical history:  As above.    Medications were reviewed.    Allergies were reviewed.    Social history:  Patient denies smoking, alcohol abuse, or illicit drug use.    Family history:  No sudden cardiac death or premature coronary artery disease.     General:  Patient is resting soundly when seen. Supplemental oxygen is in place  HEENT:  Moist mucosa.    Neck:  No thyromegaly.  Normal Jugular Venous Pressure.  Cardiovascular:  Regular rate and rhythm.  Normal S1 and S2. III/VI ALEJANDRA   Pulmonary:  Diminished across Anterior Fields.  Abdomen:  Soft. Non-tender.   Non-distended.  Positive bowel sounds.  Lower Extremities:  2+ pedal pulses. No LE edema.  Neurologic:  Cranial nerves intact.  No focal deficit.   Skin: Skin warm and dry, normal skin turgor.   Psychiatric: Normal affect.    Vital signs, telemetry, medications, labs, and imaging were reviewed as well.     Norah Cleaning is a 83 y.o. female with past medical history significant for hypertension, chronic diastolic heart failure, COPD on chronic home oxygen, emphysema, new diagnosis of lung cancer, moderate AS ,obstructive sleep apnea, AAA, nonobstructive CAD per cardiac cath 2008 presenting to emergency room complaining of worsening of dyspnea from facility, concerns for pneumonia.  Lab work remarkable for elevated troponin with relatively flat trend at 813, 966, 791, AURA with Cr 1.46( 0.94 on 10/6/23).  Cardiology is consulted for further evaluation of non-ST elevation myocardial infarction.  She was a started on broad-spectrum antibiotics, was given 2 mg of IV fluids, chest x-ray with reported new mass but no clear pneumonia.  She was also started on heparin drip.      I reviewed ECG.  Sinus rhythm,  nonspecific T wave abnormality, LVH.  No acute ischemic changes.  I reviewed telemetry.  Patient currently is not on telemetry.  I reviewed chest x-ray radiology image interpretation.    1.  Non-ST elevation myocardial infarction Nil    Elevated troponin does not suggest acute MI pattern, higher degree of elevation however relatively flat trend.  Patient denies any chest pain and EKG negative for any acute ischemic changes however mention of three-vessel coronary artery disease per CT chest from earlier in October.  Troponin Elevation without Myocardial Infarction occurring in the setting of Aortic Stenosis, Chronic Hypoxic Respiratory Failure and known Severe Concentric LVH by Prior echocardiogramand known Multi-Vessel CAD by Ct Imaging with superimposed presumed bacterial pneumonia  Given diagnosis of lung cancer we will check VTE exclusion D-dimer and if elevated will need to rule out pulmonary embolism.  For now we will continue heparin drip and final recommendation pending results from VTE exclusion D-dimer and imaging if indicated as well as echocardiogram    2.  Aortic stenosis  Previously reported as moderate per echocardiogram back in March 2022  Mention of bicuspid aortic valve and old records however no mention of this in most recent echo  We will obtain a transthoracic echocardiogram for structural evaluation including ejection fraction, assessment of regional wall motion abnormalities or valvular disease, and further evaluation of hemodynamics.        3.  Shortness of breath  Euvolemic on exam and per imaging  Suspect secondary to pneumonia given reported fever as well as productive cough with greenish-yellowish sputum  Also with lung cancer, VTE exclusion D-dimer as above, echo as above  Checking a BNP      4.  Reported history of chronic diastolic heart failure  Euvolemic at present as #3    Reviewed and approved by ANITHA GOODE on 10/14/23 at 12:55 PM.

## 2023-10-14 NOTE — PROGRESS NOTES
Pharmacy Medication History Review    Norah Cleaning is a 83 y.o. female admitted for Acute pneumonia. Pharmacy reviewed the patient's [id number] medications and allergies for accuracy.    The list below reflects the updated PTA list. Please review each medication in order reconciliation for additional clarification and justification.  Medications Prior to Admission   Medication Sig Dispense Refill Last Dose    acetaminophen (Tylenol) 325 mg tablet Take 2 tablets (650 mg) by mouth every 4 hours if needed for moderate pain (4 - 6) or mild pain (1 - 3).       acetaminophen (Tylenol) 325 mg tablet Take 2 tablets (650 mg) by mouth 3 times a day.       acetaminophen (Tylenol) 650 mg suppository Insert 1 suppository (650 mg) into the rectum every 4 hours if needed for mild pain (1 - 3) or fever (temp greater than 38.0 C).       albuterol 90 mcg/actuation inhaler Inhale 2 puffs every 6 hours if needed.       alendronate (Fosamax) 70 mg tablet Take 1 tablet (70 mg) by mouth every 7 days. On Sunday       aspirin 81 mg chewable tablet Chew 1 tablet (81 mg) once daily.       atorvastatin (Lipitor) 40 mg tablet Take 1 tablet (40 mg) by mouth once daily at bedtime.       benzonatate (Tessalon) 100 mg capsule Take 1 capsule (100 mg) by mouth 3 times a day as needed for cough. Do not crush or chew.       bisacodyl (Dulcolax, bisacodyl,) 10 mg suppository Insert 1 suppository (10 mg) into the rectum 1 time if needed for constipation (if no results from Milk of mag).       cholecalciferol (Vitamin D-3) 50 MCG (2000 UT) tablet Take 1 tablet (50 mcg) by mouth 2 times a day.       diclofenac sodium (Voltaren) 1 % gel gel Apply 1 Application topically 2 times a day as needed (pain).       DULoxetine (Cymbalta) 30 mg DR capsule Take 1 capsule (30 mg) by mouth once daily. Do not crush or chew.       ferrous sulfate 325 (65 Fe) MG tablet Take 1 tablet (65 mg of iron) by mouth once daily.       fluticasone propion-salmeteroL  (Advair Diskus) 500-50 mcg/dose diskus inhaler Inhale 1 puff 2 times a day. Rinse mouth with water after use to reduce aftertaste and incidence of candidiasis. Do not swallow.       gabapentin (Neurontin) 100 mg capsule Take 2 capsules (200 mg) by mouth 3 times a day.       [] guaiFENesin (Mucinex) 600 mg 12 hr tablet Take 2 tablets (1,200 mg) by mouth 2 times a day. Do not crush, chew, or split.   10/13/2023    ipratropium-albuteroL (Duo-Neb) 0.5-2.5 mg/3 mL nebulizer solution Take 3 mL by nebulization every 6 hours if needed for wheezing.       Lactobacillus acidophilus 1 billion cell capsule Take 1 capsule by mouth 2 times a day.       levoFLOXacin (Levaquin) 500 mg tablet Take 1 tablet (500 mg) by mouth once daily at bedtime. For increased WBC       lidocaine 4 % patch Place 1 patch on the skin once daily. Remove & discard patch within 12 hours or as directed by MD. Apply to right knee       losartan (Cozaar) 50 mg tablet Take 1 tablet (50 mg) by mouth once daily.       magnesium hydroxide (Milk of Magnesia) 400 mg/5 mL suspension Take 30 mL by mouth once daily as needed for constipation (for no BM in 3 days).       melatonin 5 mg tablet Take 1 tablet (5 mg) by mouth once daily at bedtime.       metoprolol succinate XL (Toprol-XL) 25 mg 24 hr tablet Take 0.5 tablets (12.5 mg) by mouth once daily. Do not crush or chew.       mineral oil enema Insert 1 enema into the rectum once daily as needed for constipation (if no results from suppository. Call MD if no results from enema).       montelukast (Singulair) 10 mg tablet Take 1 tablet (10 mg) by mouth once daily.       nicotine polacrilex (Commit) 2 mg lozenge Use 1 lozenge (2 mg) in the mouth or throat every 2 hours if needed for smoking cessation.       olopatadine (Patanol) 0.1 % ophthalmic solution Administer 1 drop into both eyes 3 times a day.       ondansetron (Zofran) 4 mg tablet Take 1 tablet (4 mg) by mouth every 8 hours if needed for nausea or  vomiting.       oxyCODONE-acetaminophen (Percocet) 5-325 mg tablet Take by mouth every 4 hours if needed for severe pain (7 - 10).       pantoprazole (ProtoNix) 40 mg EC tablet Take 1 tablet (40 mg) by mouth once daily in the morning. Take before meals. Do not crush, chew, or split.       sennosides (Senokot) 8.6 mg tablet Take 1 tablet (8.6 mg) by mouth 2 times a day.       sildenafil (Viagra) 25 mg tablet Take 1 tablet (25 mg) by mouth 3 times a day. For hypertension       tiotropium (Spiriva Respimat) 2.5 mcg/actuation inhaler Inhale 2 puffs once daily.       torsemide (Demadex) 10 mg tablet Take 1 tablet (10 mg) by mouth once daily. For edema           The list below reflects the updated allergy list. Please review each documented allergy for additional clarification and justification.  Allergies  Indicated as Unable to Assess by Madonna Metz PharmD on 10/14/2023 (Patient not present)   No Known Allergies         Below are additional concerns with the patient's PTA list.    Erika scheduled to end 10/17.     Sources: [facility] faxed medication list - [facility]    Madonna Metz PharmD  Transitions of Care Clinical Pharmacist  [facility]  Please reach out via [employer] Secure Chat for questions, or if no response call [phone] or [employer] “MedRec”

## 2023-10-15 NOTE — PROGRESS NOTES
"Norah Cleaning is a 83 y.o. female on day 1 of admission presenting with Acute pneumonia.    Subjective   On home o2 3.5L. Afebrile. Still with persistent dry cough. Tolerating PO intake. No overnight events reported.        Objective     Physical Exam  Constitutional:       Appearance: Normal appearance.   Cardiovascular:      Rate and Rhythm: Normal rate and regular rhythm.   Pulmonary:      Effort: Pulmonary effort is normal.      Breath sounds: Rhonchi present.   Abdominal:      General: Abdomen is flat. Bowel sounds are normal.      Palpations: Abdomen is soft.   Musculoskeletal:      Cervical back: Normal range of motion.   Skin:     General: Skin is warm.   Neurological:      General: No focal deficit present.      Mental Status: She is alert and oriented to person, place, and time. Mental status is at baseline.   Psychiatric:         Mood and Affect: Mood normal.         Behavior: Behavior normal.         Thought Content: Thought content normal.         Judgment: Judgment normal.         Last Recorded Vitals  Blood pressure 98/54, pulse 68, temperature 36.1 °C (97 °F), resp. rate 18, height 1.6 m (5' 2.99\"), weight 50 kg (110 lb 4.8 oz), SpO2 98 %.  Intake/Output last 3 Shifts:  I/O last 3 completed shifts:  In: 1350 (27 mL/kg) [I.V.:1000 (20 mL/kg); IV Piggyback:350]  Out: 575 (11.5 mL/kg) [Urine:575 (0.3 mL/kg/hr)]  Weight: 50 kg     Relevant Results  Results for orders placed or performed during the hospital encounter of 10/13/23 (from the past 24 hour(s))   D-dimer, VTE Exclusion   Result Value Ref Range    D-Dimer, Quantitative VTE Exclusion 1,600 (H) <=500 ng/mL FEU   Heparin Assay, UFH   Result Value Ref Range    Heparin Unfractionated 0.4 See Comment Below for Therapeutic Ranges IU/mL   Transthoracic Echo (TTE) Complete   Result Value Ref Range    LV A4C EF 73.6    Green Top   Result Value Ref Range    Extra Tube Hold for add-ons.    Heparin Assay, UFH   Result Value Ref Range    Heparin " Unfractionated 0.5 See Comment Below for Therapeutic Ranges IU/mL   Lavender Top   Result Value Ref Range    Extra Tube Hold for add-ons.    Vancomycin, Trough   Result Value Ref Range    Vancomycin, Trough 6.0 5.0 - 20.0 ug/mL   Basic metabolic panel   Result Value Ref Range    Glucose 82 74 - 99 mg/dL    Sodium 138 136 - 145 mmol/L    Potassium 3.4 (L) 3.5 - 5.3 mmol/L    Chloride 104 98 - 107 mmol/L    Bicarbonate 28 21 - 32 mmol/L    Anion Gap 9 (L) 10 - 20 mmol/L    Urea Nitrogen 9 6 - 23 mg/dL    Creatinine 0.85 0.50 - 1.05 mg/dL    eGFR 69 >60 mL/min/1.73m*2    Calcium 7.1 (L) 8.6 - 10.3 mg/dL         Medications:  acetaminophen, 650 mg, oral, TID  albuterol, 2.5 mg, nebulization, TID  aspirin, 81 mg, oral, Daily  atorvastatin, 40 mg, oral, Nightly  azithromycin, 500 mg, intravenous, q24h  budesonide, 0.5 mg, nebulization, BID  cholecalciferol, 50 mcg, oral, BID  DULoxetine, 30 mg, oral, Daily  ferrous sulfate, 65 mg of iron, oral, Daily  formoterol, 20 mcg, nebulization, BID  gabapentin, 200 mg, oral, TID  ketotifen, 1 drop, Both Eyes, BID  lactobacillus acidophilus, 1 capsule, oral, Daily  [Held by provider] losartan, 50 mg, oral, Daily  melatonin, 4.5 mg, oral, Nightly  metoprolol succinate XL, 12.5 mg, oral, Daily  montelukast, 10 mg, oral, Daily  pantoprazole, 40 mg, oral, Daily before breakfast  piperacillin-tazobactam, 3.375 g, intravenous, q6h  polyethylene glycol, 17 g, oral, Daily  potassium chloride CR, 40 mEq, oral, Once  sennosides, 1 tablet, oral, BID  sildenafil, 20 mg, oral, TID       PRN medications: acetaminophen, acetaminophen, acetaminophen, albuterol, benzonatate, bisacodyl, guaiFENesin, heparin, ondansetron **OR** ondansetron, oxyCODONE-acetaminophen, oxygen     Assessment/Plan     10/15:  PNA, + mrsa... will cover with v/z for now and f/up ID recs. I'm not sure this is true PNA or whatever her lung mass is which has been biopsied. She's tolerating home o2 3.5L.   ? Nstemi/Rule out PE...  v/q scan pending, on hep gtt... f/up imaging and any further cardio recs.   Echo with severe Aortic Stenosis... bp remains soft... cont ivf, f/up cardio.   Home regimen for chronic conditions  Hx pulm htn  Hx copd home o2  Pt is from a snf, ambulates with walker.... pt/ot  Dvt px covered with hep gtt for now.             Pedro Morrison MD  [facility]

## 2023-10-15 NOTE — PROGRESS NOTES
Subjective Data:  Patient seen and examined, chart reviewed  -- Seen in the presence of her son.  Progress.  Discussed medical care as present  -- Doing well, breathing easier, productive cough  -- Awaiting VQ scan      Objective Data:  Last Recorded Vitals:  Vitals:    10/15/23 0330 10/15/23 0755 10/15/23 0942 10/15/23 1121   BP: 101/60 98/54  94/57   BP Location: Left arm      Patient Position: Lying      Pulse: 69 68 92 74   Resp: 18 18  16   Temp: 36.3 °C (97.4 °F) 36.1 °C (97 °F)  36.5 °C (97.7 °F)   TempSrc: Oral      SpO2: 95% 98% 91% 90%   Weight:       Height:           Last Labs:  Results from last 7 days   Lab Units 10/14/23  0653 10/13/23  1937   WBC AUTO x10*3/uL 11.6* 12.2*   HEMOGLOBIN g/dL 9.0* 8.8*   HEMATOCRIT % 31.4* 27.7*   PLATELETS AUTO x10*3/uL 296 324     Results from last 7 days   Lab Units 10/15/23  0623 10/14/23  0653 10/13/23  1937   SODIUM mmol/L 138 137 134*   POTASSIUM mmol/L 3.4* 3.5 4.0   CHLORIDE mmol/L 104 102 95*   CO2 mmol/L 28 26 33*   BUN mg/dL 9 14 17   CREATININE mg/dL 0.85 1.26* 1.47*   CALCIUM mg/dL 7.1* 7.3* 7.7*   PROTEIN TOTAL g/dL  --  5.8* 5.9*   BILIRUBIN TOTAL mg/dL  --  0.5 0.4   ALK PHOS U/L  --  40 48   ALT U/L  --  4* 5*   AST U/L  --  12 12   GLUCOSE mg/dL 82 79 95           TROPHS   Date/Time Value Ref Range Status   10/14/2023 06:56  0 - 13 ng/L Final     Comment:     Previous result verified on 10/13/2023 2019 on specimen/case [id number] called with component [facility] for procedure Troponin I, High Sensitivity with value 813 ng/L.   10/14/2023 02:41  0 - 13 ng/L Final     Comment:     Previous result verified on 10/13/2023 2019 on specimen/case [id number] called with component TRPHS for procedure Troponin I, High Sensitivity with value 813 ng/L.   10/14/2023 12:23  0 - 13 ng/L Final     Comment:     Previous result verified on 10/13/2023 2019 on specimen/case [id number] called with component TRPHS for procedure Troponin I, High  Sensitivity with value 813 ng/L.     BNP   Date/Time Value Ref Range Status   10/14/2023 06:53  0 - 99 pg/mL Final   10/01/2023 01:00  0 - 99 pg/mL Final     HGBA1C   Date/Time Value Ref Range Status   03/25/2022 04:59 AM 5.4 % Final     Comment:          Diagnosis of Diabetes-Adults   Non-Diabetic: < or = 5.6%   Increased risk for developing diabetes: 5.7-6.4%   Diagnostic of diabetes: > or = 6.5%  .       Monitoring of Diabetes                Age (y)     Therapeutic Goal (%)   Adults:          >18           <7.0   Pediatrics:    13-18           <7.5                   7-12           <8.0                   0- 6            7.5-8.5   American Diabetes Association. Diabetes Care 33(S1), Jan 2010.     03/07/2021 05:41 AM 7.2 % Final     Comment:          Diagnosis of Diabetes-Adults   Non-Diabetic: < or = 5.6%   Increased risk for developing diabetes: 5.7-6.4%   Diagnostic of diabetes: > or = 6.5%  .       Monitoring of Diabetes                Age (y)     Therapeutic Goal (%)   Adults:          >18           <7.0   Pediatrics:    13-18           <7.5                   7-12           <8.0                   0- 6            7.5-8.5   American Diabetes Association. Diabetes Care 33(S1), Jan 2010.       VLDL   Date/Time Value Ref Range Status   03/24/2022 06:01 AM 22 0 - 40 mg/dL Final   11/18/2020 10:34 AM 28 0 - 40 mg/dL Final   09/20/2019 09:53 AM 25 0 - 40 mg/dL Final      Last I/O:  I/O last 3 completed shifts:  In: 1350 (27 mL/kg) [I.V.:1000 (20 mL/kg); IV Piggyback:350]  Out: 575 (11.5 mL/kg) [Urine:575 (0.3 mL/kg/hr)]  Weight: 50 kg     Past Cardiology Tests :   1. Left ventricular systolic function is normal with a 60-65% estimated ejection fraction.   2. There is moderate concentric left ventricular hypertrophy.   3. Moderate to severely elevated right ventricular systolic pressure.   4. Aortic valve appears abnormal.   5. Aortic valve: Peak gradient 65.1 mmHg, mean gradient 41 mmHg, DHAVAL 0.7cm2.   6.  "Severe aortic valve stenosis.    Inpatient Medications:  Scheduled medications   Medication Dose Route Frequency    acetaminophen  650 mg oral TID    albuterol  2.5 mg nebulization TID    aspirin  81 mg oral Daily    atorvastatin  40 mg oral Nightly    budesonide  0.5 mg nebulization BID    cholecalciferol  50 mcg oral BID    DULoxetine  30 mg oral Daily    ferrous sulfate  65 mg of iron oral Daily    formoterol  20 mcg nebulization BID    gabapentin  200 mg oral TID    ketotifen  1 drop Both Eyes BID    lactobacillus acidophilus  1 capsule oral Daily    [Held by provider] losartan  50 mg oral Daily    melatonin  4.5 mg oral Nightly    metoprolol succinate XL  12.5 mg oral Daily    montelukast  10 mg oral Daily    pantoprazole  40 mg oral Daily before breakfast    piperacillin-tazobactam  3.375 g intravenous q6h    polyethylene glycol  17 g oral Daily    sennosides  1 tablet oral BID    sildenafil  20 mg oral TID    vancomycin  1,000 mg intravenous q24h     PRN medications   Medication    acetaminophen    acetaminophen    acetaminophen    albuterol    benzonatate    bisacodyl    guaiFENesin    heparin    ondansetron    Or    ondansetron    oxyCODONE-acetaminophen    oxygen     Continuous Medications   Medication Dose Last Rate    heparin  0-4,000 Units/hr 1,200 Units/hr (10/15/23 0407)    sodium chloride 0.9%  125 mL/hr 125 mL/hr (10/15/23 1108)       Physical Exam:  BP 94/57   Pulse 74   Temp 36.5 °C (97.7 °F)   Resp 16   Ht 1.6 m (5' 2.99\")   Wt 50 kg (110 lb 4.8 oz)   SpO2 90%   BMI 19.54 kg/m²     General Appearance:  Alert, cooperative, no distress,   She is rather frail.  She has moist productive cough today   Head:  Normocephalic, without obvious abnormality, atraumatic   Eyes:  PERRL, conjunctiva/corneas clear, EOM's intact, fundi benign, both eyes   Ears:  Normal xternal ear canals, both ears   Nose: Nares normal,    Throat: Lips, mucosa, and tongue normal   Neck: Supple, sno carotid bruit or JVD "   Back:   Symmetric, no curvature, ROM normal, no CVA tenderness   Lungs:   Coarse breath sounds bilaterally across anterior posterior fields.  Referred murmur is heard in the posterior as well   Heart:  Regular rate and rhythm, S1 and S2 normal, III/VI ALEJANDRA at RUSB    Abdomen:   Soft, non-tender, bowel sounds active all four quadrants,  no masses, no organomegaly   Extremities: Extremities normal, atraumatic, no cyanosis or edema   Pulses: 2+ and symmetric   Skin: Skin color, texture, turgor normal, no rashes or lesions   Lymph nodes: Cervical, supraclavicular, and axillary nodes normal   Neurologic: Normal          Assessment/Plan   Norah Cleaning is a 83 y.o. female with past medical history significant for hypertension, chronic diastolic heart failure, COPD on chronic home oxygen, emphysema, new diagnosis of lung cancer, moderate AS ,obstructive sleep apnea, AAA, nonobstructive CAD per cardiac cath 2008 presenting to emergency room complaining of worsening of dyspnea from facility, concerns for pneumonia.  Lab work remarkable for elevated troponin with relatively flat trend at 813, 966, 791, AURA with Cr 1.46( 0.94 on 10/6/23).  Cardiology is consulted for further evaluation of non-ST elevation myocardial infarction.  She was a started on broad-spectrum antibiotics, was given 2 mg of IV fluids, chest x-ray with reported new mass but no clear pneumonia.  She was also started on heparin drip.        I reviewed ECG.  Sinus rhythm, nonspecific T wave abnormality, LVH.  No acute ischemic changes.  I reviewed telemetry.  Patient currently is not on telemetry.  I reviewed chest x-ray radiology image interpretation.     1.  Non-ST elevation myocardial infarction Nil     Elevated troponin does not suggest acute MI pattern, higher degree of elevation however relatively flat trend.  Patient denies any chest pain and EKG negative for any acute ischemic changes however mention of three-vessel coronary artery disease per  CT chest from earlier in October.  Troponin Elevation without Myocardial Infarction occurring in the setting of Aortic Stenosis, Chronic Hypoxic Respiratory Failure and known Severe Concentric LVH by Prior echocardiogramand known Multi-Vessel CAD by Ct Imaging with superimposed presumed bacterial pneumonia  Given diagnosis of lung cancer we will check VTE exclusion D-dimer and if elevated will need to rule out pulmonary embolism.  For now we will continue heparin drip and final recommendation pending results from VTE exclusion D-dimer and imaging if indicated as well as echocardiogram     2.  Aortic stenosis  Previously reported as moderate per echocardiogram back in March 2022  Severe by Parameters.   Certainly contributing to Troponin Elevation            3.  Shortness of breath  Euvolemic on exam and per imaging  Suspect secondary to pneumonia given reported fever as well as productive cough with greenish-yellowish sputum  Also with lung cancer, VTE exclusion D-dimer as above, echo as above  Checking a BNP        4.  Reported history of chronic diastolic heart failure  Euvolemic at present as #3  Net IO Since Admission: 775 mL [10/15/23 1506]          Peripheral IV 10/13/23 22 G Right;Anterior Forearm (Active)   Site Assessment Intact;Clean;Dry 10/15/23 0000   Dressing Status Clean;Dry;Occlusive 10/15/23 0000   Number of days: 2       Peripheral IV 10/13/23 22 G Left;Ventral Wrist (Active)   Site Assessment Dry;Intact 10/15/23 0000   Dressing Status Clean;Dry;Occlusive 10/15/23 0000   Number of days: 2       Code Status:  DNR    I spent 35 minutes in the professional and overall care of this patient.        Suleiman Gregorio DO

## 2023-10-15 NOTE — PROGRESS NOTES
"Vancomycin Dosing by Pharmacy- INITIAL    Norah Cleaning is a 83 y.o. year old female who Pharmacy has been consulted for vancomycin dosing for pneumonia. Based on the patient's indication and renal status this patient will be dosed based on a goal AUC of 400-600.     Renal function is currently stable.    Visit Vitals  BP 98/54   Pulse 92   Temp 36.1 °C (97 °F)   Resp 18        Lab Results   Component Value Date    CREATININE 0.85 10/15/2023    CREATININE 1.26 (H) 10/14/2023    CREATININE 1.47 (H) 10/13/2023    CREATININE 0.94 10/06/2023        Patient weight is No results found for: \"PTWEIGHT\"    No results found for: \"CULTURE\"     I/O last 3 completed shifts:  In: 1350 (27 mL/kg) [I.V.:1000 (20 mL/kg); IV Piggyback:350]  Out: 575 (11.5 mL/kg) [Urine:575 (0.3 mL/kg/hr)]  Weight: 50 kg   [unfilled]    Lab Results   Component Value Date    PATIENTTEMP 37.0 10/13/2023    PATIENTTEMP 37.0 03/23/2022    PATIENTTEMP 37.0 03/23/2022          Assessment/Plan     Patient has already been given a loading dose of 1000 mg.  Will initiate vancomycin maintenance,  1000 mg every 24 hours.    This dosing regimen is predicted by [employer] to result in the following pharmacokinetic parameters:  Regimen: 1000 mg IV every 24 hours.  Start time: 09:55 on 10/15/2023  Exposure target: AUC24 (range)400-600 mg/L.hr   AUC24,ss: 473 mg/L.hr  Probability of AUC24 > 400: 80 %  Ctrough,ss: 14.1 mg/L  Probability of Ctrough,ss > 20: 9 %  Probability of nephrotoxicity ([place] PIPPA 2009): 9 %    Follow-up level will be ordered on 10/16 at 0500 unless clinically indicated sooner.  Will continue to monitor renal function daily while on vancomycin and order serum creatinine at least every 48 hours if not already ordered.  Follow for continued vancomycin needs, clinical response, and signs/symptoms of toxicity.       Marcia Fisher, PharmD       "

## 2023-10-16 PROBLEM — C34.91 SQUAMOUS CELL CARCINOMA OF RIGHT LUNG (MULTI): Status: ACTIVE | Noted: 2023-01-01

## 2023-10-16 PROBLEM — I27.20 PULMONARY HTN (MULTI): Status: ACTIVE | Noted: 2023-01-01

## 2023-10-16 PROBLEM — R79.89 TROPONIN LEVEL ELEVATED: Status: ACTIVE | Noted: 2023-01-01

## 2023-10-16 PROBLEM — I35.0 AORTIC STENOSIS, SEVERE: Status: ACTIVE | Noted: 2023-01-01

## 2023-10-16 NOTE — HOSPITAL COURSE
83 yoF who returns with SOB. 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 She is on 3.5 L of oxygen at home.     Troponin elevated to several hundreds. CXR with questionable infiltrate. VQ scan indeterminate. She is on heparin drip. ID and cardio consulted.     10/17: CT chest w/o PE, so heparin stopped; does show increased size of lung mass with new effusions/GGO. IV lasix started. Antibiotics switched to levofloxacin.   10/18: Seems a bit better today. Continue current regimen. CT abd and MRI brain do not show any other areas of cancer.   10/19: She says her breathing feels a bit worse today. Will consult pulm today. Continue lasix and levofloxacin. Of note, PM lasix has been held the past 2 nights due to low BP.     10/22: Her breathing feels better; lungs sound better today. Cardio has recommended switching to torsemide; pulm recommended continuing antibiotic. Referral made for expedited oncology appt, hopefully for this coming week. She likely has post-obstructive pneumonia from her tumor, so will continue her on levofloxacin this week. Discharged back to her [facility] in stable condition.

## 2023-10-16 NOTE — PROGRESS NOTES
"Subjective Data:    Still with SOB & ongoing productive cough   Not feeling much better, chest pain only with excessive cough  No palpitations  Made aware of  severe Aortic stenosis     Overnight Events:    None      Objective Data:  Last Recorded Vitals:  Vitals:    10/16/23 0331 10/16/23 0654 10/16/23 0749 10/16/23 0803   BP: 167/68   156/68   BP Location: Left arm      Patient Position: Lying      Pulse: 92   75   Resp: 20   18   Temp: 36.8 °C (98.2 °F)   36.6 °C (97.8 °F)   TempSrc: Oral      SpO2: 96%  97% 95%   Weight:  53 kg (116 lb 14.4 oz)     Height:           LABS:  CMP:  Results from last 7 days   Lab Units 10/16/23  0758 10/15/23  0623 10/14/23  0653 10/13/23  1937   SODIUM mmol/L 136 138 137 134*   POTASSIUM mmol/L 4.0 3.4* 3.5 4.0   CHLORIDE mmol/L 104 104 102 95*   CO2 mmol/L 29 28 26 33*   ANION GAP mmol/L 7* 9* 13 10   BUN mg/dL 7 9 14 17   CREATININE mg/dL 0.74 0.85 1.26* 1.47*   EGFR mL/min/1.73m*2 81 69 43* 35*   MAGNESIUM mg/dL  --   --  1.90  --    ALBUMIN g/dL  --   --  3.0* 3.3*   ALT U/L  --   --  4* 5*   AST U/L  --   --  12 12   BILIRUBIN TOTAL mg/dL  --   --  0.5 0.4     CBC:  Results from last 7 days   Lab Units 10/16/23  0758 10/14/23  0653 10/13/23  1937   WBC AUTO x10*3/uL 8.2 11.6* 12.2*   HEMOGLOBIN g/dL 7.8* 9.0* 8.8*   HEMATOCRIT % 25.8* 31.4* 27.7*   PLATELETS AUTO x10*3/uL 268 296 324   MCV fL 98 104* 95     COAG:     ABO: No results found for: \"ABO\"  HEME/ENDO:  Results from last 7 days   Lab Units 10/14/23  0653   TSH mIU/L 2.12      CARDIAC:   Results from last 7 days   Lab Units 10/14/23  0656 10/14/23  0653 10/14/23  0241 10/14/23  0023 10/13/23  1937   TROPHS ng/L 477*  --  791* 966* 813*   BNP pg/mL  --  541*  --   --   --           Last I/O:  I/O last 3 completed shifts:  In: 1560 (29.4 mL/kg) [I.V.:1560 (29.4 mL/kg)]  Out: 577 (10.9 mL/kg) [Urine:577 (0.3 mL/kg/hr)]  Weight: 53 kg       Echocardiogram this admission     1. Left ventricular systolic function is normal " with a 60-65% estimated ejection fraction.   2. There is moderate concentric left ventricular hypertrophy.   3. Moderate to severely elevated right ventricular systolic pressure.   4. Aortic valve appears abnormal.   5. Aortic valve: Peak gradient 65.1 mmHg, mean gradient 41 mmHg, DHAVAL 0.7cm2.   6. Severe aortic valve stenosis.    Inpatient Medications:  Scheduled medications   Medication Dose Route Frequency    acetaminophen  650 mg oral TID    albuterol  2.5 mg nebulization TID    aspirin  81 mg oral Daily    atorvastatin  40 mg oral Nightly    azithromycin  500 mg oral Daily    Followed by    [START ON 10/17/2023] azithromycin  250 mg oral Daily    budesonide  0.5 mg nebulization BID    cholecalciferol  50 mcg oral BID    DULoxetine  30 mg oral Daily    ferrous sulfate  65 mg of iron oral Daily    formoterol  20 mcg nebulization BID    gabapentin  200 mg oral TID    ketotifen  1 drop Both Eyes BID    lactobacillus acidophilus  1 capsule oral Daily    [Held by provider] losartan  50 mg oral Daily    melatonin  4.5 mg oral Nightly    metoprolol succinate XL  12.5 mg oral Daily    montelukast  10 mg oral Daily    pantoprazole  40 mg oral Daily before breakfast    polyethylene glycol  17 g oral Daily    sennosides  1 tablet oral BID    sildenafil  20 mg oral TID     PRN medications   Medication    acetaminophen    acetaminophen    acetaminophen    albuterol    benzonatate    bisacodyl    guaiFENesin    heparin    ondansetron    Or    ondansetron    oxyCODONE-acetaminophen    oxygen     Continuous Medications   Medication Dose Last Rate    heparin  0-4,000 Units/hr 1,200 Units/hr (10/16/23 0646)    [Held by provider] sodium chloride 0.9%  125 mL/hr 125 mL/hr (10/15/23 1108)       Physical Exam:    Physical Exam  Vitals reviewed.   Constitutional:       Appearance: Frail  Eyes:      Pupils: Pupils are equal, round, and reactive to light.   Cardiovascular:      Rate and Rhythm: Normal rate and regular rhythm.       Pulses: Normal pulses.      Heart sounds: + Systolic murmur most consistent with aortic stenosis  Pulmonary:      Effort: Short of breath     Breath sounds: Diminished with rhonchi  Abdominal:      General: Abdomen is flat. Bowel sounds are normal.      Palpations: Abdomen is soft.   Musculoskeletal:         General: Normal range of motion.   Skin:     General: Skin is warm and dry.      Capillary Refill: Capillary refill takes less than 2 seconds.   Neurological:      General: No focal deficit present.      Mental Status: Alert, awake and oriented x 3.  Psychiatric:         Mood and Affect: Mood normal.      Assessment/Plan     Norah Cleaning is a 83 y.o. female with past medical history significant for hypertension, chronic diastolic heart failure, COPD on chronic home oxygen, emphysema, new diagnosis of lung cancer, moderate AS ,obstructive sleep apnea, AAA, nonobstructive CAD per cardiac cath 2008 presenting to emergency room complaining of worsening of dyspnea from facility, concerns for pneumonia.  Lab work remarkable for elevated troponin with relatively flat trend at 813, 966, 791, AURA with Cr 1.46( 0.94 on 10/6/23).  Cardiology is consulted for further evaluation of non-ST elevation myocardial infarction.  She was a started on broad-spectrum antibiotics, was given 2 mg of IV fluids, chest x-ray with reported new mass but no clear pneumonia.  She was also started on heparin drip.       I reviewed telemetry.  Patient currently is not on telemetry.  I reviewed chest x-ray radiology image interpretation as well as VQ report, echo report, labs, vital signs.     1.  Non-ST elevation myocardial infarction Nil     Elevated troponin does not suggest acute MI pattern, higher degree of elevation however relatively flat trend.  Patient denies any chest pain and EKG negative for any acute ischemic changes however mention of three-vessel coronary artery disease per CT chest from earlier in October.  Given diagnosis  of lung cancer,  VTE exclusion D-dimer checked, elevated at 17 00, VQ inderminate, Cr back to normal today.    For now we will continue heparin drip and final recommendation pending CT PE rule out protocol      2.  Aortic stenosis  Previously reported as moderate per echocardiogram back in March 2022 now severe per echo this admission.  For eval as out pt, consideration of TAVR however pending recovery, not a candidate at present with acute illness and recent diagnosis of Lung CA.        3.  Shortness of breath  Euvolemic on exam and per imaging  Suspect secondary to pneumonia given reported fever as well as productive cough with greenish-yellowish sputum  Also with lung cancer  BNP elevated  Echo with normal EF and RV overload likely given her long standing lung disease  CT chest PE rule out given inderminate VQ as well as Cr back to normal now , will decide on diuresis pending results Cont Heparin gtt for now       4.  Reported history of chronic diastolic heart failure  Euvolemic at present as #3     She previously followed with Dr. Holbrook for cardiology care however last visit was in 2022.           Code Status:  DNLETTY Chaudhary, APRN-CNP

## 2023-10-16 NOTE — PROGRESS NOTES
Occupational Therapy                 Therapy Communication Note    Patient Name: Norah Cleaning  MRN: [id number]  Today's Date: 10/16/2023     Discipline: Occupational Therapy    Missed Visit Reason: Missed Visit Reason: Other (Comment) (Pt. off floor at CT to r/o/ chest PE per RN. OT to defer session at this time.)    Missed Time: Attempt

## 2023-10-16 NOTE — CONSULTS
Vancomycin Dosing by Pharmacy- Cessation of Therapy    Consult to pharmacy for vancomycin dosing has been discontinued by the prescriber, pharmacy will sign off at this time.    Please call pharmacy if there are further questions or re-enter a consult if vancomycin is resumed.     Isabel Covarrubias, CynD

## 2023-10-16 NOTE — PROGRESS NOTES
"Norah Cleaning is a 83 y.o. female on day 2 of admission presenting with Acute pneumonia.    Assessment/Plan   83 yoF who returns with SOB. 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 She is on 3.5 L of oxygen at home.     Troponin elevated to several hundreds. CXR with questionable infiltrate. VQ scan indeterminate. She is on heparin drip. ID and cardio consulted.     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 Still has some SOB and chest pain on right side.       Objective   Physical Exam  Cardiovascular:      Rate and Rhythm: Normal rate and regular rhythm.      Heart sounds: Murmur heard.   Pulmonary:      Breath sounds: Rales present.   Abdominal:      General: Bowel sounds are normal.      Palpations: Abdomen is soft.   Musculoskeletal:         General: Normal range of motion.   Neurological:      General: No focal deficit present.      Mental Status: She is alert and oriented to person, place, and time.   Psychiatric:         Mood and Affect: Mood normal.         Last Recorded Vitals  Blood pressure 122/62, pulse 84, temperature 36.4 °C (97.6 °F), resp. rate 18, height 1.6 m (5' 2.99\"), weight 53 kg (116 lb 14.4 oz), SpO2 94 %.  Intake/Output last 3 Shifts:  I/O last 3 completed shifts:  In: 1560 (29.4 mL/kg) [I.V.:1560 (29.4 mL/kg)]  Out: 577 (10.9 mL/kg) [Urine:577 (0.3 " mL/kg/hr)]  Weight: 53 kg     Relevant Results                           I spent 40 minutes in the professional and overall care of this patient.      Jack Ortez MD

## 2023-10-16 NOTE — PROGRESS NOTES
"Physical Therapy                 Therapy Communication Note    Patient Name: Norah Cleaning  MRN: [id number]  Today's Date: 10/16/2023     Discipline: Physical Therapy    Missed Visit Reason: Missed Visit Reason: Patient refused (Pt's chart reviewed and cleared to participate with RN. Pt refusing all mobility on arrival depsite max encouragement. Pt reporting she is too fatigued to participate, repeatedly stating \"Not today.\")    Missed Time: Attempt      "

## 2023-10-16 NOTE — CONSULTS
INFECTIOUS DISEASE INPATIENT INITIAL CONSULTATION    Referred By: Pedro Morrison    Reason For Consult: Cavitary lesion, recent biopsy, here with infiltrats and dry cough, + mrsa... do you think this is really PNA? Please and thank you     HPI:  This is a 83 y.o. female with PMH of CAD, COPD, HTN, diastolic HF, AAA who presented with shortness of breath and cough.    Patient has chronic shortness of breath worse in the last couple of days. Cough with yellow/green sputum which she says is more for 2 weeks now. Denies fevers/chills. No chest pain, n/v/c/d, abd pain, urinary issues.    She was found to have a RUL lung mass that was biopsied 10/5/23 - invasive squamous cell carcinoma.     So far here is afebrile and without leukocytosis. Is on IV Vanc/Zosyn for possible PNA.    Allergies:  Patient has no known allergies.     Vitals (Last 24 Hours):  Heart Rate:  [64-92]   Temp:  [36.4 °C (97.5 °F)-37 °C (98.6 °F)]   Resp:  [16-20]   BP: ()/(56-68)   Weight:  [53 kg (116 lb 14.4 oz)]   SpO2:  [90 %-99 %]      PHYSICAL EXAM:  Gen - NAD, 2L NC  Heart - RRR, no murmurs  Lungs - clear bilaterally, no wheezing  Abd - soft, no ttp, BS present  Ext - no LE edema  Skin - no rash    MEDS:    Current Facility-Administered Medications:     acetaminophen (Tylenol) suppository 650 mg, 650 mg, rectal, q4h PRN, Herminia Porter MD    acetaminophen (Tylenol) tablet 650 mg, 650 mg, oral, TID, Herminia Porter MD, 650 mg at 10/15/23 1805    acetaminophen (Tylenol) tablet 650 mg, 650 mg, oral, q4h PRN, Herminia Porter MD    acetaminophen (Tylenol) tablet 650 mg, 650 mg, oral, q4h PRN, Herminia Porter MD    albuterol 2.5 mg /3 mL (0.083 %) nebulizer solution 2.5 mg, 2.5 mg, nebulization, q6h PRN, Herminia Porter MD    albuterol 2.5 mg /3 mL (0.083 %) nebulizer solution 2.5 mg, 2.5 mg, nebulization, TID, Pedro Morrison MD, 2.5 mg at 10/16/23 0749    aspirin chewable tablet 81 mg, 81 mg, oral, Daily, Herminia Porter MD, 81 mg at 10/15/23  0933    atorvastatin (Lipitor) tablet 40 mg, 40 mg, oral, Nightly, Herminia Porter MD, 40 mg at 10/15/23 2046    benzonatate (Tessalon) capsule 100 mg, 100 mg, oral, TID PRN, Herminia Porter MD, 100 mg at 10/16/23 0629    bisacodyl (Dulcolax) suppository 10 mg, 10 mg, rectal, Once PRN, Herminia Porter MD    budesonide (Pulmicort) 0.5 mg/2 mL nebulizer solution 0.5 mg, 0.5 mg, nebulization, BID, Herminia Porter MD, 0.5 mg at 10/16/23 0749    cholecalciferol (Vitamin D-3) tablet 50 mcg, 50 mcg, oral, BID, Herminia Porter MD, 50 mcg at 10/15/23 2047    DULoxetine (Cymbalta) DR capsule 30 mg, 30 mg, oral, Daily, Herminia Porter MD, 30 mg at 10/15/23 0933    ferrous sulfate 325 (65 Fe) MG tablet 65 mg of iron, 65 mg of iron, oral, Daily, Herminia Porter MD, 65 mg of iron at 10/15/23 0933    formoterol (Perforomist) 20 mcg/2 mL nebulizer solution 20 mcg, 20 mcg, nebulization, BID, Herminia Porter MD, 20 mcg at 10/16/23 0749    gabapentin (Neurontin) capsule 200 mg, 200 mg, oral, TID, Herminia Porter MD, 200 mg at 10/15/23 1805    guaiFENesin (Robitussin) 100 mg/5 mL syrup 200 mg, 200 mg, oral, q4h PRN, Herminia Porter MD, 200 mg at 10/15/23 0937    heparin (porcine) injection 1,500-3,000 Units, 1,500-3,000 Units, intravenous, q4h PRN, Herminia Porter MD    heparin 25,000 Units in dextrose 5% 250 mL (100 Units/mL) infusion (premix), 0-4,000 Units/hr, intravenous, Continuous, Herminia Porter MD, Last Rate: 12 mL/hr at 10/16/23 0646, 1,200 Units/hr at 10/16/23 0646    ketotifen (Zaditor) 0.025 % (0.035 %) ophthalmic solution 1 drop, 1 drop, Both Eyes, BID, Herminia Porter MD, 1 drop at 10/15/23 2048    lactobacillus acidophilus capsule 1 capsule, 1 capsule, oral, Daily, Herminia Porter MD, 1 capsule at 10/15/23 0900    [Held by provider] losartan (Cozaar) tablet 50 mg, 50 mg, oral, Daily, Herminia Porter MD    melatonin tablet 4.5 mg, 4.5 mg, oral, Nightly, Herminia Porter MD, 4.5 mg at 10/15/23 2047    metoprolol succinate XL (Toprol-XL)  24 hr tablet 12.5 mg, 12.5 mg, oral, Daily, Herminia Porter MD    montelukast (Singulair) tablet 10 mg, 10 mg, oral, Daily, Herminia Porter MD, 10 mg at 10/15/23 0933    ondansetron (Zofran) tablet 4 mg, 4 mg, oral, q8h PRN **OR** ondansetron (Zofran) injection 4 mg, 4 mg, intravenous, q8h PRN, Herminia Porter MD    oxyCODONE-acetaminophen (Percocet) 5-325 mg per tablet 1 tablet, 1 tablet, oral, q4h PRN, Hermniia Porter MD, 1 tablet at 10/16/23 0629    oxygen (O2) therapy, , inhalation, Continuous PRN - O2/gases, Pedro Morrison MD, 3.5 L/min at 10/16/23 0749    pantoprazole (ProtoNix) EC tablet 40 mg, 40 mg, oral, Daily before breakfast, Herminia Porter MD, 40 mg at 10/16/23 0629    piperacillin-tazobactam-dextrose (Zosyn) IV 3.375 g, 3.375 g, intravenous, q6h, Herminia Porter MD, Stopped at 10/16/23 0659    polyethylene glycol (Glycolax, Miralax) packet 17 g, 17 g, oral, Daily, Herminia Porter MD, 17 g at 10/14/23 0950    sennosides (Senokot) tablet 8.6 mg, 1 tablet, oral, BID, Herminia Porter MD, 8.6 mg at 10/15/23 2047    sildenafil (Revatio) tablet 20 mg, 20 mg, oral, TID, Herminia Porter MD, 20 mg at 10/15/23 2047    [Held by provider] sodium chloride 0.9% infusion, 125 mL/hr, intravenous, Continuous, Pedro Morrison MD, Last Rate: 125 mL/hr at 10/15/23 1108, 125 mL/hr at 10/15/23 1108    vancomycin in dextrose 5 % (Vancocin) IVPB 1,000 mg, 1,000 mg, intravenous, q24h, Herminia Porter MD, Stopped at 10/15/23 1208     LABS:  Lab Results   Component Value Date    WBC 8.2 10/16/2023    HGB 7.8 (L) 10/16/2023    HCT 25.8 (L) 10/16/2023    MCV 98 10/16/2023     10/16/2023      Results from last 72 hours   Lab Units 10/16/23  0758   SODIUM mmol/L 136   POTASSIUM mmol/L 4.0   CHLORIDE mmol/L 104   CO2 mmol/L 29   BUN mg/dL 7   CREATININE mg/dL 0.74   GLUCOSE mg/dL 75   CALCIUM mg/dL 7.4*   ANION GAP mmol/L 7*   EGFR mL/min/1.73m*2 81     Results from last 72 hours   Lab Units 10/14/23  0653   ALK PHOS U/L 40    BILIRUBIN TOTAL mg/dL 0.5   PROTEIN TOTAL g/dL 5.8*   ALT U/L 4*   AST U/L 12   ALBUMIN g/dL 3.0*     Estimated Creatinine Clearance: 48.5 mL/min (by C-G formula based on SCr of 0.74 mg/dL).  CRP   Date/Time Value Ref Range Status   10/25/2022 06:27 PM 40.65 (A) mg/dL Final     Comment:     REF VALUE  < 1.00     03/06/2021 04:55 AM 4.84 (A) mg/dL Final     Comment:     REF VALUE  < 1.00     03/04/2021 06:12 AM 0.50 mg/dL Final     Comment:     REF VALUE  < 1.00       Sedimentation Rate   Date/Time Value Ref Range Status   12/17/2019 12:27 PM 23 0 - 30 mm/h Final   05/20/2019 10:47 AM 13 0 - 30 mm/h Final   05/10/2019 10:08 AM 65 (H) 0 - 30 mm/h Final       IMAGING:  CXR 10/14  No focal consolidation.     Right upper lobe paraspinal cavitary lesion is obscured and better   assessed on the prior CT.     ASSESSMENT/PLAN:    Bronchitis - CXR without infiltrate to suggest PNA. Has cough lasting for 2 weeks with increased sputum production so will treat as a bronchitis.  RUL SCC - recent diagnosis, not on therapy for this yet    Stopped IV Vanc/Zosyn. Starting on Azithromycin 500mg today and then 250mg for 4 days. Symptoms might be more from malignancy. If she is not improving would suggest repeat CXR or CT potentially. Also sputum culture if still with productive cough.    Will sign off. Please call back with questions. Thanks!    Harpal Cool MD  [facility]  Office #[phone]

## 2023-10-16 NOTE — PROGRESS NOTES
10/16/23 1112   Discharge Planning   Living Arrangements Other (Comment)  ([facility])   Support Systems Family members   Assistance Needed relies on care from others   Type of Residence Nursing home/residential care   Do you have animals or pets at home? No   Home or Post Acute Services Post acute facilities (Rehab/SNF/etc)   Type of Post Acute Facility Services Long term care   Patient expects to be discharged to: [facility]   Does the patient need discharge transport arranged? Yes   RoundTrip coordination needed? Yes       Met with patient at bedside, she confirms she is LTC @ [facility] and will return there when medically ready for dc, referral placed by [facility]. No barriers for her to return.

## 2023-10-17 PROBLEM — I50.33 ACUTE ON CHRONIC DIASTOLIC HEART FAILURE (MULTI): Status: ACTIVE | Noted: 2023-01-01

## 2023-10-17 PROBLEM — I5A NON-ISCHEMIC MYOCARDIAL INJURY (NON-TRAUMATIC): Status: ACTIVE | Noted: 2023-01-01

## 2023-10-17 NOTE — PROGRESS NOTES
Norah Cleaning is a 83 y.o. female on day 3 of admission presenting with Acute pneumonia.    Patient remains on 5 liters NC, ESTEVEZ and positive wet cough.   Dtr at bedside when TCC had discussion regarding d cplanning.   Patient from [facility].   MD wants PT OT evals today.   Patient is independent in ADLS at LTC, uses wheel chair and walker.   MD to discuss with [facility] at [facility] if patient is candidate for radiation.   Family may be interested in palliative consult pending that decision.   Will follow up this aftenroon.

## 2023-10-17 NOTE — PROGRESS NOTES
Subjective Data:  Still with significant exertional dyspnea  Feeling weak with productive cough  Responding well to IV lasix     Overnight Events:    N/a      Objective Data:  Last Recorded Vitals:  Vitals:    10/17/23 0717 10/17/23 0900 10/17/23 1238 10/17/23 1358   BP:  148/66 145/68    BP Location:  Left arm Left arm    Patient Position:   Lying    Pulse:  84 63    Resp:  20 19    Temp:  36.7 °C (98.1 °F) 36.2 °C (97.2 °F)    TempSrc:  Oral Temporal    SpO2: 97% 93% 96% 93%   Weight:       Height:           LABS:  CMP:  Results from last 7 days   Lab Units 10/17/23  0607 10/16/23  0758 10/15/23  0623 10/14/23  0653 10/13/23  1937   SODIUM mmol/L 136 136 138 137 134*   POTASSIUM mmol/L 4.0 4.0 3.4* 3.5 4.0   CHLORIDE mmol/L 102 104 104 102 95*   CO2 mmol/L 26 29 28 26 33*   ANION GAP mmol/L 12 7* 9* 13 10   BUN mg/dL 7 7 9 14 17   CREATININE mg/dL 0.83 0.74 0.85 1.26* 1.47*   EGFR mL/min/1.73m*2 70 81 69 43* 35*   MAGNESIUM mg/dL  --  1.60  --  1.90  --    ALBUMIN g/dL  --   --   --  3.0* 3.3*   ALT U/L  --   --   --  4* 5*   AST U/L  --   --   --  12 12   BILIRUBIN TOTAL mg/dL  --   --   --  0.5 0.4     CBC:  Results from last 7 days   Lab Units 10/17/23  0607 10/16/23  0758 10/14/23  0653 10/13/23  1937   WBC AUTO x10*3/uL 11.3 8.2 11.6* 12.2*   HEMOGLOBIN g/dL 8.9* 7.8* 9.0* 8.8*   HEMATOCRIT % 30.2* 25.8* 31.4* 27.7*   PLATELETS AUTO x10*3/uL 333 268 296 324   MCV fL 99 98 104* 95     COAG:     ABO:   ABO TYPE   Date Value Ref Range Status   10/17/2023 O  Final     HEME/ENDO:  Results from last 7 days   Lab Units 10/16/23  1442 10/16/23  1441 10/14/23  0653   FERRITIN ng/mL 148  --   --    IRON SATURATION %  --  11*  --    TSH mIU/L  --   --  2.12      CARDIAC:   Results from last 7 days   Lab Units 10/14/23  0656 10/14/23  0653 10/14/23  0241 10/14/23  0023 10/13/23  1937   TROPHS ng/L 477*  --  791* 966* 813*   BNP pg/mL  --  541*  --   --   --            Inpatient Medications:  Scheduled medications    Medication Dose Route Frequency    acetaminophen  650 mg oral TID    albuterol  2.5 mg nebulization TID    aspirin  81 mg oral Daily    atorvastatin  40 mg oral Nightly    budesonide  0.5 mg nebulization BID    cholecalciferol  50 mcg oral BID    DULoxetine  30 mg oral Daily    enoxaparin  40 mg subcutaneous Daily    [Held by provider] ferrous sulfate  65 mg of iron oral Daily    formoterol  20 mcg nebulization BID    furosemide  40 mg intravenous q12h    gabapentin  200 mg oral TID    guaiFENesin  600 mg oral BID    ketotifen  1 drop Both Eyes BID    lactobacillus acidophilus  1 capsule oral Daily    levoFLOXacin  750 mg intravenous q48h    [Held by provider] losartan  50 mg oral Daily    melatonin  4.5 mg oral Nightly    metoprolol succinate XL  12.5 mg oral Daily    montelukast  10 mg oral Daily    pantoprazole  40 mg oral Daily before breakfast    polyethylene glycol  17 g oral Daily    sennosides  1 tablet oral BID    sildenafil  20 mg oral TID     PRN medications   Medication    acetaminophen    acetaminophen    acetaminophen    albuterol    benzonatate    bisacodyl    guaiFENesin    ondansetron    Or    ondansetron    oxyCODONE-acetaminophen    oxygen     Continuous Medications   Medication Dose Last Rate    [Held by provider] sodium chloride 0.9%  125 mL/hr 125 mL/hr (10/15/23 1108)       Physical Exam  Vitals reviewed.   Constitutional:       Appearance: Frail  Eyes:      Pupils: Pupils are equal, round, and reactive to light.   Cardiovascular:      Rate and Rhythm: Normal rate and regular rhythm.      Pulses: Normal pulses.      Heart sounds: + Systolic murmur most consistent with aortic stenosis  Pulmonary:      Effort: Short of breath     Breath sounds: Diminished with rhonchi  Abdominal:      General: Abdomen is flat. Bowel sounds are normal.      Palpations: Abdomen is soft.   Musculoskeletal:         General: Normal range of motion.   Skin:     General: Skin is warm and dry.      Capillary Refill:  Capillary refill takes less than 2 seconds.   Neurological:      General: No focal deficit present.      Mental Status: Alert, awake and oriented x 3.  Psychiatric:         Mood and Affect: Mood normal.      Assessment/Plan     Norah Cleaning is a 83 y.o. female with past medical history significant for hypertension, chronic diastolic heart failure, COPD on chronic home oxygen, emphysema, new diagnosis of lung cancer, moderate AS ,obstructive sleep apnea, AAA, nonobstructive CAD per cardiac cath 2008 presenting to emergency room complaining of worsening of dyspnea from facility, concerns for pneumonia.  Lab work remarkable for elevated troponin with relatively flat trend at 813, 966, 791, AURA with Cr 1.46( 0.94 on 10/6/23).  Cardiology is consulted for further evaluation of non-ST elevation myocardial infarction.  She was a started on broad-spectrum antibiotics, was given 2 mg of IV fluids, chest x-ray with reported new mass but no clear pneumonia.  She was also started on heparin drip.        I reviewed telemetry.  Patient currently is not on telemetry.  I reviewed chest x-ray, CT chest radiology image interpretation as well as VQ report, echo report, labs, vital signs.     1.  Non-ST elevation myocardial infarction Nil     Elevated troponin does not suggest acute MI pattern, higher degree of elevation however relatively flat trend.  Patient denies any chest pain and EKG negative for any acute ischemic changes however mention of three-vessel coronary artery disease per CT chest from earlier in October.  Given diagnosis of lung cancer,  VTE exclusion D-dimer checked, elevated at 17 00, VQ inderminate, Cr back to normal , CT PE negative however with bilat pleural effusions           2.  Aortic stenosis  Previously reported as moderate per echocardiogram back in March 2022 now severe per echo this admission.  For eval as out pt, consideration of TAVR however pending recovery, not a candidate at present with acute  illness and recent diagnosis of Lung CA.        3.  Acute on Chronic Hypoxic respiratory Failure with con comitant HF given elevated BNP as well as pleural effusions on CT    Suspect secondary to pneumonia given reported fever as well as productive cough with greenish-yellowish sputum, + severe aORTIC STENOSIS in addition to lung CA    Echo with normal EF and RV overload likely given her long standing lung disease    Start IV lasix 40 mg BID  Daily weight and strict I/O. Keep potassium >4, Magnesium >2          She previously followed with Dr. Holbrook for cardiology care however last visit was in 2022.              Code Status:  DNR                 Samara Chaudhary, APRN-CNP                                           Code Status:  DNR            Samara Chaudhary, APRN-CNP

## 2023-10-17 NOTE — PROGRESS NOTES
Norah Cleaning is a 83 y.o. female on day 3 of admission presenting with Acute pneumonia.    Assessment/Plan   83 yoF who returns with SOB. 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 She is on 3.5 L of oxygen at home.     Troponin elevated to several hundreds. CXR with questionable infiltrate. VQ scan indeterminate. She is on heparin drip. ID and cardio consulted.     10/17: CT chest w/o PE, so heparin stopped; does show increased size of lung mass with new effusions/GGO. IV lasix started. Antibiotics switched to levofloxacin.     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)    Aortic stenosis, severe    Non-ischemic myocardial injury (non-traumatic)    Pulmonary HTN (CMS/HCC)    Acute on chronic diastolic heart failure (CMS/HCC)  -  ddx of lung findings: HF, pna, and/or cancer; if doesn't improve with lasix/levofloxacin, will presume mainly cancer that is driving findings  - does not appear like type 1 MI  - discussed case with onc at [facility] [place]; will get CT abd and MRI brain; most likely wouldn't be candidate for any in-house treatments; would need onc appt  - GB ultrasound  - lovenox ppx         Subjective   Spoke with dtr at bedside; feels mom is a bit confused/forgetful/impulsive. Discussed CT chest findings; Norah Dodge would like to push for any cancer treatment she may be candidate for.        Objective     Physical Exam  Cardiovascular:      Rate and Rhythm: Normal rate and regular rhythm.      Heart sounds: Murmur heard.   Pulmonary:      Breath sounds: Rales present.   Abdominal:      General: Bowel sounds are normal.      Palpations: Abdomen is soft.   Musculoskeletal:         General: Normal range of motion.   Neurological:      General: No focal deficit  "present.      Mental Status: She is alert and nearly oriented to person, place, and time.   Psychiatric:         Mood and Affect: Mood normal.     Last Recorded Vitals  Blood pressure 145/68, pulse 63, temperature 36.2 °C (97.2 °F), temperature source Temporal, resp. rate 19, height 1.6 m (5' 2.99\"), weight 48.2 kg (106 lb 4.8 oz), SpO2 93 %.  Intake/Output last 3 Shifts:  I/O last 3 completed shifts:  In: 730.8 (15.2 mL/kg) [I.V.:730.8 (15.2 mL/kg)]  Out: 302 (6.3 mL/kg) [Urine:302 (0.2 mL/kg/hr)]  Weight: 48.2 kg     Relevant Results                           I spent 45 minutes in the professional and overall care of this patient.      Jack Ortez MD      "

## 2023-10-17 NOTE — PROGRESS NOTES
Occupational Therapy                 Therapy Communication Note    Patient Name: Norah Cleaning  MRN: [id number]  Today's Date: 10/17/2023     Discipline: Occupational Therapy    Missed Visit Reason: Missed Visit Reason: Patient refused (Pt. edu on OT role and POC, pt. pleasantly declined participation in OT this date. TCC/MD made aware. OT to defer tx at this time.)    Missed Time: Attempt

## 2023-10-17 NOTE — PROGRESS NOTES
Physical Therapy                 Therapy Communication Note    Patient Name: Norah Cleaning  MRN: [id number]  Today's Date: 10/17/2023     Discipline: Physical Therapy    Missed Visit Reason: Missed Visit Reason: Patient refused (History obtained, and role of PT provided. However, pt declining to get OOB today despite max encouragement. Pt reports she is too cold and tired.)    Missed Time: Attempt

## 2023-10-18 PROBLEM — S32.040S COMPRESSION FRACTURE OF L4 LUMBAR VERTEBRA, SEQUELA: Status: ACTIVE | Noted: 2023-01-01

## 2023-10-18 NOTE — PROGRESS NOTES
Norah Cleaning is a 83 y.o. female on day 4 of admission presenting with Acute pneumonia.    Assessment/Plan   83 yoF who returns with SOB. 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 She is on 3.5 L of oxygen at home.     Troponin elevated to several hundreds. CXR with questionable infiltrate. VQ scan indeterminate. She is on heparin drip. ID and cardio consulted.     10/17: CT chest w/o PE, so heparin stopped; does show increased size of lung mass with new effusions/GGO. IV lasix started. Antibiotics switched to levofloxacin.     10/18: Seems a bit better today. Continue current regimen.     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)    Aortic stenosis, severe    Non-ischemic myocardial injury (non-traumatic)    Pulmonary HTN (CMS/HCC)    Acute on chronic diastolic heart failure (CMS/HCC)    Compression fracture of L4 lumbar vertebra, sequela  -  ddx of lung findings: HF, pna, and/or cancer; if doesn't improve with lasix/levofloxacin, will presume mainly cancer that is driving findings  - does not appear like type 1 MI  - [place] [facility] [place] abd and MRI brain; most likely wouldn't be candidate for any in-house treatments; would need onc appt; follow up on path stains  - lovenox ppx  - DNR         Subjective   Seen sitting in the chair. Worked with PT today. Feels a bit better overall.        Objective     Physical Exam  Cardiovascular:      Rate and Rhythm: Normal rate and regular rhythm.      Heart sounds: Murmur heard.   Pulmonary:      Breath sounds: Rales present.   Abdominal:      General: Bowel sounds are normal.      Palpations: Abdomen is soft.   Musculoskeletal:         General: Normal range of motion.   Neurological:      General:  "No focal deficit present.      Mental Status: She is alert and nearly oriented to person, place, and time.   Psychiatric:         Mood and Affect: Mood normal.     Last Recorded Vitals  Blood pressure 144/73, pulse 72, temperature 36.4 °C (97.6 °F), temperature source Temporal, resp. rate 18, height 1.6 m (5' 2.99\"), weight 46.5 kg (102 lb 8.2 oz), SpO2 96 %.  Intake/Output last 3 Shifts:  I/O last 3 completed shifts:  In: 880.8 (18.9 mL/kg) [P.O.:710; I.V.:170.8 (3.7 mL/kg)]  Out: 1400 (30.1 mL/kg) [Urine:1400 (0.8 mL/kg/hr)]  Weight: 46.5 kg     Relevant Results                           I spent 30 minutes in the professional and overall care of this patient.      Jack Ortez MD      "

## 2023-10-18 NOTE — PROGRESS NOTES
Physical Therapy    Physical Therapy Evaluation & Treatment    Patient Name: Norah Cleaning  MRN: [id number]  Today's Date: 10/18/2023   Time Calculation  Start Time: 1315  Stop Time: 1340  Time Calculation (min): 25 min    Assessment/Plan   PT Assessment  PT Assessment Results: Decreased strength, Decreased endurance, Decreased mobility, Impaired balance, Decreased safety awareness, Pain  Rehab Prognosis: Good  Barriers to Discharge: new diagnosis of lung cancer, COPD hx.  Evaluation/Treatment Tolerance: Patient limited by fatigue, Patient limited by pain  Medical Staff Made Aware: Yes  Strengths: Ability to acquire knowledge  Barriers to Participation: Comorbidities  End of Session Communication: Bedside nurse  Assessment Comment: Patient was independent with transfers and gait with AD short distances prior to admission. Patient has above limitaitons which have resulted an increased risk for falls and increase assistance needed for transfers and mobility. Pt. may benefit from skilled PT to maximize the potential to progress towards goals and decrease falls risk.  End of Session Patient Position: Alarm on, Up in chair  IP OR SWING BED PT PLAN  Inpatient or Swing Bed: Inpatient  PT Plan  Treatment/Interventions: Bed mobility, Transfer training, Gait training, Balance training, Strengthening  PT Plan: Skilled PT  PT Frequency: 3 times per week  PT Discharge Recommendations: Moderate intensity level of continued care  PT Recommended Transfer Status: Assist x1, Assistive device      Subjective     General Visit Information:  General  Reason for Referral: impaired mobiity, Acute Pneumonia  Past Medical History Relevant to Rehab: Admitted with c/o exertional dyspnea weakness, and productive coughExtensive smoking history, cardiomyopathy, CHF, COPD, Emphysema, new RUL mass, COPD, chronic home O2@3.5 L, Severe Aortic stenosis, POSA, L THR, Depression, Fibromyalgia, GERD, Avascular necrosis, L ankle fracture, pubic fx,  chronic low back pain, Osteopenia  Family/Caregiver Present: Yes  Caregiver Feedback: daughter reported that patient has had numerous falls and that she does not leave her apt. much.  Co-Treatment: OT  Co-Treatment Reason: to maximize participation due to pain, SOB, weakness  Prior to Session Communication: Bedside nurse  Patient Position Received: Bed, 3 rail up  Preferred Learning Style: verbal  General Comment: Patient was supine with IV, O2@ 3 L, and pure wick with undergarment which was wet upon entry. Patient was agreeable to PT session.  Home Living:  Home Living  Type of Home: Long Term Care facility  Bathroom Shower/Tub: Walk-in shower  Bathroom Toilet: Adaptive toilet seating  Bathroom Equipment: Grab bars in shower, Shower chair with back  Prior Level of Function:  Prior Function Per Pt/Caregiver Report  Level of [place]: Independent with ADLs and functional transfers, Needs assistance with homemaking  Receives Help From:  (caregivers at facility when needed)  Ambulatory Assistance: Independent (uses fww inside the apt and wheelchair for longer distances)  Prior Function Comments: Patient had a fall 1 month ago when bending over to  an object. Daughter reported frequent falls  Precautions:  Precautions  Medical Precautions: Cardiac precautions, Oxygen therapy device and L/min  Vital Signs:       Objective   Pain:  Pain Assessment  Pain Score: 7 (patient reported 8/10 post treatment. RN stated pt. recently was given meds)  Pain Location: Hip (pain radiated from back down both legs anteriorly and posteriorly to B feet)  Cognition:  Cognition  Overall Cognitive Status: Within Functional Limits    General Assessments:  General Observation  General Observation: patient is unsteady with standing activity with a slight backward lean upon initial standing, unsteady gait. with fww , increased falls risk             Activity Tolerance  Endurance:  (patient c/o dizziness with supine> sit. Pt fatigued  easily with sitting/standing ~5 minutes for completing hygiene needs, and walking 3 feet to chair. Pt. declined to walk again after resting)            Functional Assessments:  Bed Mobility  Bed Mobility: Yes  Bed Mobility 1  Bed Mobility 1: Supine to sitting  Level of Assistance 1: Close supervision  Bed Mobility Comments 1: head of bed raised ~ 30 degrees    Transfers  Transfer: Yes  Transfer 1  Transfer From 1: Bed to  Transfer to 1: Chair with arms, Tilt table (rocking chair)  Technique 1: Stand to sit, Sit to stand  Transfer Device 1: Walker  Transfer Level of Assistance 1: Minimum assistance  Trials/Comments 1: vc for hand/foot placement and weight shift for safety. Patient only reached back with one hand requiring reminder to reach with both hands for safety    Ambulation/Gait Training  Ambulation/Gait Training Performed: Yes  Ambulation/Gait Training 1  Surface 1: Level tile  Device 1: Rolling walker  Assistance 1: Minimum assistance  Quality of Gait 1: Inconsistent stride length  Comments/Distance (ft) 1: vc for position and posure inside walker with assistx1                Treatments:  Therapeutic Exercise  Therapeutic Exercise Performed: Yes  Therapeutic Exercise Activity 1: 10 reps B ankle pumps, B heel slides, and B laq with vc to go through full range to maximize benefitmaximize benefit    Therapeutic Activity  Therapeutic Activity Performed: Yes  Therapeutic Activity 1: sitting at eob with LE movement and weight shift with CGA/SBA x 2 min and standing balance with UE movement and weight shifting with Min A x 2         Bed Mobility  Bed Mobility: Yes  Bed Mobility 1  Bed Mobility 1: Supine to sitting  Level of Assistance 1: Close supervision  Bed Mobility Comments 1: head of bed raised ~ 30 degrees    Ambulation/Gait Training  Ambulation/Gait Training Performed: Yes  Ambulation/Gait Training 1  Surface 1: Level tile  Device 1: Rolling walker  Assistance 1: Minimum assistance  Quality of Gait 1:  Inconsistent stride length  Comments/Distance (ft) 1: vc for position and posure inside walker with assistx1  Transfers  Transfer: Yes  Transfer 1  Transfer From 1: Bed to  Transfer to 1: Chair with arms, Tilt table (rocking chair)  Technique 1: Stand to sit, Sit to stand  Transfer Device 1: Walker  Transfer Level of Assistance 1: Minimum assistance  Trials/Comments 1: vc for hand/foot placement and weight shift for safety. Patient only reached back with one hand requiring reminder to reach with both hands for safety  Outcome Measures:  [facility] Basic Mobility  Turning from your back to your side while in a flat bed without using bedrails: A little  Moving from lying on your back to sitting on the side of a flat bed without using bedrails: A little  Moving to and from bed to chair (including a wheelchair): A little  Standing up from a chair using your arms (e.g. wheelchair or bedside chair): A little  To walk in hospital room: A lot  Climbing 3-5 steps with railing: A lot  Basic Mobility - Total Score: 16    Encounter Problems       Encounter Problems (Active)       Balance       STG - Maintains dynamic standing balance with upper extremity support with Mod I        Start:  10/18/23    Expected End:  11/02/23       INTERVENTIONS:  1. Practice standing with minimal support.  2. Educate patient about standing tolerance.  3. Educate patient about independence with gait, transfers, and ADL's.  4. Educate patient about use of assistive device.  5. Educate patient about self-directed care.            Mobility       STG - Patient will ambulate >= 60 feet for distances within the apt. using fww . No loss of balance with reaching or turns       Start:  10/18/23    Expected End:  11/02/23            Patient with have increased strength to progress towards goals       Start:  10/18/23    Expected End:  11/02/23                   Education Documentation  Precautions, taught by Renae Sandoval, PT at 10/18/2023  2:49  PM.  Learner: Patient  Readiness: Acceptance  Method: Explanation  Response: Verbalizes Understanding    Home Exercise Program, taught by Renae Sandoval, PT at 10/18/2023  2:49 PM.  Learner: Patient  Readiness: Acceptance  Method: Explanation  Response: Verbalizes Understanding    Mobility Training, taught by Renae Sandoval, PT at 10/18/2023  2:49 PM.  Learner: Patient  Readiness: Acceptance  Method: Explanation  Response: Verbalizes Understanding    Education Comments  No comments found.

## 2023-10-18 NOTE — PROGRESS NOTES
Occupational Therapy    Evaluation/Treatment    Patient Name: Norah Cleaning  MRN: [id number]  : 1940  Today's Date: 10/18/23  Time Calculation  Start Time: 1314  Stop Time: 1340  Time Calculation (min): 26 min       Assessment:  OT Assessment: 84 yo presenting to ED with worsening SOB. Pt with newly diagnosed lung ca with increased size of RUL lung mass and new effusions. Acute on Chronic Hypoxic respiratory Failure with con comitant HF given elevated BNP.  Prognosis: Fair  End of Session Communication: Bedside nurse  End of Session Patient Position: Alarm on, Up in chair  OT Assessment Results: Decreased ADL status, Decreased endurance, Decreased functional mobility, Decreased safe judgment during ADL (decr. activity tolerance)  Prognosis: Fair  Strengths: Ability to acquire knowledge  Barriers to Participation: Comorbidities (decr. motivation for mobility)  Plan:  Treatment Interventions: Endurance training, UE strengthening/ROM, ADL retraining, Functional transfer training, Equipment evaluation/education, Compensatory technique education  OT Frequency: 2 times per week  OT Discharge Recommendations: Moderate intensity level of continued care  Treatment Interventions: Endurance training, UE strengthening/ROM, ADL retraining, Functional transfer training, Equipment evaluation/education, Compensatory technique education    General:   OT Received On: 10/18/23  General  Reason for Referral: 84 yo presented to ED with worsening SOB.  Referred By: Neelima  Past Medical History Relevant to Rehab: PMH: extensive smoking history, cardiomyopathy, CHF, COPD, emphysema, new RUL mass  Family/Caregiver Present: Yes  Caregiver Feedback: daughter reported that patient has had numerous falls and that she does not leave her apt. much.  Co-Treatment: OT, PT  Co-Treatment Reason: to maximize participation due to pain, SOB, weakness,  Prior to Session Communication: Bedside nurse  Patient Position Received: Bed, 3 rail  up  Preferred Learning Style: verbal  General Comment: agreeable to OT and PT evaluations.  Precautions:  Medical Precautions: Oxygen therapy device and L/min  Vital Signs:     Pain:  Pain Assessment  Pain Score: 7  Pain Location:  (low back and hip pain, radiates to bilateral feet)  Pain Descriptors: Radiating, Aching    Objective   Cognition:  Overall Cognitive Status: Within Functional Limits (flat affect)  Safety/Judgement: Exceptions to WFL  Insight: Mild  Impulsive: Moderately  Task Initiation:  (abrupt transfer to standing, slightly retropulsive)     Home Living:  Type of Home: Long Term Care facility  Bathroom Shower/Tub: Walk-in shower  Bathroom Toilet: Adaptive toilet seating  Bathroom Equipment: Grab bars in shower, Shower chair with back  Prior Function:  Level of [place]: Independent with ADLs and functional transfers, Needs assistance with homemaking  Receives Help From:  (caregivers at facility when needed)  Ambulatory Assistance: Independent (uses fww inside the apt and wheelchair for longer distances)  Prior Function Comments: Patient had a fall 1 month ago when bending over to  an object. Daughter reported frequent falls. Pt dtr also reports pt is mostly sedentary, uses bsc for toileting, minimal mobility during day  IADL History:  IADL Comments:  (has caregivers to assist with IADLs)  ADL:  Eating Assistance: Independent  UE Dressing Assistance: Independent  LE Dressing Assistance:  (CGA)  LE Dressing Deficit: Steadying, Requires assistive device for steadying (cga/min ax1 trunk support when pt hiking garments over hips-- pt was slightly retropulsive during standing for LB dressing. Pt was able to thread BLE's through brief whil seated EOB without assist)  Activity Tolerance:  Endurance:  (limited standing and acitivty tolerance, needs encouragement for inc participation.)     Bed Mobility/Transfers: Bed Mobility  Bed Mobility: Yes  Bed Mobility 1  Bed Mobility 1: Supine to  sitting  Level of Assistance 1: Close supervision  Bed Mobility Comments 1:  (demo's good seated and dynamic sitting balance at EOB)    Transfers  Transfer: Yes  Transfer 1  Transfer From 1: Bed to  Transfer to 1: Chair with arms, Tilt table (rocking chair)  Technique 1: Stand to sit, Sit to stand  Transfer Level of Assistance 1: Minimum assistance  Trials/Comments 1: min ax1 for sit to stand and ambulation from EOB to chair-- max v/t cues for ww positioning, hand placement and min assist to negotiate ww during ambualtion. No overt balance loss  Extremities: RUE   RUE : Within Functional Limits (decr. end range shoulder flexion,  grossly at least 3/5 throughout prxomally, 3+/5 distally) and LUE   LUE: Within Functional Limits (decr. end range shoulder flexion, grossly at least 3/5 throughout prxomally, 3+/5 distally)    Outcome Measures:   [facility] Daily Activity  Putting on and taking off regular lower body clothing: A little  Bathing (including washing, rinsing, drying): A lot  Putting on and taking off regular upper body clothing: A little  Toileting, which includes using toilet, bedpan or urinal: A lot  Taking care of personal grooming such as brushing teeth: A little  Eating Meals: None  Daily Activity - Total Score: 17      Education Documentation  Body Mechanics, taught by Sonny Sutton OT at 10/18/2023  3:37 PM.  Learner: Patient  Readiness: Acceptance  Method: Explanation  Response: Verbalizes Understanding    ADL Training, taught by Sonny Sutton OT at 10/18/2023  3:37 PM.  Learner: Patient  Readiness: Acceptance  Method: Explanation  Response: Verbalizes Understanding    Education Comments  No comments found.        OP EDUCATION:  Education  Individual(s) Educated: Patient, Child  Education Provided: Risk and benefits of OT discussed with patient or other (importance of OOB mobility for recovery)  Patient Response to Education: Patient/Caregiver Verbalized Understanding of  Information    Goals:  Encounter Problems       Encounter Problems (Active)       ADLs       Patient will perform UB and LB bathing while seated with modified independent level of assistance and  (Progressing)       Start:  10/18/23    Expected End:  11/01/23            Patient with complete lower body dressing with modified independent level of assistance donning and doffing all LE clothes  with no adaptive equipment while edge of bed  (Progressing)       Start:  10/18/23    Expected End:  11/01/23            Patient will complete toileting including hygiene clothing management/hygiene with modified independent level of assistance and raised toilet seat and grab bars. (Progressing)       Start:  10/18/23    Expected End:  11/01/23               BALANCE       Patientt will maintain static standing balance x10min during ADL task with modified independent level of assistance in order to demonstrate decreased risk of falling and improved postural control. (Progressing)       Start:  10/18/23    Expected End:  11/01/23                 MOBILITY       Patient will perform Functional mobility Household distances/Community Distances with modified independent level of assistance and least restrictive device in order to improve safety and functional mobility, utilizes ECWS and PLB as needed (Progressing)       Start:  10/18/23    Expected End:  11/01/23                 TRANSFERS       Patient will complete functional transfers to/from chair, toilet with least restrictive device with modified independent level of assistance. (Progressing)       Start:  10/18/23    Expected End:  11/01/23

## 2023-10-18 NOTE — CARE PLAN
The patient's goals for the shift include pain control    The clinical goals for the shift include to remain safe, free from pain and comfortable

## 2023-10-19 PROBLEM — I50.31: Status: ACTIVE | Noted: 2023-01-01

## 2023-10-19 NOTE — NURSING NOTE
Pt received 1,340 ml of Golytely throughout the shift and still did not have a BM. Pt was not in any pain during the shift. Sitter has been at bedside.

## 2023-10-19 NOTE — PROGRESS NOTES
Norah Cleaning is a 83 y.o. female on day 5 of admission presenting with Acute pneumonia.    Assessment/Plan   83 yoF who returns with SOB. 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 She is on 3.5 L of oxygen at home.     Troponin elevated to several hundreds. CXR with questionable infiltrate. VQ scan indeterminate. She is on heparin drip. ID and cardio consulted.     10/17: CT chest w/o PE, so heparin stopped; does show increased size of lung mass with new effusions/GGO. IV lasix started. Antibiotics switched to levofloxacin.   10/18: Seems a bit better today. Continue current regimen.     10/19: She says her breathing feels a bit worse today. Will consult pulm today. Continue lasix and levofloxacin. Of note, PM lasix has been held the past 2 nights due to low BP.     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)    Aortic stenosis, severe    Non-ischemic myocardial injury (non-traumatic)    Pulmonary HTN (CMS/HCC)    Acute diastolic HF (heart failure) (CMS/HCC)    Compression fracture of L4 lumbar vertebra, sequela  -  ddx of lung findings: HF, pna, and/or cancer; if doesn't improve with lasix/levofloxacin, will presume mainly cancer that is driving findings, but will give her more time  - add pulm c/s  - does not appear like type 1 MI  - discussed case with onc at [facility]; will get CT abd and MRI brain; most likely wouldn't be candidate for any in-house treatments; would need onc appt (ordered); follow up on path stains  - lovenox ppx  - DNR             Subjective   Breathing feels worse today.       Objective   Physical Exam  Cardiovascular:      Rate and Rhythm: Normal rate and regular rhythm.      Heart sounds: Murmur heard.   Pulmonary:      Breath sounds: Rales  "present.   Abdominal:      General: Bowel sounds are normal.      Palpations: Abdomen is soft.   Musculoskeletal:         General: Normal range of motion.   Neurological:      General: No focal deficit present.      Mental Status: She is alert and nearly oriented to person, place, and time.   Psychiatric:         Mood and Affect: Mood normal.     Last Recorded Vitals  Blood pressure 127/83, pulse 89, temperature 36.8 °C (98.2 °F), resp. rate 20, height 1.6 m (5' 2.99\"), weight 50.1 kg (110 lb 7.2 oz), SpO2 98 %.  Intake/Output last 3 Shifts:  I/O last 3 completed shifts:  In: 1245 (24.9 mL/kg) [P.O.:1195; I.V.:50 (1 mL/kg)]  Out: 1100 (22 mL/kg) [Urine:1100 (0.6 mL/kg/hr)]  Weight: 50.1 kg                  I spent 40 minutes in the professional and overall care of this patient.      Jack Ortez MD      "

## 2023-10-19 NOTE — CARE PLAN
Dr Levin spoke with [facility] ONC- MRI now pending, patient from [facility], will return there at [place]. ADOD 1-3 days based on MRI findings.

## 2023-10-19 NOTE — PROGRESS NOTES
Subjective Data:  No new complaints. Still with productive cough and chest congestion.     Objective Data:  Last Recorded Vitals:  Vitals:    10/19/23 0300 10/19/23 0625 10/19/23 0750 10/19/23 0808   BP: 142/66   131/68   BP Location: Left arm      Patient Position: Lying      Pulse: 74   88   Resp: 18   20   Temp: 37.1 °C (98.8 °F)   36.8 °C (98.3 °F)   TempSrc: Oral      SpO2: 98%  97% 96%   Weight:  50.1 kg (110 lb 7.2 oz)     Height:           Last Labs:  CBC - 10/17/2023:  6:07 AM  11.3 8.9 333    30.2      CMP - 10/19/2023:  6:41 AM  9.0 5.8 12 --- 0.5   _ 3.0 4 40      PTT - No results in last year.  1.1   12.9 _     TROPHS   Date/Time Value Ref Range Status   10/14/2023 06:56  0 - 13 ng/L Final     Comment:     Previous result verified on 10/13/2023 2019 on specimen/case [id number] called with component TRPHS for procedure Troponin I, High Sensitivity with value 813 ng/L.   10/14/2023 02:41  0 - 13 ng/L Final     Comment:     Previous result verified on 10/13/2023 2019 on specimen/case [id number] called with component TRPHS for procedure Troponin I, High Sensitivity with value 813 ng/L.   10/14/2023 12:23  0 - 13 ng/L Final     Comment:     Previous result verified on 10/13/2023 2019 on specimen/case [id number] called with component TRPHS for procedure Troponin I, High Sensitivity with value 813 ng/L.     BNP   Date/Time Value Ref Range Status   10/14/2023 06:53  0 - 99 pg/mL Final   10/01/2023 01:00  0 - 99 pg/mL Final     HGBA1C   Date/Time Value Ref Range Status   03/25/2022 04:59 AM 5.4 % Final     Comment:          Diagnosis of Diabetes-Adults   Non-Diabetic: < or = 5.6%   Increased risk for developing diabetes: 5.7-6.4%   Diagnostic of diabetes: > or = 6.5%  .       Monitoring of Diabetes                Age (y)     Therapeutic Goal (%)   Adults:          >18           <7.0   Pediatrics:    13-18           <7.5                   7-12           <8.0                    0- 6            7.5-8.5   American Diabetes Association. Diabetes Care 33(S1), Jan 2010.     03/07/2021 05:41 AM 7.2 % Final     Comment:          Diagnosis of Diabetes-Adults   Non-Diabetic: < or = 5.6%   Increased risk for developing diabetes: 5.7-6.4%   Diagnostic of diabetes: > or = 6.5%  .       Monitoring of Diabetes                Age (y)     Therapeutic Goal (%)   Adults:          >18           <7.0   Pediatrics:    13-18           <7.5                   7-12           <8.0                   0- 6            7.5-8.5   American Diabetes Association. Diabetes Care 33(S1), Jan 2010.       VLDL   Date/Time Value Ref Range Status   03/24/2022 06:01 AM 22 0 - 40 mg/dL Final   11/18/2020 10:34 AM 28 0 - 40 mg/dL Final   09/20/2019 09:53 AM 25 0 - 40 mg/dL Final      Last I/O:  I/O last 3 completed shifts:  In: 1245 (24.9 mL/kg) [P.O.:1195; I.V.:50 (1 mL/kg)]  Out: 1100 (22 mL/kg) [Urine:1100 (0.6 mL/kg/hr)]  Weight: 50.1 kg     .      Inpatient Medications:  Scheduled medications   Medication Dose Route Frequency    acetaminophen  650 mg oral TID    albuterol  2.5 mg nebulization TID    aspirin  81 mg oral Daily    atorvastatin  40 mg oral Nightly    budesonide  0.5 mg nebulization BID    cholecalciferol  50 mcg oral BID    DULoxetine  30 mg oral Daily    enoxaparin  40 mg subcutaneous Daily    [Held by provider] ferrous sulfate  65 mg of iron oral Daily    formoterol  20 mcg nebulization BID    furosemide  40 mg intravenous q12h    gabapentin  200 mg oral TID    guaiFENesin  600 mg oral BID    ketotifen  1 drop Both Eyes BID    lactobacillus acidophilus  1 capsule oral Daily    levoFLOXacin  750 mg intravenous q48h    [Held by provider] losartan  50 mg oral Daily    melatonin  4.5 mg oral Nightly    metoprolol succinate XL  12.5 mg oral Daily    montelukast  10 mg oral Daily    pantoprazole  40 mg oral Daily before breakfast    polyethylene glycol  17 g oral BID    sennosides  1 tablet oral BID    sildenafil   20 mg oral TID     PRN medications   Medication    acetaminophen    acetaminophen    acetaminophen    albuterol    benzonatate    bisacodyl    guaiFENesin    ondansetron    Or    ondansetron    oxyCODONE-acetaminophen    oxygen     Continuous Medications   Medication Dose Last Rate    [Held by provider] sodium chloride 0.9%  125 mL/hr 125 mL/hr (10/18/23 0032)       Physical Exam:  Gen Well appearing elderly female sitting up in NAD. Body mass index is 19.57 kg/m².   CV rrr. 3/6 ALEJANDRA. + JVD . No leg edema. Pulses 2+ and symmetric.    Pulm Reduced at the bases. Lungs otherwise clear with normal respiratory effort.   Neuro Alert and conversant. Grossly nonfocal.          Assessment  Acute diastolic HF  With concomitant PNA. Volume+ with small to moderate pleural effusions.    2. Aortic stenosis  Severe. Not a TAVR candidate.     3. Hypertension   BP variable but overall acceptable under present circumstances.    4. Elevated troponin  Non-MI troponin elevation / acute non-traumatic myocardial injury. Core measures do not apply.     Recommendations   Con't IV diuretics. Strict I/O's. Daily weights. Monitor Mag/K and supplement to >2/4. Close monitoring of renal function. Anticipate transition to oral 10/21.          Steve Holbrook MD
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
## 2023-10-20 NOTE — PROGRESS NOTES
Norah Cleaning is a 83 y.o. female on day 6 of admission presenting with Acute pneumonia.      Subjective   Patient feeling well.  No shortness of breath.   starting authorization for skilled facility.  Normally uses 3 L nasal cannula.  Awaiting MRI brain       Objective     Last Recorded Vitals  /58   Pulse 73   Temp 36.6 °C (97.9 °F)   Resp 18   Wt 50.1 kg (110 lb 7.2 oz)   SpO2 100%   Intake/Output last 3 Shifts:  No intake or output data in the 24 hours ending 10/20/23 1159    Admission Weight  Weight: 46.7 kg (103 lb) (10/13/23 1918)    Daily Weight  10/19/23 : 50.1 kg (110 lb 7.2 oz)      Physical Exam:  General: Not in acute distress, alert.  On 3 L nasal cannula  HEENT: PERRLA, head intact and normocephalic  Neck: Normal to inspection  Lungs: Clear to auscultation, work of breathing within normal limit  Cardiac: Regular rate and rhythm  Abdomen: Soft nontender, positive bowel sounds  : Exam deferred  Skin: Intact  Hematology: No petechia or excessive ecchymosis  Musculoskeletal: Without significant trauma  Neurological: Alert awake oriented, no focal deficit, cranial nerves grossly intact  Psych: No suicidal ideation or homicidal ideation    Relevant Results  Scheduled medications  acetaminophen, 650 mg, oral, TID  albuterol, 2.5 mg, nebulization, TID  aspirin, 81 mg, oral, Daily  atorvastatin, 40 mg, oral, Nightly  budesonide, 0.5 mg, nebulization, BID  cholecalciferol, 50 mcg, oral, BID  DULoxetine, 30 mg, oral, Daily  enoxaparin, 40 mg, subcutaneous, Daily  [Held by provider] ferrous sulfate, 65 mg of iron, oral, Daily  formoterol, 20 mcg, nebulization, BID  furosemide, 40 mg, intravenous, BID  gabapentin, 200 mg, oral, TID  guaiFENesin, 600 mg, oral, BID  ketotifen, 1 drop, Both Eyes, BID  lactobacillus acidophilus, 1 capsule, oral, Daily  levoFLOXacin, 750 mg, intravenous, q48h  [Held by provider] losartan, 50 mg, oral, Daily  melatonin, 4.5 mg, oral, Nightly  metoprolol  succinate XL, 12.5 mg, oral, Daily  montelukast, 10 mg, oral, Daily  pantoprazole, 40 mg, oral, Daily before breakfast  polyethylene glycol, 17 g, oral, BID  sennosides, 1 tablet, oral, BID  sildenafil, 20 mg, oral, TID      Continuous medications  [Held by provider] sodium chloride 0.9%, 125 mL/hr, Last Rate: 125 mL/hr (10/18/23 0032)      PRN medications  PRN medications: acetaminophen, acetaminophen, acetaminophen, albuterol, benzonatate, bisacodyl, guaiFENesin, ondansetron **OR** ondansetron, oxyCODONE-acetaminophen, oxygen   Results for orders placed or performed during the hospital encounter of 10/13/23 (from the past 24 hour(s))   Basic Metabolic Panel   Result Value Ref Range    Glucose 69 (L) 74 - 99 mg/dL    Sodium 139 136 - 145 mmol/L    Potassium 3.5 3.5 - 5.3 mmol/L    Chloride 96 (L) 98 - 107 mmol/L    Bicarbonate 38 (H) 21 - 32 mmol/L    Anion Gap 9 (L) 10 - 20 mmol/L    Urea Nitrogen 8 6 - 23 mg/dL    Creatinine 1.00 0.50 - 1.05 mg/dL    eGFR 56 (L) >60 mL/min/1.73m*2    Calcium 8.5 (L) 8.6 - 10.3 mg/dL   Magnesium   Result Value Ref Range    Magnesium 1.60 1.60 - 2.40 mg/dL   Lavender Top   Result Value Ref Range    Extra Tube Hold for add-ons.         XR chest 1 view    Result Date: 10/19/2023  Interpreted By:  Luci Taylor, STUDY: XR CHEST 1 VIEW;  10/19/2023 10:09 am   INDICATION: Signs/Symptoms:COUGH, SOB.   COMPARISON: 10/15/2023   ACCESSION NUMBER(S): [id number]   ORDERING CLINICIAN: NIA MIGUEL   FINDINGS: Coarsened interstitial lung markings are again seen. Elevation of the right hemidiaphragm is again noted. Surgical clips are noted in the neck.   Heart is mildly enlarged but is unchanged. Round density seen in the right upper lobe in the paratracheal region, which most likely is related to patient's known mass that was seen on the CT of 10/16/2023       Mild cardiomegaly.   Chronic lung changes.   Round density in the right upper lobe which corresponds to a mass was seen on the recent  CT scan.       MACRO: None   Signed by: Luci Taylor 10/19/2023 10:23 AM Dictation workstation:   [id number]    CT abdomen pelvis w IV contrast    Result Date: 10/18/2023  Interpreted By:  Etelvina Brown, STUDY: CT ABDOMEN PELVIS W IV CONTRAST;  10/17/2023 6:06 pm   INDICATION: Signs/Symptoms:staging for cancer.   COMPARISON: 01/02/2021 and CT chest 10/16/2023   ACCESSION NUMBER(S): [id number]   ORDERING CLINICIAN: NIA MIGUEL   TECHNIQUE: CT of the abdomen and pelvis was performed. Sagittal and coronal reconstructions were generated.    75 cc Omnipaque 350 intravenous contrast given for the exam.   FINDINGS:     ABDOMINAL ORGANS:   LIVER: Prominent left lobe. No focal mass lesion.   GALL BLADDER AND BILIARY TREE: No calcified gallstone or gallbladder wall thickening. Slight fullness of the intrahepatic biliary tree versus periportal edema.   SPLEEN: No definite focal lesion. Small isodense presumed splenule adjacent to the medial inferior pole.   PANCREAS: Atrophic. No definite mass lesion.   ADRENALS: No adrenal mass   KIDNEYS AND URETERS: Multiple rounded hypodense lesions arising from both kidneys. 1 of the largest in the mid left kidney measures 4.7 x 3.0 cm is similar in size to the prior exam and likely has a punctate mural calcification which is unchanged. No hydronephrosis.   BOWEL: Small hiatal hernia. Stomach is distended with fluid and heterogenous debris. Decompressed small bowel loops located in the right mid abdomen. The cecum is located in the posterior pelvis. Moderate colonic fecal residue. Dense likely retained contrast material within probable diverticula along the sigmoid colon. No definite associated inflammatory changes within limits of artifact from bilateral hip arthroplasties.   PERITONEUM, RETROPERITONEUM, NODES: The cul-de-sac is obscured. Small amount of free fluid. No definite free intraperitoneal air. No obvious retroperitoneal or mesenteric lymphadenopathy. Again soft tissue  density nodularity in the right mid abdomen believed related to decompressed small bowel loops.   VESSELS:  Extensive atherosclerotic calcifications. Aorto biiliac graft. Probable small enhancing retroperitoneal collateral vessels.   PELVIS: Artifact from bilateral hip arthroplasties limits evaluation. The urinary bladder is moderately distended though largely obscured. The uterus, if present, is obscured   ABDOMINAL WALL: Paucity of subcutaneous fat. No sizable abdominal wall hernia. Diffuse soft tissue edema and asymmetric probable dependent edema in the right flank.   BONES: Bilateral hip arthroplasties. Osteopenia. Severe compression deformity at L4 with retropulsion and sclerosis, new from prior. Similar but less prominent findings at T10. Mild superior endplate compression deformities at T12 and L1 similar to the prior exam.   LOWER CHEST: Elevated right diaphragm. Emphysematous changes. Patchy infiltrates in both lung bases, right-greater-than-left, and moderate bilateral pleural effusions. Coronary artery calcifications and calcifications in the region of the mitral valve.       No definite evidence of abdominal visceral metastatic disease or lymphadenopathy. Limited assessment of the pelvis due to artifact from bilateral hip arthroplasties.   Small amount of nonspecific peritoneal free fluid.   Unusual clustering of decompressed small-bowel loops in the right mid abdomen which could reflect element of malrotation or internal hernia.   Multiple hypodense lesions arising from both kidneys, likely cysts although not fully characterized on single phase exam.   Severe indeterminate age L4 compression deformity with retropulsion and sclerosis and moderate compression deformity at T10.   Emphysematous changes, basilar infiltrates and pleural effusions in the visualized lower chest.   Other findings as described above.   MACRO: None.   Signed by: Etelvina Brown 10/18/2023 9:07 AM Dictation workstation:    [id number]    US gallbladder    Result Date: 10/17/2023  Interpreted By:  Etelvina Brown, STUDY: US GALLBLADDER;  10/17/2023 9:02 am   INDICATION: Signs/Symptoms:gallbladder thickening.   COMPARISON: None.   ACCESSION NUMBER(S): [id number]   ORDERING CLINICIAN: NIA MIGUEL   TECHNIQUE: Multiple images of the right upper quadrant were obtained.   FINDINGS: LIVER:  Normal size and echogenicity. No focal lesion demonstrated.   GALLBLADDER:   Borderline dilated, measuring 5.0 cm transversely. Partially folded upon itself. No gallstone, wall thickening or overlying tenderness.   BILIARY TREE: The common duct measures  7 mm.   PANCREAS:   Partially obscured by overlying bowel gas.  Visualized portions within normal limits.   RIGHT KIDNEY:   Normal in size. No hydronephrosis. 1.6 cm partially septated cyst at the upper pole. 3.4 cm cyst containing a partially thickened septation in the inferior pole.   Small amount of free fluid in the right upper quadrant. Also small bilateral pleural effusions incidentally noted.       Borderline dilated, otherwise unremarkable appearing gallbladder.   Small amount of free fluid in the right upper quadrant and small bilateral pleural effusions.   2 complex cysts in the right kidney. Consider follow-up to assess stability or further evaluation with multiphase renal CT or MRI as clinically warranted.   MACRO: None.   Signed by: Etelvina Brown 10/17/2023 2:49 PM Dictation workstation:   [id number]    CT angio chest for pulmonary embolism    Result Date: 10/16/2023  Interpreted By:  Eliana York, STUDY: CT ANGIO CHEST FOR PULMONARY EMBOLISM;  10/16/2023 2:11 pm   INDICATION: Shortness of breath, concern for pulmonary embolism.   COMPARISON: CT chest 10/02/2023   ACCESSION NUMBER(S): [id number]   ORDERING CLINICIAN: OTILIO FARNSWORTH   TECHNIQUE: Helical data acquisition of the chest was obtained following intravenous administration of 75 ml of Omnipaque 350. Images were reformatted  in coronal and sagittal planes. Axial and coronal MIP images were created and reviewed.   FINDINGS: POTENTIAL LIMITATIONS OF THE STUDY: Right lower lobe consolidation.   HEART AND VESSELS: No discrete filling defects within the main pulmonary artery or its branches within limits of suboptimal right middle/lower lobe distal segmental and subsegmental pulmonary artery evaluation due to above limitation.   Main pulmonary artery and its branches are normal in caliber.   The thoracic aorta is of normal course and caliber with severe vascular calcifications.   Severe 4-vessel coronary artery calcifications are seen.The study is not optimized for evaluation of coronary arteries.   Biatrial enlargement.   No evidence of pericardial effusion.   MEDIASTINUM AND KATIE, LOWER NECK AND AXILLA: The visualized thyroid gland is within normal limits.   Stable right upper paratracheal node, 2.5 x 1.1 cm (series 607, image 83).   Esophagus appears within normal limits as seen.   LUNGS AND AIRWAYS: Progressive narrowing right middle lobe and right lower lobe central airways. New medial left upper lobe and lingular peribronchial wall thickening.   New small right and moderate left pleural effusions.   Background moderate to severe centrilobular emphysema. Increased size of right upper lobe mass, 3.6 x 2.5 cm, previously 2.4 x 2.4 cm. Now without central cavitation. Increased adjacent airspace opacities.   Increased right lower lobe and new left lower lobe airspace opacities. No pneumothorax.   UPPER ABDOMEN: New, gallbladder dilation, partially imaged.   CHEST WALL AND OSSEOUS STRUCTURES: Multiple bilateral breast calcifications redemonstrated. Remote right clavicular head fracture. Unchanged multilevel compression fractures, severe at T7. Small bone island left inferior scapula. No suspicious osseous lesions.       No acute pulmonary embolism within the limitation of suboptimal right middle/lower lobe distal segmental and subsegmental  pulmonary artery evaluation due to parenchymal consolidation.   Increased size right upper lobe mass with persistent mediastinal lymphadenopathy. Rapid interval growth suggests infectious/inflammatory etiology, although neoplastic process can not be excluded. Consider further evaluation with PET-CT.   New bilateral pleural effusions with worsening bilateral lower lobe airspace disease.   New gallbladder wall dilation. Suggest further evaluation with right upper quadrant ultrasound.   MACRO None   Signed by: Eliana York 10/16/2023 3:31 PM Dictation workstation:   [id number]    XR chest 1 view    Result Date: 10/16/2023  Interpreted By:  Eliana York, STUDY: XR CHEST 1 VIEW; 10/15/2023 7:39 pm   INDICATION: V/Q scan   COMPARISON: Radiographs 10/13/2023, CT chest 10/02/2023   ACCESSION NUMBER(S): [id number]   ORDERING CLINICIAN: ИРИНА LIM   TECHNIQUE: Single frontal view of the chest performed.   FINDINGS: LINES AND DEVICES: None.   LUNGS: Emphysema. Previously shown right upper lobe paraspinal cavitary lesion is obscured by mediastinal silhouette and better assessed on the prior CT. Persistent elevation of right hemidiaphragm. Bibasilar atelectasis. Stable bibasilar pleural thickening. No new focal consolidation, pleural effusion or pneumothorax.   CARDIOMEDIASTINAL SILHOUETTE: The cardiomediastinal silhouette is within normal limits accounting for patient rotation.       No focal consolidation.   Right upper lobe paraspinal cavitary lesion is obscured and better assessed on the prior CT.   MACRO None   Signed by: Eliana York 10/16/2023 8:42 AM Dictation workstation:   [id number]    NM lung perfusion particulate    Result Date: 10/15/2023  Interpreted By:  Ryan Lopez, STUDY: NM LUNG PERFUSION PARTICULATE;  10/15/2023 7:41 pm   INDICATION: Signs/Symptoms:elevated VTE exclusion D dimer, hypoxia.   COMPARISON: Chest x-ray from 10/15/2023, CT 10/2/2023   ACCESSION NUMBER(S): [id number]    ORDERING CLINICIAN: OTILIO FARNSWORTH   TECHNIQUE: Multiple perfusion images of the lungs were acquired after the intravenous administration of 4 mCi of Tc-99m macroaggregated albumin (MAA). In addition, SPECT/CT of the chest was performed.   FINDINGS: There is heterogeneous perfusion of the lungs. There is area of decreased perfusion/perfusion defect in the left upper and midlung. There is small pleural effusion and consolidative opacity in the left lower lobe. There is also small right pleural effusion and elevation the right diaphragm corresponding to perfusion defect in the right lower lung. The patient has known cavitary mass in the right upper lobe is better assessed on 10/2/2023 CT chest examination.       Heterogeneous perfusion of the lungs with area of decreased perfusion in the lateral left upper and mid lung which however does not meet the criterion for high probability, and if there is persistent concern for acute pulmonary embolism, a CT PE chest is recommended for further evaluation. Small pleural effusions and consolidative opacities with consolidation in the left lower lobe compatible with pneumonia. Right pleural effusion and elevation of the right diaphragm corresponds to perfusion defect in the right lower lung. The patient's known cavitary mass in the right upper lobe is better assessed on 10/2/2023 CT chest examination.             Signed by: Ryan Lopez 10/15/2023 7:55 PM Dictation workstation:   [id number]    [facility] local SPECT    Result Date: 10/15/2023  Interpreted By:  Ryan Lopez, STUDY: NM LUNG PERFUSION PARTICULATE;  10/15/2023 7:41 pm   INDICATION: Signs/Symptoms:elevated VTE exclusion D dimer, hypoxia.   COMPARISON: Chest x-ray from 10/15/2023, CT 10/2/2023   ACCESSION NUMBER(S): [id number]   ORDERING CLINICIAN: OTILIO FARNSWORTH   TECHNIQUE: Multiple perfusion images of the lungs were acquired after the intravenous administration of 4 mCi of Tc-99m macroaggregated albumin (MAA).  In addition, SPECT/CT of the chest was performed.   FINDINGS: There is heterogeneous perfusion of the lungs. There is area of decreased perfusion/perfusion defect in the left upper and midlung. There is small pleural effusion and consolidative opacity in the left lower lobe. There is also small right pleural effusion and elevation the right diaphragm corresponding to perfusion defect in the right lower lung. The patient has known cavitary mass in the right upper lobe is better assessed on 10/2/2023 CT chest examination.       Heterogeneous perfusion of the lungs with area of decreased perfusion in the lateral left upper and mid lung which however does not meet the criterion for high probability, and if there is persistent concern for acute pulmonary embolism, a CT PE chest is recommended for further evaluation. Small pleural effusions and consolidative opacities with consolidation in the left lower lobe compatible with pneumonia. Right pleural effusion and elevation of the right diaphragm corresponds to perfusion defect in the right lower lung. The patient's known cavitary mass in the right upper lobe is better assessed on 10/2/2023 CT chest examination.             Signed by: Ryan Lopez 10/15/2023 7:55 PM Dictation workstation:   [id number]    Transthoracic Echo (TTE) Complete    Result Date: 10/14/2023   [facility], [address] [place] [address]              Tel [phone] and Fax [phone] TRANSTHORACIC ECHOCARDIOGRAM REPORT  Patient Name:     IONA RODRIGUEZ Reading           [id number] [address]                                       Physician:        MD Study Date:       10/14/2023          Ordering          [id number] OTILIO FARNSWORTH                                       Provider: MRN/PID:          [id number]            Fellow: Accession#:       [id number]        Nurse: Date of           1940 / 83      Sonographer:      Alivia Bentley RDCS Birth/Age:        years Gender:            F                   Additional Staff: Height:           157.48 cm           Admit Date:       10/13/2023 Weight:           50.00 kg            Admission Status: Inpatient - Routine BSA:              1.48 m2             Encounter#:       [id number] Blood Pressure: 92 /57 mmHg Study Type:    TRANSTHORACIC ECHO (TTE) COMPLETE Diagnosis/ICD: Non ST elevation (NSTEMI) myocardial infarction-I21.4 Indication:    NSTEMI CPT Code:      Echo Complete w Full Doppler-[id number] Patient History: Pertinent History: COPD, LE Edema and Hx COVID 2021. Study Detail: The following Echo studies were performed: 2D, M-Mode, Doppler and               color flow. Technically challenging study due to body habitus.               Unable to obtain suprasternal notch view.  PHYSICIAN INTERPRETATION: Left Ventricle: The left ventricular systolic function is normal, with an estimated ejection fraction of 60-65%. There are no regional wall motion abnormalities. The left ventricular cavity size is normal. There is moderate concentric left ventricular hypertrophy. Spectral Doppler shows a normal pattern of left ventricular diastolic filling. Left Atrium: The left atrium is normal in size. Right Ventricle: The right ventricle is normal in size. There is normal right ventricular global systolic function. Right Atrium: The right atrium is upper limits of normal in size. Aortic Valve: The aortic valve appears abnormal. There is evidence of severe aortic valve stenosis. There is no evidence of aortic valve regurgitation. The peak instantaneous gradient of the aortic valve is 65.1 mmHg. The mean gradient of the aortic valve is 41.0 mmHg. Aortic valve: Peak gradient 65.1 mmHg, mean gradient 41 mmHg, DHAVAL 0.7cm2. Mitral Valve: The mitral valve is mildly thickened. There is mild mitral annular calcification. There is no evidence of mitral valve regurgitation. Tricuspid Valve: The tricuspid valve is structurally normal. There is trace tricuspid  regurgitation. The Doppler estimated RVSP is moderate to severely elevated at 50.2 mmHg. Pulmonic Valve: The pulmonic valve is not well visualized. There is physiologic pulmonic valve regurgitation. Pericardium: There is no pericardial effusion noted. Aorta: The aortic root is normal. In comparison to the previous echocardiogram(s): Compared with study from 3/23/2022,.  CONCLUSIONS:  1. Left ventricular systolic function is normal with a 60-65% estimated ejection fraction.  2. There is moderate concentric left ventricular hypertrophy.  3. Moderate to severely elevated right ventricular systolic pressure.  4. Aortic valve appears abnormal.  5. Aortic valve: Peak gradient 65.1 mmHg, mean gradient 41 mmHg, DHAVAL 0.7cm2.  6. Severe aortic valve stenosis. QUANTITATIVE DATA SUMMARY: 2D MEASUREMENTS:                           Normal Ranges: LAs:           3.70 cm    (2.7-4.0cm) IVSd:          1.65 cm    (0.6-1.1cm) LVPWd:         1.59 cm    (0.6-1.1cm) LVIDd:         3.81 cm    (3.9-5.9cm) LVIDs:         2.78 cm LV Mass Index: 166.1 g/m2 LV % FS        27.2 % LA VOLUME:                               Normal Ranges: LA Vol A4C:        24.2 ml    (22+/-6mL/m2) LA Vol A2C:        96.6 ml LA Vol Index A4C:  16.3ml/m2 LA Vol Index A2C:  65.1 ml/m2 LA Area A4C:       11.7 cm2 LA Major Axis A4C: 4.8 cm LA Vol A4C:        22.5 ml LA Vol A2C:        91.9 ml M-MODE MEASUREMENTS:              Normal Ranges: LAs: 4.79 cm (2.7-4.0cm) AORTA MEASUREMENTS:                      Normal Ranges: Ao Sinus, d: 2.70 cm (2.1-3.5cm) Ao STJ, d:   2.80 cm (1.7-3.4cm) Asc Ao, d:   2.90 cm (2.1-3.4cm) LV SYSTOLIC FUNCTION BY 2D PLANIMETRY (MOD):                     Normal Ranges: EF-A4C View: 73.6 % (>=55%) EF-A2C View: 60.0 % EF-Biplane:  67.5 % LV DIASTOLIC FUNCTION:                               Normal Ranges: MV Peak E:        1.10 m/s    (0.7-1.2 m/s) MV Peak A:        1.00 m/s    (0.42-0.7 m/s) E/A Ratio:        1.10        (1.0-2.2) MV lateral e'      0.09 m/s MV medial e'      0.04 m/s MV A Dur:         103.81 msec PulmV Sys Neftali:    27.15 cm/s PulmV Walker Neftali:   34.42 cm/s PulmV S/D Neftali:    0.79 PulmV A Revs Neftali: 24.50 cm/s PulmV A Revs Dur: 129.18 msec MITRAL VALVE:                      Normal Ranges: MV Vmax:    1.18 m/s (<=1.3m/s) MV peak P.6 mmHg (<5mmHg) MV mean P.5 mmHg (<2mmHg) MV VTI:     31.66 cm (10-13cm) MV DT:      219 msec (150-240msec) AORTIC VALVE:                                    Normal Ranges: AoV Vmax:                4.03 m/s  (<=1.7m/s) AoV Peak P.1 mmHg (<20mmHg) AoV Mean P.0 mmHg (1.7-11.5mmHg) LVOT Max Neftali:            0.89 m/s  (<=1.1m/s) AoV VTI:                 96.67 cm  (18-25cm) LVOT VTI:                20.17 cm LVOT Diameter:           1.93 cm   (1.8-2.4cm) AoV Area, VTI:           0.61 cm2  (2.5-5.5cm2) AoV Area,Vmax:           0.65 cm2  (2.5-4.5cm2) AoV Dimensionless Index: 0.21  RIGHT VENTRICLE: RV Basal 3.20 cm RV Mid   2.70 cm RV Major 6.9 cm TAPSE:   24.0 mm TRICUSPID VALVE/RVSP:                             Normal Ranges: Peak TR Velocity: 3.25 m/s Est. RA Pressure: 8 mmHg RV Syst Pressure: 50.2 mmHg (< 30mmHg) IVC Diam:         2.20 cm PULMONIC VALVE:                         Normal Ranges: PV Accel Time: 58 msec  (>120ms) PV Max Neftali:    1.2 m/s  (0.6-0.9m/s) PV Max P.1 mmHg Pulmonary Veins: PulmV A Revs Dur: 129.18 msec PulmV A Revs Neftali: 24.50 cm/s PulmV Walker Neftali:   34.42 cm/s PulmV S/D Neftali:    0.79 PulmV Sys Neftali:    27.15 cm/s  [id number] Marlon Byrne MD Electronically signed on 10/14/2023 at 12:47:38 PM  ** Final **     CT head wo IV contrast    Result Date: 10/14/2023  Interpreted By:  Chris Ortez, STUDY: CT HEAD WO IV CONTRAST;  10/14/2023 4:13 am   INDICATION: confusion r/out bleed.   COMPARISON: Head CT dated 2022.   ACCESSION NUMBER(S): [id number]   ORDERING CLINICIAN: BEL ALVA   TECHNIQUE: Axial noncontrast CT images of the head.   FINDINGS:  Gray-white matter differentiation is maintained. There are no extra-axial collections. No mass effect or midline shift. There is no hydrocephalus. There is generalized cerebral volume loss and associated ventricular and sulcal enlargement. There are confluent periventricular and deep white matter hypodensities suggestive of moderate to severe chronic microvascular ischemic change.   HEMORRHAGE: No acute intracranial hemorrhage.   CALVARIUM: No depressed skull fracture.   EXTRACRANIAL SOFT TISSUES:.. Unremarkable.   PARANASAL SINUSES/MASTOIDS: The visualized paranasal sinuses are well aerated. Mastoid air cells are clear.   ORBITS: Bilateral cataract surgery.       No evidence of acute cortical infarct or acute intracranial hemorrhage. Moderate to advanced chronic microvascular ischemic changes.     Signed by: Chris Ortez 10/14/2023 4:41 AM Dictation workstation:   [id number]    XR chest 1 view    Result Date: 10/13/2023  Interpreted By:  Ryan Lopez, STUDY: XR CHEST 1 VIEW;  10/13/2023 7:47 pm   INDICATION: Signs/Symptoms:pneumonia.   COMPARISON: CXR 10/6/2023, CT 10/2/2023   ACCESSION NUMBER(S): [id number]   ORDERING CLINICIAN: BEL ALVA   FINDINGS: There is cardiomegaly. Atherosclerotic calcification of the thoracic aorta. The patient has known cavitary mass in the right upper lobe is not well assessed on the chest radiograph. There is stable asymmetric elevation right diaphragm and right basilar atelectatic or consolidative opacity. There is also mild left basilar atelectasis. No pneumothorax.       Cavitary mass in the right upper lobe is not well assessed on the plain radiograph and better assessed on the recent 10/2/2023 CT chest examination.   Stable elevation of the right diaphragm and right basilar atelectasis or airspace disease. Left basilar subsegmental atelectasis.     MACRO: None   Signed by: Ryan Lopez 10/13/2023 8:02 PM Dictation workstation:   [id number]    FL modified barium  swallow study    Result Date: 10/6/2023  Interpreted By:  Matilda Evangelista and Gennert Zara STUDY: FL MODIFIED BARIUM SWALLOW STUDY;; 10/6/2023 11:12 am   INDICATION: Signs/Symptoms:aspiration pneumonia.   COMPARISON: None.   ACCESSION NUMBER(S): [id number]   ORDERING CLINICIAN: MOLLY ADAMES   TECHNIQUE: MBSS completed. Informed verbal consent obtained prior to completion of exam. Trials of thin, nectar thick, honey thick, puree, and regular solids given. Fluoroscopy time :  2 minutes 27 seconds. 887 video fluoroscopic images were obtained.   SLP: Marcia Arrington   SPEECH FINDINGS: Reason for referral: Dysphagia Patient hx: Pt reports recent coughing d/t respiratory concerns. Pt reports h/o completing MBS approx 7-8 years ago. Respiratory status: Nasal cannula Previous diet: Cardiac   FINAL SPEECH RECOMMENDATIONS   Diet recommendations/feeding strategies: Regular solids, Honey/Moderately-thick liquids. Sit upright to eat and remain upright for 30-60 minutes. Small bites/sips. Alternate solids/liquids. No Straws.   Follow-up speech therapy recommended: Yes.   Education provided: Yes. Reviewed initial results of today's study.   Repeat study/ dc plan: After course of ST/Swallow tx.   Mechanics of the swallow summary: *Oral phase: Anatomy appears WFL. Loss of bolus into pharynx prior to swallow with solids and all liquids. *Pharyngeal phase: Anatomy appears WFL. Loss of bolus to valleculae prior to swallow with solids and all liquids; loss to pyriform sinuses with moderately/honey-thick, mildly/nectar-thick liquids. Residue noted at valleculae and posterior pharyngeal wall (level of C3-C4) with all consistencies. Laryngeal penetration with mildly/nectar-thick liquids and thin liquids by cup, with trace residue that silently dripped to TVF to become aspirated. Brendon silent laryngeal aspiration noted with thin liquids by straw. *Esophageal phase: Adequate UES opening with appropriate clearance of bolus trials. Mild residue  noted inferior to UES at level of C5-C6.   SLP impressions with severity rating: Pt presents with severe pharyngeal dysphagia, silent aspiration with thin and nectar/mildly thick liquids.   Thin Liquids (MBSS) Rosenbek's Penetration Aspiration Scale, Thin Liquids (MBSS): 8. SILENT ASPIRATION - contrast passes glottis, visible residue, NO immediate pt response 20ml by cup, 60ml by straw Response to Aspiration, Thin Liquid (MBSS): absent response, silent aspiration, productive volitional cough following clinician cue, productive reflexive involuntary cough, Delayed reaction to visualized aspiration of 5+ seconds Response to Pharyngeal Residue, Thin Liquid (MBSS): spontaneous clearing, unable to clear with multiple swallows,   Nectar Thick Liquids (MBSS) Rosenbek's Penetration Aspiration Scale, Nectar thick liquids (MBSS): 5. DEEP PENETRATION with HIGH ASPIRATION risk - contrast contacts vocal cords, visible residue 20ml by cup, 60ml by straw Response to Aspiration, Nectar thick liquids (MBSS): absent response, silent aspiration, Response to Pharyngeal Residue, Nectar thick liquids (MBSS): spontaneous clearing, unable to clear with multiple swallows,   Honey Thick Liquids (MBSS) Rosenbek's Penetration Aspiration Scale, Honey thick liquids (MBSS): 1. NO ASPIRATION & NO PENETRATION - no aspiration, contrast does not enter airway 20ml by cup Response to Pharyngeal Residue, Honey thick liquids (MBSS): spontaneous clearing,   Purees (MBSS) Rosenbek's Penetration Aspiration Scale, Purees (MBSS): 1. NO ASPIRATION & NO PENETRATION - no aspiration, contrast does not enter airway 3x 5ml by spoon Response to Pharyngeal Residue, Purees (MBSS): spontaneous clearing,   Solids (MBSS) Rosenbek's Penetration Aspiration Scale, Solids (MBSS): 1. NO ASPIRATION & NO PENETRATION - no aspiration, contrast does not enter airway 2x 1/8th piece raoul cracker coated in barium, Barium tablet x1 with thin liquid wash Response to Pharyngeal  Residue, Solids (MBSS): spontaneous clearing,   Speech Therapy section of this report signed by BIN Venegas-SLP on 10/6/2023 at 11:20 am.   RADIOLOGY FINDINGS: There was aspiration with thin liquids. This was reported to be silent aspiration. Additional observations and recommendations will be provided by the speech pathologist.   There is a minimal spondylolisthesis at C4-C5. There is disc space narrowing C3-C7.   There is surgical clips in the neck consistent with previous surgery.   Radiology section of this report signed by Matilda Evangelista.       Aspiration with thin liquids.   MACRO: None   Signed by: Matilda Evangelista 10/6/2023 11:53 AM Dictation workstation:   [id number]    XR chest 1 view    Result Date: 10/6/2023  Interpreted By:  Travis Calderon, STUDY: XR CHEST 1 VIEW;  10/6/2023 9:23 am   INDICATION: Signs/Symptoms:evaluate for pnum,othorax post lung biopsy.   COMPARISON: Most recent prior chest x-rays from 10/05/2023. CT scan chest from 10/02/2023. Images from CT-guided biopsy posterior right upper lobe cavitary mass dated 10/05/2023..   ACCESSION NUMBER(S): [id number]   ORDERING CLINICIAN: INGE BUTTS   TECHNIQUE: Single AP portable view of the chest was obtained.   FINDINGS: MEDIASTINUM/ LUNGS/ KATIE: Cardiomegaly without gross vascular congestion. No pleural effusion on the left. Elevation of the right diaphragm. Mild persistent pleural and parenchymal opacities at the right base. Stable calcification in the aorta. Cavitary mass in the posterior paraspinal mid right upper lobe on CT scan is not readily apparent on chest x-ray. No pneumothorax. No tracheal deviation. No abnormal hilar fullness or gross mass on either side. Stable surgical clips in the lower left neck.   BONES: No lytic or blastic destructive bone lesion. Moderate right glenohumeral joint space narrowing with spur formation.  There is mild disc space narrowing and endplate osteophytosis throughout the thoracic spine.   UPPER  ABDOMEN: Grossly intact.         Mid posteromedial right upper lobe cavitary mass on CT scan dated 10/02/2023 is not readily apparent on plain films. Patient had percutaneous needle biopsy under CT guidance of this mass yesterday. . No pneumothorax.   Stable underlying elevation of the right diaphragm.   Persistent pleural and parenchymal opacities at the right base could be pleural and parenchymal scarring. A component pleural fluid with pneumonia is not excluded.   Cardiomegaly without indication of CHF.   Signed by: Travis Calderon 10/6/2023 9:38 AM Dictation workstation:   [id number]    CT guided percutaneous biopsy lung    Result Date: 10/6/2023  Interpreted By:  Marcell Willingham, STUDY: CT GUIDED PERCUTANEOUS BIOPSY LUNG;  10/5/2023 2:32 pm   INDICATION: Signs/Symptoms:CT biopsy RUL nodule.   COMPARISON: None.   ACCESSION NUMBER(S): [id number]   ORDERING CLINICIAN: ANITHA TREVINO   TECHNIQUE: INTERVENTIONALIST(S): Marcell Willingham MD   CONSENT: The patient/patient's POA/next of kin was informed of the nature of the proposed procedure. The purposes, alternatives, risks, and benefits were explained and discussed. All questions were answered and consent was obtained.   RADIATION EXPOSURE: Total DLP: 259 mGy*cm     SEDATION: Moderate conscious IV sedation services (supervision of administration, induction, and maintenance) were provided by the physician performing the procedure with intravenous fentanyl  and versed  for 20 minutes. The physician was assisted by an independent trained observer, an interventional radiology nurse, in the continuous monitoring of patient level of consciousness and physiologic status.   MEDICATION/CONTRAST: No additional   TIME OUT: A time out was performed immediately prior to procedure start with the interventional team, correctly identifying the patient name, date of birth, MRN, procedure, anatomy (including marking of site and side), patient position, procedure consent form, relevant  laboratory and imaging test results, antibiotic administration, safety precautions, and procedure-specific equipment needs.   COMPLICATIONS: No immediate adverse events identified.   FINDINGS: The patient was placed in the right decubitus position on the CT table. A planning CT was performed and demonstrated the target right upper lobe mass. A trajectory was planned and the site was marked. The area was prepped and draped in the usual sterile fashion. Lidocaine was used for local anesthesia. Under CT guidance, a 17 gauge introducer needle was advanced into the target nodule and the stylet was removed. 5 passes were made using an 18 gauge cutting needle yielding fragmented core samples, which were placed in formalin. A BioSentry device was deployed through the introducer needle and the introducer needle was removed. Hemostasis was obtained with manual compression. A postprocedure limited CT demonstrated minimal hemorrhage in the area of biopsy without pneumothorax. A sterile dressing was applied. The patient tolerated the procedure well and there were no immediate complications.       Successful CT-guided core biopsy of the target right upper lobe lung nodule.   I was present for and/or performed the critical portions of the procedure and immediately available throughout the entire procedure.   I personally reviewed the image(s)/study and interpretation. I agree with the findings as stated.   Performed and dictated at [facility].   Signed by: Marcell Willingham 10/6/2023 8:25 AM Dictation workstation:   [id number]    XR chest 1 view    Result Date: 10/5/2023  Interpreted By:  Travis Calderon, STUDY: XR CHEST 1 VIEW;  10/5/2023 4:16 pm   INDICATION: Signs/Symptoms:s/p RUL lung biopsy.   COMPARISON: Chest CT scan from 10/02/2023. Chest x-rays, most recent from 10/01/2023.   ACCESSION NUMBER(S): [id number]   ORDERING CLINICIAN: MARCELL WILLINGHAM   TECHNIQUE: Single AP portable view of the chest  was obtained.   FINDINGS: MEDIASTINUM/ LUNGS/ KATIE: Cardiomegaly without gross vascular congestion. No pleural effusion on the left. Elevation of the right diaphragm. Mild persistent pleural and parenchymal opacities at the right base. Stable calcification in the aorta. Cavitary mass in the posterior paraspinal mid right upper lobe on CT scan is not readily apparent on chest x-ray. No pneumothorax. No tracheal deviation. No abnormal hilar fullness or gross mass on either side. Stable surgical clips in the lower left neck.   BONES: No lytic or blastic destructive bone lesion. Moderate right glenohumeral joint space narrowing with spur formation.  There is mild disc space narrowing and endplate osteophytosis throughout the thoracic spine.   UPPER ABDOMEN: Grossly intact.       Mid posteromedial right upper lobe cavitary mass on CT scan dated 10/02/2023 is not readily apparent on plain films. Patient had percutaneous needle biopsy under CT guidance of this mass earlier this afternoon. No pneumothorax.   Stable underlying elevation of the right diaphragm.   Persistent pleural and parenchymal opacities at the right base could be pleural and parenchymal scarring. A component pleural fluid with pneumonia is not excluded.   Cardiomegaly without indication of CHF.   Signed by: Travis Calderon 10/5/2023 4:39 PM Dictation workstation:   [id number]    CT chest w IV contrast    Result Date: 10/2/2023  Interpreted By:  Travis Calderon, STUDY: CT CHEST W IV CONTRAST;  10/2/2023 4:25 pm   INDICATION: 81 y/o   F with  Signs/Symptoms:rul mass.   LIMITATIONS: None.   ACCESSION NUMBER(S): [id number]   ORDERING CLINICIAN: MOLLY ADAMES   TECHNIQUE: After the administration of IV nonionic contrast, thin-section images were obtained  from the thoracic inlet down through the diaphragm. Sagittal and coronal reconstruction images were generated. Mediastinal, lung, bone, and liver windows were reviewed.   IV contrast agent was Omnipaque 350, 75  mL   COMPARISON: CT scan chest from 02/04/2022 and CT scan chest from 04/20/2022. correlation with chest x-ray from 10/01/2023.   FINDINGS: CHEST WALL/BASE OF THE NECK: The chest wall was grossly intact. No thyromegaly or thyroid mass.   LUNGS/ PLEURA/ AND TRACHEA: Underlying emphysematous changes in the lungs. There is mild persistent infiltrative density with some associated bronchial wall thickening but also some volume loss indicating complicating atelectasis, in the right lower lobe. The findings are improved from the previous exams. There is mild new atelectasis in the right middle lobe anteromedially. There is a lobulated mass abutting against the paraspinal pleural surface in the posteromedial right upper lobe, with a small central cavitation. This mass measures 24 x 24 mm today, increased from 12 mm maximal diameter on 04/20/2022. Punctate pleural based nodule in the mid anterolateral left upper lobe on image 116, unchanged. Small new area linear density in the anteromedial left upper lobe centered on axial image 166. Small stable area parenchymal density in the paraspinal left lower lobe centered on image 237. No pleural effusion. No pneumothorax. The trachea was grossly intact.   MEDIASTINUM/KATIE: Mild but suspicious central mediastinal adenopathy measuring up to 11 x 24 mm on axial image 17. No suspicious left hilar adenopathy. There is some ill-defined soft tissue in the lower aspect of the right hilum related to the chronic atelectasis/pneumonia in the right lower lobe described above. No other adenopathy in the right hilum. No axillary adenopathy. Mild cardiomegaly. Advanced four-vessel coronary artery calcifications. No pericardial effusion. Advanced mural calcifications throughout the thoracic aorta. No thoracic aortic aneurysm or dissection. No central pulmonary artery filling defect.   BONES: No destructive lytic or blastic bone lesion. Mild accentuation of distal thoracic kyphosis. Scattered mild  endplate osteophytosis. Mild stable loss of height at T3. Prominent stable compression fracture at T7. Moderate stable compression fracture at T10. Stable superior endplate Schmorl's node at T11 with slight stable anterior wedging at T11. Moderate compression fracture with anterior wedging at T12, unchanged. Mild stable superior endplate compression fracture with anterior wedging at L1. The sternum is intact.   UPPER ABDOMEN: Small hiatal hernia. Multiple bilateral renal cysts. The largest on the right measures 32 x 22 mm and the largest on the left measures 47 x 24 mm. Advanced mural calcifications throughout the proximal abdominal aorta. There are diffuse bilateral renal artery calcifications, diffuse splenic artery calcifications, and mild moderate hepatic artery and SMA calcifications. Otherwise, the imaged upper abdomen was grossly intact.       Underlying emphysema. Small area of stable pleural and parenchymal density in the paraspinal left lower lobe, most likely scarring. Mild new atelectasis in the anteromedial right middle lobe and new area atelectasis or scarring in the mid anteromedial left upper lobe.   Persistent infiltrative densities in the mid to lower right lower lobe, most likely scarring and atelectasis. Chronic residual component of previous pneumonia is not excluded.   Increase in size of suspicious cavitary nodule in the paraspinal right upper lobe. Cavitary primary lung carcinoma (squamous cell carcinoma) to be excluded. Metastatic lesion from an unknown primary would be a differential consideration. Cavitary infection is considered less likely.   Mild but suspicious central mediastinal adenopathy. Consider PET-CT scan in follow-up.   Cardiomegaly. Advanced four-vessel coronary artery calcifications.   Advanced mural calcifications throughout the thoracic aorta and imaged abdominal aorta. No thoracic or abdominal aortic aneurysm in this exam. There are additional calcifications in the  abdominal vessels as described.   Small hiatal hernia.   Bilateral renal cysts.   Multiple stable spinal compression fractures as described.   MACRO: None   Signed by: Travis Calderon 10/2/2023 4:52 PM Dictation workstation:   [id number]    CT soft tissue neck w IV contrast    Result Date: 10/2/2023  Interpreted By:  Jaron Carlson, STUDY: CT SOFT TISSUE NECK W IV CONTRAST;  10/2/2023 2:09 am   INDICATION: Signs/Symptoms:narrowing of trachea difficulty swallowing.   COMPARISON: None.   ACCESSION NUMBER(S): [id number]   ORDERING CLINICIAN: MOLLY ADAMES   TECHNIQUE: Following intravenous injection  axial CT was performed from the skullbase to the thoracic inlet and multiplanar reconstructions were made.   FINDINGS: *  The visualized paranasal sinuses nasopharynx and oropharynx are unremarkable. *The mandible, maxilla and temporomandibular joints are normal. *The major salivary glands are normal. *There is no measurable cervical lymphadenopathy. *The larynx and related cartilages are normal. *The thyroid gland is normal. *The thoracic inlet is normal. *The airway and visualized esophagus are normal *There are abundant calcifications of the thoracic aorta and the great vessels. There is marked dilatation to a proximally 2 cm maximum diameter, at the left carotid bifurcation with peripheral calcification. There is no evidence of intraluminal thrombus *There is a 2 cm mass with central necrosis or emphysematous bulla at the right pulmonary apex that can not be further characterized. This nodule has increased in size compared to the previous examination dated April 2022 neoplasm not excluded.       * There is no evidence of mass, cyst or adenopathy in the neck. 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 Notification was initiated on 10/2/2023 at 7:19 am by  Jaron Carlson. (**-OCF-**) Instructions:     [place]AM[place] [facility] [place]   Signed by: Jaron Carlson 10/2/2023 7:21 AM Dictation workstation:   [id number]    XR chest 2 views    Result Date: 10/1/2023  Interpreted By:  Matilda Evangelista, STUDY: XR CHEST 2 VIEWS;  10/1/2023 1:05 pm   INDICATION: Signs/Symptoms:cough + wheezing r/o underlying infiltrate.   COMPARISON: 10/25/2022   ACCESSION NUMBER(S): [id number]   ORDERING CLINICIAN: MAXIMUS ARRIAGA   FINDINGS: The cardiac silhouette is prominent. There are atherosclerotic changes of the aorta.   The lungs are hyperinflated. There is interstitial lung disease. There is more confluent density at the right base.   There is no definite pleural fluid.   There are degenerative changes of the spine. There are compression fractures. The bones are osteoporotic.   There is surgical clips in the neck on the left.   COMPARISON OF FINDING: The right lung is improved in appearance.       Prominent cardiac silhouette.   COPD.   Right basilar infiltrate.   Signed by: Matilda Evangelista 10/1/2023 1:08 PM Dictation workstation:   [id number]          Assessment/Plan   Norah Cleaning is a 83 y.o. female on day 6 of admission presenting with Acute pneumonia.  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)    Aortic stenosis, severe    Non-ischemic myocardial injury (non-traumatic)    Pulmonary HTN (CMS/HCC)    Acute diastolic HF (heart failure) (CMS/HCC)    Compression fracture of L4 lumbar vertebra, sequela    Plan:  Concern for lung malignancy and work-up is in progress  Currently on antibiotics along with breathing treatment for pneumonia  Cardiology consult is noted and recommended IV diuretic with strict I's and O's  The recommended transitioning to oral diuretics on 10/20/2021  Discharge planning   submitted for prior authorization for  skilled nursing facility  Patient on her baseline O2 requirement  Patient does not appear to have ACS  Awaiting MRI results  Follow-up with pulmonary services outpatient  Continue with aspirin, atorvastatin  Can continue with Lovenox  Continue with Lasix 40 mg twice a day and switch to p.o. tomorrow  On Levaquin IV  Losartan is on hold  Continue with metoprolol  Continue with sildenafil/Revatio for pulmonary hypertension     Plan discussed with patient at bedside  Disposition: Discharged to skilled nursing facility once MRI brain results are available and insurance precertification has been obtained  High level of MDM based on above issue and discussing plan    This note is created using voice recognition software. All efforts are made to minimize errors, if there are errors there due to transcription.    Christiano Roca  Hospitalist

## 2023-10-20 NOTE — PROGRESS NOTES
Occupational Therapy                 Therapy Communication Note    Patient Name: Norah Cleaning  MRN: [id number]  Today's Date: 10/20/2023     Discipline: Occupational Therapy    Missed Visit Reason: Missed Visit Reason:  (off floor for MRI)    Missed Time: Attempt

## 2023-10-20 NOTE — PROGRESS NOTES
Physical Therapy                 Therapy Communication Note    Patient Name: Norah Cleaning  MRN: [id number]  Today's Date: 10/20/2023     Discipline: Physical Therapy    Missed Visit Reason: Missed Visit Reason:  (pt stated she just got done eating and using the restroom, declined all activity at this time)    Missed Time: Attempt    Comment:

## 2023-10-20 NOTE — PROGRESS NOTES
Subjective Data:  Patient resting, no complaints of chest pain, palpitations, dyspnea, dizziness or lightheadedness at rest.  Reports optimal urinary response to IV diuretic therapy.        Events:    Late entry, saw patient earlier today at 3 PM.  Reevaluated patient d/t documented hypotension.  Patient's SBP in the 70s to 80s, she is asymptomatic.  Holding diuretics and losartan.     Objective Data:  Last Recorded Vitals:  Vitals:    10/20/23 0347 10/20/23 0805 10/20/23 0812 10/20/23 1128   BP: 124/68 142/70  129/58   BP Location: Left arm      Patient Position: Lying      Pulse: 72 73     Resp: 18 18     Temp: 36 °C (96.8 °F) 36.6 °C (97.9 °F)     TempSrc: Temporal      SpO2: 98% 100% 100%    Weight:       Height:           Last Labs:  CBC - 10/17/2023:  6:07 AM  11.3 8.9 333    30.2      CMP - 10/20/2023:  6:02 AM  8.5 5.8 12 --- 0.5   _ 3.0 4 40      PTT - No results in last year.  1.1   12.9 _     TROPHS   Date/Time Value Ref Range Status   10/14/2023 06:56  0 - 13 ng/L Final     Comment:     Previous result verified on 10/13/2023 2019 on specimen/case [id number] called with component [facility] for procedure Troponin I, High Sensitivity with value 813 ng/L.   10/14/2023 02:41  0 - 13 ng/L Final     Comment:     Previous result verified on 10/13/2023 2019 on specimen/case [id number] called with component [facility] for procedure Troponin I, High Sensitivity with value 813 ng/L.   10/14/2023 12:23  0 - 13 ng/L Final     Comment:     Previous result verified on 10/13/2023 2019 on specimen/case [id number] called with component [facility] for procedure Troponin I, High Sensitivity with value 813 ng/L.     BNP   Date/Time Value Ref Range Status   10/14/2023 06:53  0 - 99 pg/mL Final   10/01/2023 01:00  0 - 99 pg/mL Final     HGBA1C   Date/Time Value Ref Range Status   03/25/2022 04:59 AM 5.4 % Final     Comment:          Diagnosis of Diabetes-Adults   Non-Diabetic: < or = 5.6%    Increased risk for developing diabetes: 5.7-6.4%   Diagnostic of diabetes: > or = 6.5%  .       Monitoring of Diabetes                Age (y)     Therapeutic Goal (%)   Adults:          >18           <7.0   Pediatrics:    13-18           <7.5                   7-12           <8.0                   0- 6            7.5-8.5   American Diabetes Association. Diabetes Care 33(S1), Jan 2010.     03/07/2021 05:41 AM 7.2 % Final     Comment:          Diagnosis of Diabetes-Adults   Non-Diabetic: < or = 5.6%   Increased risk for developing diabetes: 5.7-6.4%   Diagnostic of diabetes: > or = 6.5%  .       Monitoring of Diabetes                Age (y)     Therapeutic Goal (%)   Adults:          >18           <7.0   Pediatrics:    13-18           <7.5                   7-12           <8.0                   0- 6            7.5-8.5   American Diabetes Association. Diabetes Care 33(S1), Jan 2010.       VLDL   Date/Time Value Ref Range Status   03/24/2022 06:01 AM 22 0 - 40 mg/dL Final   11/18/2020 10:34 AM 28 0 - 40 mg/dL Final   09/20/2019 09:53 AM 25 0 - 40 mg/dL Final      Last I/O:  No intake/output data recorded.    Past Cardiology Tests (Last 3 Years):    Echo:  Transthoracic Echo (TTE) Complete 10/14/2023  CONCLUSIONS:   1. Left ventricular systolic function is normal with a 60-65% estimated ejection fraction.   2. There is moderate concentric left ventricular hypertrophy.   3. Moderate to severely elevated right ventricular systolic pressure.   4. Aortic valve appears abnormal.   5. Aortic valve: Peak gradient 65.1 mmHg, mean gradient 41 mmHg, DHAVAL 0.7cm2.   6. Severe aortic valve stenosis.          Inpatient Medications:  Scheduled medications   Medication Dose Route Frequency    acetaminophen  650 mg oral TID    albuterol  2.5 mg nebulization TID    aspirin  81 mg oral Daily    atorvastatin  40 mg oral Nightly    budesonide  0.5 mg nebulization BID    cholecalciferol  50 mcg oral BID    DULoxetine  30 mg oral Daily     enoxaparin  40 mg subcutaneous Daily    [Held by provider] ferrous sulfate  65 mg of iron oral Daily    formoterol  20 mcg nebulization BID    furosemide  40 mg intravenous BID    gabapentin  200 mg oral TID    guaiFENesin  600 mg oral BID    ketotifen  1 drop Both Eyes BID    lactobacillus acidophilus  1 capsule oral Daily    levoFLOXacin  750 mg intravenous q48h    [Held by provider] losartan  50 mg oral Daily    melatonin  4.5 mg oral Nightly    metoprolol succinate XL  12.5 mg oral Daily    montelukast  10 mg oral Daily    pantoprazole  40 mg oral Daily before breakfast    polyethylene glycol  17 g oral BID    sennosides  1 tablet oral BID    sildenafil  20 mg oral TID     PRN medications   Medication    acetaminophen    acetaminophen    acetaminophen    albuterol    benzonatate    bisacodyl    guaiFENesin    ondansetron    Or    ondansetron    oxyCODONE-acetaminophen    oxygen     Continuous Medications   Medication Dose Last Rate    [Held by provider] sodium chloride 0.9%  125 mL/hr 125 mL/hr (10/18/23 0032)       Physical Exam:  Frail elderly female sitting up in NAD. .   CV rrr, + cardiac murmur; No leg edema. Pulses 2+ and symmetric.    Pulm: Lungs clear with normal respiratory effort.   Neuro Alert and conversant. Grossly nonfocal.      Assessment/Plan   Norah Cleaning is a 83 y.o. female with past medical history significant for hypertension, chronic diastolic heart failure, COPD on chronic home oxygen, emphysema, new diagnosis of lung cancer, moderate AS ,obstructive sleep apnea, AAA, nonobstructive CAD per cardiac cath 2008 presenting to emergency room complaining of worsening of dyspnea from facility, concerns for pneumonia.  Lab work remarkable for elevated troponin with relatively flat trend at 813, 966, 791, AURA with Cr 1.46( 0.94 on 10/6/23).  Cardiology is consulted for further evaluation of non-ST elevation myocardial infarction.     Currently not on telemetry      Acute diastolic HF.   Appears euvolemic on exam  Last TTE (10/14/23) EF 60-65%, severe AS  With concomitant PNA.  -Optimal urinary response to IV diuretic therapy  -Was on torsemide 10 mg p.o. daily outpatient  - This evening Holding diuretic therapy and losartan d/t hypotension  -Can readdress re-initiation when follows with outpatient cardiologist     2. Aortic stenosis  Severe. Not a TAVR candidate.      3. Hypertension   -BP' currently soft  -Same as #1     4. Elevated troponin  Non-MI troponin elevation / acute non-traumatic myocardial injury. Core measures do not apply.      After discussion with cardiologist Dr. Luu:  Patient is currently hypotensive sbp 80-90's> asymptomatic  We will recheck BNP in the morning  Hold losartan and diuretic therapy for now> we will readdress reinitiation at her outpatient cardiology follow-up  Will request cardiology follow-up with Dr. Holbrook post discharge  If patient becomes symptomatic with hypotension, consider giving 250 ml fluid bolus        Code Status:  DNR          Rhina Siddiqi, APRN-CNP

## 2023-10-21 NOTE — CARE PLAN
The patient's goals for the shift include pain control    The clinical goals for the shift include pain control effective this shift    Problem: Fall/Injury  Goal: Not fall by end of shift  Outcome: Progressing

## 2023-10-21 NOTE — PROGRESS NOTES
Norah Cleaning is a 83 y.o. female on day 7 of admission presenting with Acute pneumonia.      Subjective   Patient feeling well.  No shortness of breath.   starting authorization for skilled facility.  Normally uses 3 L nasal cannula.  Awaiting MRI brain       Objective     Last Recorded Vitals  /62   Pulse 83   Temp 36.8 °C (98.2 °F)   Resp 18   Wt 50.1 kg (110 lb 7.2 oz)   SpO2 96%   Intake/Output last 3 Shifts:    Intake/Output Summary (Last 24 hours) at 10/21/2023 0900  Last data filed at 10/21/2023 0500  Gross per 24 hour   Intake --   Output 600 ml   Net -600 ml       Admission Weight  Weight: 46.7 kg (103 lb) (10/13/23 1918)    Daily Weight  10/19/23 : 50.1 kg (110 lb 7.2 oz)      Physical Exam:  General: Not in acute distress, alert.  On 3 L nasal cannula  HEENT: PERRLA, head intact and normocephalic  Neck: Normal to inspection  Lungs: Clear to auscultation, work of breathing within normal limit  Cardiac: Regular rate and rhythm  Abdomen: Soft nontender, positive bowel sounds  : Exam deferred  Skin: Intact  Hematology: No petechia or excessive ecchymosis  Musculoskeletal: Without significant trauma  Neurological: Alert awake oriented, no focal deficit, cranial nerves grossly intact  Psych: No suicidal ideation or homicidal ideation    Relevant Results  Scheduled medications  acetaminophen, 650 mg, oral, TID  albuterol, 2.5 mg, nebulization, TID  aspirin, 81 mg, oral, Daily  atorvastatin, 40 mg, oral, Nightly  budesonide, 0.5 mg, nebulization, BID  cholecalciferol, 50 mcg, oral, BID  DULoxetine, 30 mg, oral, Daily  enoxaparin, 40 mg, subcutaneous, Daily  [Held by provider] ferrous sulfate, 65 mg of iron, oral, Daily  formoterol, 20 mcg, nebulization, BID  gabapentin, 200 mg, oral, TID  guaiFENesin, 600 mg, oral, BID  ketotifen, 1 drop, Both Eyes, BID  lactobacillus acidophilus, 1 capsule, oral, Daily  levoFLOXacin, 750 mg, intravenous, q48h  magnesium sulfate, 2 g, intravenous,  Once  melatonin, 4.5 mg, oral, Nightly  metoprolol succinate XL, 12.5 mg, oral, Daily  montelukast, 10 mg, oral, Daily  pantoprazole, 40 mg, oral, Daily before breakfast  polyethylene glycol, 17 g, oral, BID  potassium chloride CR, 40 mEq, oral, Once  sennosides, 1 tablet, oral, BID  sildenafil, 20 mg, oral, TID      Continuous medications  [Held by provider] sodium chloride 0.9%, 125 mL/hr, Last Rate: 125 mL/hr (10/21/23 0416)      PRN medications  PRN medications: acetaminophen, acetaminophen, acetaminophen, albuterol, benzonatate, bisacodyl, guaiFENesin, ondansetron **OR** ondansetron, oxyCODONE-acetaminophen, oxygen   Results for orders placed or performed during the hospital encounter of 10/13/23 (from the past 24 hour(s))   Magnesium   Result Value Ref Range    Magnesium 1.50 (L) 1.60 - 2.40 mg/dL   Basic Metabolic Panel   Result Value Ref Range    Glucose 71 (L) 74 - 99 mg/dL    Sodium 137 136 - 145 mmol/L    Potassium 3.3 (L) 3.5 - 5.3 mmol/L    Chloride 93 (L) 98 - 107 mmol/L    Bicarbonate 35 (H) 21 - 32 mmol/L    Anion Gap 12 10 - 20 mmol/L    Urea Nitrogen 7 6 - 23 mg/dL    Creatinine 0.84 0.50 - 1.05 mg/dL    eGFR 69 >60 mL/min/1.73m*2    Calcium 8.8 8.6 - 10.3 mg/dL   CBC and Auto Differential   Result Value Ref Range    WBC 6.5 4.4 - 11.3 x10*3/uL    nRBC 0.0 0.0 - 0.0 /100 WBCs    RBC 3.00 (L) 4.00 - 5.20 x10*6/uL    Hemoglobin 8.9 (L) 12.0 - 16.0 g/dL    Hematocrit 29.1 (L) 36.0 - 46.0 %    MCV 97 80 - 100 fL    MCH 29.7 26.0 - 34.0 pg    MCHC 30.6 (L) 32.0 - 36.0 g/dL    RDW 14.6 (H) 11.5 - 14.5 %    Platelets 248 150 - 450 x10*3/uL    MPV 9.4 7.5 - 11.5 fL    Neutrophils % 65.5 40.0 - 80.0 %    Immature Granulocytes %, Automated 0.5 0.0 - 0.9 %    Lymphocytes % 20.0 13.0 - 44.0 %    Monocytes % 10.2 2.0 - 10.0 %    Eosinophils % 3.5 0.0 - 6.0 %    Basophils % 0.3 0.0 - 2.0 %    Neutrophils Absolute 4.25 1.60 - 5.50 x10*3/uL    Immature Granulocytes Absolute, Automated 0.03 0.00 - 0.50 x10*3/uL     Lymphocytes Absolute 1.30 0.80 - 3.00 x10*3/uL    Monocytes Absolute 0.66 0.05 - 0.80 x10*3/uL    Eosinophils Absolute 0.23 0.00 - 0.40 x10*3/uL    Basophils Absolute 0.02 0.00 - 0.10 x10*3/uL   B-type Natriuretic Peptide   Result Value Ref Range     (H) 0 - 99 pg/mL        XR chest 1 view    Result Date: 10/19/2023  Interpreted By:  Luci Taylor, STUDY: XR CHEST 1 VIEW;  10/19/2023 10:09 am   INDICATION: Signs/Symptoms:COUGH, SOB.   COMPARISON: 10/15/2023   ACCESSION NUMBER(S): [id number]   ORDERING CLINICIAN: NIA MIGUEL   FINDINGS: Coarsened interstitial lung markings are again seen. Elevation of the right hemidiaphragm is again noted. Surgical clips are noted in the neck.   Heart is mildly enlarged but is unchanged. Round density seen in the right upper lobe in the paratracheal region, which most likely is related to patient's known mass that was seen on the CT of 10/16/2023       Mild cardiomegaly.   Chronic lung changes.   Round density in the right upper lobe which corresponds to a mass was seen on the recent CT scan.       MACRO: None   Signed by: Luci Taylor 10/19/2023 10:23 AM Dictation workstation:   [id number]    CT abdomen pelvis w IV contrast    Result Date: 10/18/2023  Interpreted By:  Etelvina Brown, STUDY: CT ABDOMEN PELVIS W IV CONTRAST;  10/17/2023 6:06 pm   INDICATION: Signs/Symptoms:staging for cancer.   COMPARISON: 01/02/2021 and CT chest 10/16/2023   ACCESSION NUMBER(S): [id number]   ORDERING CLINICIAN: NIA MIGUEL   TECHNIQUE: CT of the abdomen and pelvis was performed. Sagittal and coronal reconstructions were generated.    75 cc Omnipaque 350 intravenous contrast given for the exam.   FINDINGS:     ABDOMINAL ORGANS:   LIVER: Prominent left lobe. No focal mass lesion.   GALL BLADDER AND BILIARY TREE: No calcified gallstone or gallbladder wall thickening. Slight fullness of the intrahepatic biliary tree versus periportal edema.   SPLEEN: No definite focal lesion. Small  isodense presumed splenule adjacent to the medial inferior pole.   PANCREAS: Atrophic. No definite mass lesion.   ADRENALS: No adrenal mass   KIDNEYS AND URETERS: Multiple rounded hypodense lesions arising from both kidneys. 1 of the largest in the mid left kidney measures 4.7 x 3.0 cm is similar in size to the prior exam and likely has a punctate mural calcification which is unchanged. No hydronephrosis.   BOWEL: Small hiatal hernia. Stomach is distended with fluid and heterogenous debris. Decompressed small bowel loops located in the right mid abdomen. The cecum is located in the posterior pelvis. Moderate colonic fecal residue. Dense likely retained contrast material within probable diverticula along the sigmoid colon. No definite associated inflammatory changes within limits of artifact from bilateral hip arthroplasties.   PERITONEUM, RETROPERITONEUM, NODES: The cul-de-sac is obscured. Small amount of free fluid. No definite free intraperitoneal air. No obvious retroperitoneal or mesenteric lymphadenopathy. Again soft tissue density nodularity in the right mid abdomen believed related to decompressed small bowel loops.   VESSELS:  Extensive atherosclerotic calcifications. Aorto biiliac graft. Probable small enhancing retroperitoneal collateral vessels.   PELVIS: Artifact from bilateral hip arthroplasties limits evaluation. The urinary bladder is moderately distended though largely obscured. The uterus, if present, is obscured   ABDOMINAL WALL: Paucity of subcutaneous fat. No sizable abdominal wall hernia. Diffuse soft tissue edema and asymmetric probable dependent edema in the right flank.   BONES: Bilateral hip arthroplasties. Osteopenia. Severe compression deformity at L4 with retropulsion and sclerosis, new from prior. Similar but less prominent findings at T10. Mild superior endplate compression deformities at T12 and L1 similar to the prior exam.   LOWER CHEST: Elevated right diaphragm. Emphysematous  changes. Patchy infiltrates in both lung bases, right-greater-than-left, and moderate bilateral pleural effusions. Coronary artery calcifications and calcifications in the region of the mitral valve.       No definite evidence of abdominal visceral metastatic disease or lymphadenopathy. Limited assessment of the pelvis due to artifact from bilateral hip arthroplasties.   Small amount of nonspecific peritoneal free fluid.   Unusual clustering of decompressed small-bowel loops in the right mid abdomen which could reflect element of malrotation or internal hernia.   Multiple hypodense lesions arising from both kidneys, likely cysts although not fully characterized on single phase exam.   Severe indeterminate age L4 compression deformity with retropulsion and sclerosis and moderate compression deformity at T10.   Emphysematous changes, basilar infiltrates and pleural effusions in the visualized lower chest.   Other findings as described above.   MACRO: None.   Signed by: Etelvina Brown 10/18/2023 9:07 AM Dictation workstation:   [id number]    US gallbladder    Result Date: 10/17/2023  Interpreted By:  Etelvina Brown, STUDY: US GALLBLADDER;  10/17/2023 9:02 am   INDICATION: Signs/Symptoms:gallbladder thickening.   COMPARISON: None.   ACCESSION NUMBER(S): [id number]   ORDERING CLINICIAN: NIA MIGUEL   TECHNIQUE: Multiple images of the right upper quadrant were obtained.   FINDINGS: LIVER:  Normal size and echogenicity. No focal lesion demonstrated.   GALLBLADDER:   Borderline dilated, measuring 5.0 cm transversely. Partially folded upon itself. No gallstone, wall thickening or overlying tenderness.   BILIARY TREE: The common duct measures  7 mm.   PANCREAS:   Partially obscured by overlying bowel gas.  Visualized portions within normal limits.   RIGHT KIDNEY:   Normal in size. No hydronephrosis. 1.6 cm partially septated cyst at the upper pole. 3.4 cm cyst containing a partially thickened septation in the inferior  pole.   Small amount of free fluid in the right upper quadrant. Also small bilateral pleural effusions incidentally noted.       Borderline dilated, otherwise unremarkable appearing gallbladder.   Small amount of free fluid in the right upper quadrant and small bilateral pleural effusions.   2 complex cysts in the right kidney. Consider follow-up to assess stability or further evaluation with multiphase renal CT or MRI as clinically warranted.   MACRO: None.   Signed by: Etelvina Brown 10/17/2023 2:49 PM Dictation workstation:   [id number]    CT angio chest for pulmonary embolism    Result Date: 10/16/2023  Interpreted By:  Eliana York, STUDY: CT ANGIO CHEST FOR PULMONARY EMBOLISM;  10/16/2023 2:11 pm   INDICATION: Shortness of breath, concern for pulmonary embolism.   COMPARISON: CT chest 10/02/2023   ACCESSION NUMBER(S): [id number]   ORDERING CLINICIAN: OTILIO FARNSWORTH   TECHNIQUE: Helical data acquisition of the chest was obtained following intravenous administration of 75 ml of Omnipaque 350. Images were reformatted in coronal and sagittal planes. Axial and coronal MIP images were created and reviewed.   FINDINGS: POTENTIAL LIMITATIONS OF THE STUDY: Right lower lobe consolidation.   HEART AND VESSELS: No discrete filling defects within the main pulmonary artery or its branches within limits of suboptimal right middle/lower lobe distal segmental and subsegmental pulmonary artery evaluation due to above limitation.   Main pulmonary artery and its branches are normal in caliber.   The thoracic aorta is of normal course and caliber with severe vascular calcifications.   Severe 4-vessel coronary artery calcifications are seen.The study is not optimized for evaluation of coronary arteries.   Biatrial enlargement.   No evidence of pericardial effusion.   MEDIASTINUM AND KATIE, LOWER NECK AND AXILLA: The visualized thyroid gland is within normal limits.   Stable right upper paratracheal node, 2.5 x 1.1 cm (series  607, image 83).   Esophagus appears within normal limits as seen.   LUNGS AND AIRWAYS: Progressive narrowing right middle lobe and right lower lobe central airways. New medial left upper lobe and lingular peribronchial wall thickening.   New small right and moderate left pleural effusions.   Background moderate to severe centrilobular emphysema. Increased size of right upper lobe mass, 3.6 x 2.5 cm, previously 2.4 x 2.4 cm. Now without central cavitation. Increased adjacent airspace opacities.   Increased right lower lobe and new left lower lobe airspace opacities. No pneumothorax.   UPPER ABDOMEN: New, gallbladder dilation, partially imaged.   CHEST WALL AND OSSEOUS STRUCTURES: Multiple bilateral breast calcifications redemonstrated. Remote right clavicular head fracture. Unchanged multilevel compression fractures, severe at T7. Small bone island left inferior scapula. No suspicious osseous lesions.       No acute pulmonary embolism within the limitation of suboptimal right middle/lower lobe distal segmental and subsegmental pulmonary artery evaluation due to parenchymal consolidation.   Increased size right upper lobe mass with persistent mediastinal lymphadenopathy. Rapid interval growth suggests infectious/inflammatory etiology, although neoplastic process can not be excluded. Consider further evaluation with PET-CT.   New bilateral pleural effusions with worsening bilateral lower lobe airspace disease.   New gallbladder wall dilation. Suggest further evaluation with right upper quadrant ultrasound.   MACRO None   Signed by: Eliana York 10/16/2023 3:31 PM Dictation workstation:   [id number]    XR chest 1 view    Result Date: 10/16/2023  Interpreted By:  Eliana York, STUDY: XR CHEST 1 VIEW; 10/15/2023 7:39 pm   INDICATION: V/Q scan   COMPARISON: Radiographs 10/13/2023, CT chest 10/02/2023   ACCESSION NUMBER(S): [id number]   ORDERING CLINICIAN: ИРИНА LIM   TECHNIQUE: Single frontal view of the  chest performed.   FINDINGS: LINES AND DEVICES: None.   LUNGS: Emphysema. Previously shown right upper lobe paraspinal cavitary lesion is obscured by mediastinal silhouette and better assessed on the prior CT. Persistent elevation of right hemidiaphragm. Bibasilar atelectasis. Stable bibasilar pleural thickening. No new focal consolidation, pleural effusion or pneumothorax.   CARDIOMEDIASTINAL SILHOUETTE: The cardiomediastinal silhouette is within normal limits accounting for patient rotation.       No focal consolidation.   Right upper lobe paraspinal cavitary lesion is obscured and better assessed on the prior CT.   MACRO None   Signed by: Eliana York 10/16/2023 8:42 AM Dictation workstation:   [id number]    NM lung perfusion particulate    Result Date: 10/15/2023  Interpreted By:  Ryan Lopez, STUDY: NM LUNG PERFUSION PARTICULATE;  10/15/2023 7:41 pm   INDICATION: Signs/Symptoms:elevated VTE exclusion D dimer, hypoxia.   COMPARISON: Chest x-ray from 10/15/2023, CT 10/2/2023   ACCESSION NUMBER(S): [id number]   ORDERING CLINICIAN: OTILIO FARNSWORTH   TECHNIQUE: Multiple perfusion images of the lungs were acquired after the intravenous administration of 4 mCi of Tc-99m macroaggregated albumin (MAA). In addition, SPECT/CT of the chest was performed.   FINDINGS: There is heterogeneous perfusion of the lungs. There is area of decreased perfusion/perfusion defect in the left upper and midlung. There is small pleural effusion and consolidative opacity in the left lower lobe. There is also small right pleural effusion and elevation the right diaphragm corresponding to perfusion defect in the right lower lung. The patient has known cavitary mass in the right upper lobe is better assessed on 10/2/2023 CT chest examination.       Heterogeneous perfusion of the lungs with area of decreased perfusion in the lateral left upper and mid lung which however does not meet the criterion for high probability, and if there is  persistent concern for acute pulmonary embolism, a CT PE chest is recommended for further evaluation. Small pleural effusions and consolidative opacities with consolidation in the left lower lobe compatible with pneumonia. Right pleural effusion and elevation of the right diaphragm corresponds to perfusion defect in the right lower lung. The patient's known cavitary mass in the right upper lobe is better assessed on 10/2/2023 CT chest examination.             Signed by: Ryan Lopez 10/15/2023 7:55 PM Dictation workstation:   [id number]    NM ABS local SPECT    Result Date: 10/15/2023  Interpreted By:  Ryan Lopez, STUDY: NM LUNG PERFUSION PARTICULATE;  10/15/2023 7:41 pm   INDICATION: Signs/Symptoms:elevated VTE exclusion D dimer, hypoxia.   COMPARISON: Chest x-ray from 10/15/2023, CT 10/2/2023   ACCESSION NUMBER(S): [id number]   ORDERING CLINICIAN: OTILIO FARNSWORTH   TECHNIQUE: Multiple perfusion images of the lungs were acquired after the intravenous administration of 4 mCi of Tc-99m macroaggregated albumin (MAA). In addition, SPECT/CT of the chest was performed.   FINDINGS: There is heterogeneous perfusion of the lungs. There is area of decreased perfusion/perfusion defect in the left upper and midlung. There is small pleural effusion and consolidative opacity in the left lower lobe. There is also small right pleural effusion and elevation the right diaphragm corresponding to perfusion defect in the right lower lung. The patient has known cavitary mass in the right upper lobe is better assessed on 10/2/2023 CT chest examination.       Heterogeneous perfusion of the lungs with area of decreased perfusion in the lateral left upper and mid lung which however does not meet the criterion for high probability, and if there is persistent concern for acute pulmonary embolism, a CT PE chest is recommended for further evaluation. Small pleural effusions and consolidative opacities with consolidation in the left lower  lobe compatible with pneumonia. Right pleural effusion and elevation of the right diaphragm corresponds to perfusion defect in the right lower lung. The patient's known cavitary mass in the right upper lobe is better assessed on 10/2/2023 CT chest examination.             Signed by: Ryan Lopez 10/15/2023 7:55 PM Dictation workstation:   [id number]    Transthoracic Echo (TTE) Complete    Result Date: 10/14/2023   [facility], [address] [place] [address]              Tel [phone] and Fax [phone] TRANSTHORACIC ECHOCARDIOGRAM REPORT  Patient Name:     IONA RODRIGUEZ Reading           [id number] [address]ne                                       Physician:        MD Study Date:       10/14/2023          Ordering          [id number] OTILIO FARNSWORTH                                       Provider: MRN/PID:          [id number]            Fellow: Accession#:       [id number]        Nurse: Date of           1940 / 83      Sonographer:      Alivia Bentley VY Birth/Age:        years Gender:           F                   Additional Staff: Height:           157.48 cm           Admit Date:       10/13/2023 Weight:           50.00 kg            Admission Status: Inpatient - Routine BSA:              1.48 m2             Encounter#:       [id number] Blood Pressure: 92 /57 mmHg Study Type:    TRANSTHORACIC ECHO (TTE) COMPLETE Diagnosis/ICD: Non ST elevation (NSTEMI) myocardial infarction-I21.4 Indication:    NSTEMI CPT Code:      Echo Complete w Full Doppler-[id number] Patient History: Pertinent History: COPD, LE Edema and Hx COVID 2021. Study Detail: The following Echo studies were performed: 2D, M-Mode, Doppler and               color flow. Technically challenging study due to body habitus.               Unable to obtain suprasternal notch view.  PHYSICIAN INTERPRETATION: Left Ventricle: The left ventricular systolic function is normal, with an estimated ejection fraction of 60-65%. There are no  regional wall motion abnormalities. The left ventricular cavity size is normal. There is moderate concentric left ventricular hypertrophy. Spectral Doppler shows a normal pattern of left ventricular diastolic filling. Left Atrium: The left atrium is normal in size. Right Ventricle: The right ventricle is normal in size. There is normal right ventricular global systolic function. Right Atrium: The right atrium is upper limits of normal in size. Aortic Valve: The aortic valve appears abnormal. There is evidence of severe aortic valve stenosis. There is no evidence of aortic valve regurgitation. The peak instantaneous gradient of the aortic valve is 65.1 mmHg. The mean gradient of the aortic valve is 41.0 mmHg. Aortic valve: Peak gradient 65.1 mmHg, mean gradient 41 mmHg, DHAVAL 0.7cm2. Mitral Valve: The mitral valve is mildly thickened. There is mild mitral annular calcification. There is no evidence of mitral valve regurgitation. Tricuspid Valve: The tricuspid valve is structurally normal. There is trace tricuspid regurgitation. The Doppler estimated RVSP is moderate to severely elevated at 50.2 mmHg. Pulmonic Valve: The pulmonic valve is not well visualized. There is physiologic pulmonic valve regurgitation. Pericardium: There is no pericardial effusion noted. Aorta: The aortic root is normal. In comparison to the previous echocardiogram(s): Compared with study from 3/23/2022,.  CONCLUSIONS:  1. Left ventricular systolic function is normal with a 60-65% estimated ejection fraction.  2. There is moderate concentric left ventricular hypertrophy.  3. Moderate to severely elevated right ventricular systolic pressure.  4. Aortic valve appears abnormal.  5. Aortic valve: Peak gradient 65.1 mmHg, mean gradient 41 mmHg, DHAVAL 0.7cm2.  6. Severe aortic valve stenosis. QUANTITATIVE DATA SUMMARY: 2D MEASUREMENTS:                           Normal Ranges: LAs:           3.70 cm    (2.7-4.0cm) IVSd:          1.65 cm    (0.6-1.1cm)  LVPWd:         1.59 cm    (0.6-1.1cm) LVIDd:         3.81 cm    (3.9-5.9cm) LVIDs:         2.78 cm LV Mass Index: 166.1 g/m2 LV % FS        27.2 % LA VOLUME:                               Normal Ranges: LA Vol A4C:        24.2 ml    (22+/-6mL/m2) LA Vol A2C:        96.6 ml LA Vol Index A4C:  16.3ml/m2 LA Vol Index A2C:  65.1 ml/m2 LA Area A4C:       11.7 cm2 LA Major Axis A4C: 4.8 cm LA Vol A4C:        22.5 ml LA Vol A2C:        91.9 ml M-MODE MEASUREMENTS:              Normal Ranges: LAs: 4.79 cm (2.7-4.0cm) AORTA MEASUREMENTS:                      Normal Ranges: Ao Sinus, d: 2.70 cm (2.1-3.5cm) Ao STJ, d:   2.80 cm (1.7-3.4cm) Asc Ao, d:   2.90 cm (2.1-3.4cm) LV SYSTOLIC FUNCTION BY 2D PLANIMETRY (MOD):                     Normal Ranges: EF-A4C View: 73.6 % (>=55%) EF-A2C View: 60.0 % EF-Biplane:  67.5 % LV DIASTOLIC FUNCTION:                               Normal Ranges: MV Peak E:        1.10 m/s    (0.7-1.2 m/s) MV Peak A:        1.00 m/s    (0.42-0.7 m/s) E/A Ratio:        1.10        (1.0-2.2) MV lateral e'     0.09 m/s MV medial e'      0.04 m/s MV A Dur:         103.81 msec PulmV Sys Neftali:    27.15 cm/s PulmV Walker Neftali:   34.42 cm/s PulmV S/D Neftali:    0.79 PulmV A Revs Neftali: 24.50 cm/s PulmV A Revs Dur: 129.18 msec MITRAL VALVE:                      Normal Ranges: MV Vmax:    1.18 m/s (<=1.3m/s) MV peak P.6 mmHg (<5mmHg) MV mean P.5 mmHg (<2mmHg) MV VTI:     31.66 cm (10-13cm) MV DT:      219 msec (150-240msec) AORTIC VALVE:                                    Normal Ranges: AoV Vmax:                4.03 m/s  (<=1.7m/s) AoV Peak P.1 mmHg (<20mmHg) AoV Mean P.0 mmHg (1.7-11.5mmHg) LVOT Max Neftali:            0.89 m/s  (<=1.1m/s) AoV VTI:                 96.67 cm  (18-25cm) LVOT VTI:                20.17 cm LVOT Diameter:           1.93 cm   (1.8-2.4cm) AoV Area, VTI:           0.61 cm2  (2.5-5.5cm2) AoV Area,Vmax:           0.65 cm2  (2.5-4.5cm2) AoV Dimensionless Index: 0.21   RIGHT VENTRICLE: RV Basal 3.20 cm RV Mid   2.70 cm RV Major 6.9 cm TAPSE:   24.0 mm TRICUSPID VALVE/RVSP:                             Normal Ranges: Peak TR Velocity: 3.25 m/s Est. RA Pressure: 8 mmHg RV Syst Pressure: 50.2 mmHg (< 30mmHg) IVC Diam:         2.20 cm PULMONIC VALVE:                         Normal Ranges: PV Accel Time: 58 msec  (>120ms) PV Max Neftali:    1.2 m/s  (0.6-0.9m/s) PV Max P.1 mmHg Pulmonary Veins: PulmV A Revs Dur: 129.18 msec PulmV A Revs Neftali: 24.50 cm/s PulmV Walker Neftali:   34.42 cm/s PulmV S/D Neftali:    0.79 PulmV Sys Neftali:    27.15 cm/s  [id number] Marlon Byrne MD Electronically signed on 10/14/2023 at 12:47:38 PM  ** Final **     CT head wo IV contrast    Result Date: 10/14/2023  Interpreted By:  Chris Ortez, STUDY: CT HEAD WO IV CONTRAST;  10/14/2023 4:13 am   INDICATION: confusion r/out bleed.   COMPARISON: Head CT dated 2022.   ACCESSION NUMBER(S): [id number]   ORDERING CLINICIAN: BEL ALVA   TECHNIQUE: Axial noncontrast CT images of the head.   FINDINGS: Gray-white matter differentiation is maintained. There are no extra-axial collections. No mass effect or midline shift. There is no hydrocephalus. There is generalized cerebral volume loss and associated ventricular and sulcal enlargement. There are confluent periventricular and deep white matter hypodensities suggestive of moderate to severe chronic microvascular ischemic change.   HEMORRHAGE: No acute intracranial hemorrhage.   CALVARIUM: No depressed skull fracture.   EXTRACRANIAL SOFT TISSUES:.. Unremarkable.   PARANASAL SINUSES/MASTOIDS: The visualized paranasal sinuses are well aerated. Mastoid air cells are clear.   ORBITS: Bilateral cataract surgery.       No evidence of acute cortical infarct or acute intracranial hemorrhage. Moderate to advanced chronic microvascular ischemic changes.     Signed by: Chris Ortez 10/14/2023 4:41 AM Dictation workstation:   [id number]    XR chest 1 view    Result  Date: 10/13/2023  Interpreted By:  Ryan Lopez, STUDY: XR CHEST 1 VIEW;  10/13/2023 7:47 pm   INDICATION: Signs/Symptoms:pneumonia.   COMPARISON: CXR 10/6/2023, CT 10/2/2023   ACCESSION NUMBER(S): [id number]   ORDERING CLINICIAN: BEL ALVA   FINDINGS: There is cardiomegaly. Atherosclerotic calcification of the thoracic aorta. The patient has known cavitary mass in the right upper lobe is not well assessed on the chest radiograph. There is stable asymmetric elevation right diaphragm and right basilar atelectatic or consolidative opacity. There is also mild left basilar atelectasis. No pneumothorax.       Cavitary mass in the right upper lobe is not well assessed on the plain radiograph and better assessed on the recent 10/2/2023 CT chest examination.   Stable elevation of the right diaphragm and right basilar atelectasis or airspace disease. Left basilar subsegmental atelectasis.     MACRO: None   Signed by: Ryan Lopez 10/13/2023 8:02 PM Dictation workstation:   [id number]    FL modified barium swallow study    Result Date: 10/6/2023  Interpreted By:  Matilda Evangelista and Gennert Zara STUDY: FL MODIFIED BARIUM SWALLOW STUDY;; 10/6/2023 11:12 am   INDICATION: Signs/Symptoms:aspiration pneumonia.   COMPARISON: None.   ACCESSION NUMBER(S): [id number]   ORDERING CLINICIAN: MOLLY ADAMES   TECHNIQUE: MBSS completed. Informed verbal consent obtained prior to completion of exam. Trials of thin, nectar thick, honey thick, puree, and regular solids given. Fluoroscopy time :  2 minutes 27 seconds. 887 video fluoroscopic images were obtained.   SLP: Marcia Arrington   SPEECH FINDINGS: Reason for referral: Dysphagia Patient hx: Pt reports recent coughing d/t respiratory concerns. Pt reports h/o completing MBS approx 7-8 years ago. Respiratory status: Nasal cannula Previous diet: Cardiac   FINAL SPEECH RECOMMENDATIONS   Diet recommendations/feeding strategies: Regular solids, Honey/Moderately-thick liquids. Sit upright  to eat and remain upright for 30-60 minutes. Small bites/sips. Alternate solids/liquids. No Straws.   Follow-up speech therapy recommended: Yes.   Education provided: Yes. Reviewed initial results of today's study.   Repeat study/ dc plan: After course of ST/Swallow tx.   Mechanics of the swallow summary: *Oral phase: Anatomy appears WFL. Loss of bolus into pharynx prior to swallow with solids and all liquids. *Pharyngeal phase: Anatomy appears WFL. Loss of bolus to valleculae prior to swallow with solids and all liquids; loss to pyriform sinuses with moderately/honey-thick, mildly/nectar-thick liquids. Residue noted at valleculae and posterior pharyngeal wall (level of C3-C4) with all consistencies. Laryngeal penetration with mildly/nectar-thick liquids and thin liquids by cup, with trace residue that silently dripped to TVF to become aspirated. Brendon silent laryngeal aspiration noted with thin liquids by straw. *Esophageal phase: Adequate UES opening with appropriate clearance of bolus trials. Mild residue noted inferior to UES at level of C5-C6.   SLP impressions with severity rating: Pt presents with severe pharyngeal dysphagia, silent aspiration with thin and nectar/mildly thick liquids.   Thin Liquids (MBSS) Rosenbek's Penetration Aspiration Scale, Thin Liquids (MBSS): 8. SILENT ASPIRATION - contrast passes glottis, visible residue, NO immediate pt response 20ml by cup, 60ml by straw Response to Aspiration, Thin Liquid (MBSS): absent response, silent aspiration, productive volitional cough following clinician cue, productive reflexive involuntary cough, Delayed reaction to visualized aspiration of 5+ seconds Response to Pharyngeal Residue, Thin Liquid (MBSS): spontaneous clearing, unable to clear with multiple swallows,   Nectar Thick Liquids (MBSS) Rosenbek's Penetration Aspiration Scale, Nectar thick liquids (MBSS): 5. DEEP PENETRATION with HIGH ASPIRATION risk - contrast contacts vocal cords, visible  residue 20ml by cup, 60ml by straw Response to Aspiration, Nectar thick liquids (MBSS): absent response, silent aspiration, Response to Pharyngeal Residue, Nectar thick liquids (MBSS): spontaneous clearing, unable to clear with multiple swallows,   Honey Thick Liquids (MBSS) Rosenbek's Penetration Aspiration Scale, Honey thick liquids (MBSS): 1. NO ASPIRATION & NO PENETRATION - no aspiration, contrast does not enter airway 20ml by cup Response to Pharyngeal Residue, Honey thick liquids (MBSS): spontaneous clearing,   Purees (MBSS) Rosenbek's Penetration Aspiration Scale, Purees (MBSS): 1. NO ASPIRATION & NO PENETRATION - no aspiration, contrast does not enter airway 3x 5ml by spoon Response to Pharyngeal Residue, Purees (MBSS): spontaneous clearing,   Solids (MBSS) Rosenbek's Penetration Aspiration Scale, Solids (MBSS): 1. NO ASPIRATION & NO PENETRATION - no aspiration, contrast does not enter airway 2x 1/8th piece raoul cracker coated in barium, Barium tablet x1 with thin liquid wash Response to Pharyngeal Residue, Solids (MBSS): spontaneous clearing,   Speech Therapy section of this report signed by BIN Venegas-SLP on 10/6/2023 at 11:20 am.   RADIOLOGY FINDINGS: There was aspiration with thin liquids. This was reported to be silent aspiration. Additional observations and recommendations will be provided by the speech pathologist.   There is a minimal spondylolisthesis at C4-C5. There is disc space narrowing C3-C7.   There is surgical clips in the neck consistent with previous surgery.   Radiology section of this report signed by Matilda Evangelista.       Aspiration with thin liquids.   MACRO: None   Signed by: Matilda Evangelista 10/6/2023 11:53 AM Dictation workstation:   [id number]    XR chest 1 view    Result Date: 10/6/2023  Interpreted By:  Travis Calderon, STUDY: XR CHEST 1 VIEW;  10/6/2023 9:23 am   INDICATION: Signs/Symptoms:evaluate for pnum,othorax post lung biopsy.   COMPARISON: Most recent prior chest  x-rays from 10/05/2023. CT scan chest from 10/02/2023. Images from CT-guided biopsy posterior right upper lobe cavitary mass dated 10/05/2023..   ACCESSION NUMBER(S): [id number]   ORDERING CLINICIAN: INGE BUTTS   TECHNIQUE: Single AP portable view of the chest was obtained.   FINDINGS: MEDIASTINUM/ LUNGS/ KATIE: Cardiomegaly without gross vascular congestion. No pleural effusion on the left. Elevation of the right diaphragm. Mild persistent pleural and parenchymal opacities at the right base. Stable calcification in the aorta. Cavitary mass in the posterior paraspinal mid right upper lobe on CT scan is not readily apparent on chest x-ray. No pneumothorax. No tracheal deviation. No abnormal hilar fullness or gross mass on either side. Stable surgical clips in the lower left neck.   BONES: No lytic or blastic destructive bone lesion. Moderate right glenohumeral joint space narrowing with spur formation.  There is mild disc space narrowing and endplate osteophytosis throughout the thoracic spine.   UPPER ABDOMEN: Grossly intact.         Mid posteromedial right upper lobe cavitary mass on CT scan dated 10/02/2023 is not readily apparent on plain films. Patient had percutaneous needle biopsy under CT guidance of this mass yesterday. . No pneumothorax.   Stable underlying elevation of the right diaphragm.   Persistent pleural and parenchymal opacities at the right base could be pleural and parenchymal scarring. A component pleural fluid with pneumonia is not excluded.   Cardiomegaly without indication of CHF.   Signed by: Travis Calderon 10/6/2023 9:38 AM Dictation workstation:   [id number]    CT guided percutaneous biopsy lung    Result Date: 10/6/2023  Interpreted By:  Marcell Willingham, STUDY: CT GUIDED PERCUTANEOUS BIOPSY LUNG;  10/5/2023 2:32 pm   INDICATION: Signs/Symptoms:CT biopsy RUL nodule.   COMPARISON: None.   ACCESSION NUMBER(S): [id number]   ORDERING CLINICIAN: ANITHA TREVINO   TECHNIQUE: INTERVENTIONALIST(S):  Marcell Willingham MD   CONSENT: The patient/patient's POA/next of kin was informed of the nature of the proposed procedure. The purposes, alternatives, risks, and benefits were explained and discussed. All questions were answered and consent was obtained.   RADIATION EXPOSURE: Total DLP: 259 mGy*cm     SEDATION: Moderate conscious IV sedation services (supervision of administration, induction, and maintenance) were provided by the physician performing the procedure with intravenous fentanyl  and versed  for 20 minutes. The physician was assisted by an independent trained observer, an interventional radiology nurse, in the continuous monitoring of patient level of consciousness and physiologic status.   MEDICATION/CONTRAST: No additional   TIME OUT: A time out was performed immediately prior to procedure start with the interventional team, correctly identifying the patient name, date of birth, MRN, procedure, anatomy (including marking of site and side), patient position, procedure consent form, relevant laboratory and imaging test results, antibiotic administration, safety precautions, and procedure-specific equipment needs.   COMPLICATIONS: No immediate adverse events identified.   FINDINGS: The patient was placed in the right decubitus position on the CT table. A planning CT was performed and demonstrated the target right upper lobe mass. A trajectory was planned and the site was marked. The area was prepped and draped in the usual sterile fashion. Lidocaine was used for local anesthesia. Under CT guidance, a 17 gauge introducer needle was advanced into the target nodule and the stylet was removed. 5 passes were made using an 18 gauge cutting needle yielding fragmented core samples, which were placed in formalin. A BioSentry device was deployed through the introducer needle and the introducer needle was removed. Hemostasis was obtained with manual compression. A postprocedure limited CT demonstrated minimal  hemorrhage in the area of biopsy without pneumothorax. A sterile dressing was applied. The patient tolerated the procedure well and there were no immediate complications.       Successful CT-guided core biopsy of the target right upper lobe lung nodule.   I was present for and/or performed the critical portions of the procedure and immediately available throughout the entire procedure.   I personally reviewed the image(s)/study and interpretation. I agree with the findings as stated.   Performed and dictated at [facility].   Signed by: Marcell Willingham 10/6/2023 8:25 AM Dictation workstation:   [id number]    XR chest 1 view    Result Date: 10/5/2023  Interpreted By:  Travis Calderon, STUDY: XR CHEST 1 VIEW;  10/5/2023 4:16 pm   INDICATION: Signs/Symptoms:s/p RUL lung biopsy.   COMPARISON: Chest CT scan from 10/02/2023. Chest x-rays, most recent from 10/01/2023.   ACCESSION NUMBER(S): [id number]   ORDERING CLINICIAN: MARCELL WILLINGHAM   TECHNIQUE: Single AP portable view of the chest was obtained.   FINDINGS: MEDIASTINUM/ LUNGS/ KATIE: Cardiomegaly without gross vascular congestion. No pleural effusion on the left. Elevation of the right diaphragm. Mild persistent pleural and parenchymal opacities at the right base. Stable calcification in the aorta. Cavitary mass in the posterior paraspinal mid right upper lobe on CT scan is not readily apparent on chest x-ray. No pneumothorax. No tracheal deviation. No abnormal hilar fullness or gross mass on either side. Stable surgical clips in the lower left neck.   BONES: No lytic or blastic destructive bone lesion. Moderate right glenohumeral joint space narrowing with spur formation.  There is mild disc space narrowing and endplate osteophytosis throughout the thoracic spine.   UPPER ABDOMEN: Grossly intact.       Mid posteromedial right upper lobe cavitary mass on CT scan dated 10/02/2023 is not readily apparent on plain films. Patient had  percutaneous needle biopsy under CT guidance of this mass earlier this afternoon. No pneumothorax.   Stable underlying elevation of the right diaphragm.   Persistent pleural and parenchymal opacities at the right base could be pleural and parenchymal scarring. A component pleural fluid with pneumonia is not excluded.   Cardiomegaly without indication of CHF.   Signed by: Travis Calderon 10/5/2023 4:39 PM Dictation workstation:   [id number]    CT chest w IV contrast    Result Date: 10/2/2023  Interpreted By:  Travis Calderon, STUDY: CT CHEST W IV CONTRAST;  10/2/2023 4:25 pm   INDICATION: 83 y/o   F with  Signs/Symptoms:rul mass.   LIMITATIONS: None.   ACCESSION NUMBER(S): [id number]   ORDERING CLINICIAN: MOLLY ADAMES   TECHNIQUE: After the administration of IV nonionic contrast, thin-section images were obtained  from the thoracic inlet down through the diaphragm. Sagittal and coronal reconstruction images were generated. Mediastinal, lung, bone, and liver windows were reviewed.   IV contrast agent was Omnipaque 350, 75 mL   COMPARISON: CT scan chest from 02/04/2022 and CT scan chest from 04/20/2022. correlation with chest x-ray from 10/01/2023.   FINDINGS: CHEST WALL/BASE OF THE NECK: The chest wall was grossly intact. No thyromegaly or thyroid mass.   LUNGS/ PLEURA/ AND TRACHEA: Underlying emphysematous changes in the lungs. There is mild persistent infiltrative density with some associated bronchial wall thickening but also some volume loss indicating complicating atelectasis, in the right lower lobe. The findings are improved from the previous exams. There is mild new atelectasis in the right middle lobe anteromedially. There is a lobulated mass abutting against the paraspinal pleural surface in the posteromedial right upper lobe, with a small central cavitation. This mass measures 24 x 24 mm today, increased from 12 mm maximal diameter on 04/20/2022. Punctate pleural based nodule in the mid anterolateral left  upper lobe on image 116, unchanged. Small new area linear density in the anteromedial left upper lobe centered on axial image 166. Small stable area parenchymal density in the paraspinal left lower lobe centered on image 237. No pleural effusion. No pneumothorax. The trachea was grossly intact.   MEDIASTINUM/KATIE: Mild but suspicious central mediastinal adenopathy measuring up to 11 x 24 mm on axial image 17. No suspicious left hilar adenopathy. There is some ill-defined soft tissue in the lower aspect of the right hilum related to the chronic atelectasis/pneumonia in the right lower lobe described above. No other adenopathy in the right hilum. No axillary adenopathy. Mild cardiomegaly. Advanced four-vessel coronary artery calcifications. No pericardial effusion. Advanced mural calcifications throughout the thoracic aorta. No thoracic aortic aneurysm or dissection. No central pulmonary artery filling defect.   BONES: No destructive lytic or blastic bone lesion. Mild accentuation of distal thoracic kyphosis. Scattered mild endplate osteophytosis. Mild stable loss of height at T3. Prominent stable compression fracture at T7. Moderate stable compression fracture at T10. Stable superior endplate Schmorl's node at T11 with slight stable anterior wedging at T11. Moderate compression fracture with anterior wedging at T12, unchanged. Mild stable superior endplate compression fracture with anterior wedging at L1. The sternum is intact.   UPPER ABDOMEN: Small hiatal hernia. Multiple bilateral renal cysts. The largest on the right measures 32 x 22 mm and the largest on the left measures 47 x 24 mm. Advanced mural calcifications throughout the proximal abdominal aorta. There are diffuse bilateral renal artery calcifications, diffuse splenic artery calcifications, and mild moderate hepatic artery and SMA calcifications. Otherwise, the imaged upper abdomen was grossly intact.       Underlying emphysema. Small area of stable  pleural and parenchymal density in the paraspinal left lower lobe, most likely scarring. Mild new atelectasis in the anteromedial right middle lobe and new area atelectasis or scarring in the mid anteromedial left upper lobe.   Persistent infiltrative densities in the mid to lower right lower lobe, most likely scarring and atelectasis. Chronic residual component of previous pneumonia is not excluded.   Increase in size of suspicious cavitary nodule in the paraspinal right upper lobe. Cavitary primary lung carcinoma (squamous cell carcinoma) to be excluded. Metastatic lesion from an unknown primary would be a differential consideration. Cavitary infection is considered less likely.   Mild but suspicious central mediastinal adenopathy. Consider PET-CT scan in follow-up.   Cardiomegaly. Advanced four-vessel coronary artery calcifications.   Advanced mural calcifications throughout the thoracic aorta and imaged abdominal aorta. No thoracic or abdominal aortic aneurysm in this exam. There are additional calcifications in the abdominal vessels as described.   Small hiatal hernia.   Bilateral renal cysts.   Multiple stable spinal compression fractures as described.   MACRO: None   Signed by: Travis Calderon 10/2/2023 4:52 PM Dictation workstation:   [id number]    CT soft tissue neck w IV contrast    Result Date: 10/2/2023  Interpreted By:  Jaron Carlson, STUDY: CT SOFT TISSUE NECK W IV CONTRAST;  10/2/2023 2:09 am   INDICATION: Signs/Symptoms:narrowing of trachea difficulty swallowing.   COMPARISON: None.   ACCESSION NUMBER(S): [id number]   ORDERING CLINICIAN: MOLLY ADAMES   TECHNIQUE: Following intravenous injection  axial CT was performed from the skullbase to the thoracic inlet and multiplanar reconstructions were made.   FINDINGS: *  The visualized paranasal sinuses nasopharynx and oropharynx are unremarkable. *The mandible, maxilla and temporomandibular joints are normal. *The major salivary glands are normal.  *There is no measurable cervical lymphadenopathy. *The larynx and related cartilages are normal. *The thyroid gland is normal. *The thoracic inlet is normal. *The airway and visualized esophagus are normal *There are abundant calcifications of the thoracic aorta and the great vessels. There is marked dilatation to a proximally 2 cm maximum diameter, at the left carotid bifurcation with peripheral calcification. There is no evidence of intraluminal thrombus *There is a 2 cm mass with central necrosis or emphysematous bulla at the right pulmonary apex that can not be further characterized. This nodule has increased in size compared to the previous examination dated April 2022 neoplasm not excluded.       * There is no evidence of mass, cyst or adenopathy in the neck. 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 Notification was initiated on 10/2/2023 at 7:19 am by  Jaron Carlson. (**-OCF-**) Instructions:     [place]AM[place] [facility]   Signed by: Jaron Carlson 10/2/2023 7:21 AM Dictation workstation:   [id number]    XR chest 2 views    Result Date: 10/1/2023  Interpreted By:  Matilda Evangelista, STUDY: XR CHEST 2 VIEWS;  10/1/2023 1:05 pm   INDICATION: Signs/Symptoms:cough + wheezing r/o underlying infiltrate.   COMPARISON: 10/25/2022   ACCESSION NUMBER(S): [id number]   ORDERING CLINICIAN: MAXIMUS ARRIAGA   FINDINGS: The cardiac silhouette is prominent. There are atherosclerotic changes of the aorta.   The lungs are hyperinflated. There is interstitial lung disease. There is more confluent density at the right base.   There is no definite pleural fluid.   There are degenerative changes of the spine. There are compression fractures. The bones are osteoporotic.   There is surgical clips in the neck on the left.   COMPARISON OF FINDING: The right lung is improved in appearance.       Prominent cardiac  silhouette.   COPD.   Right basilar infiltrate.   Signed by: Matilda Evangelista 10/1/2023 1:08 PM Dictation workstation:   [id number]          Assessment/Plan   Norah Cleaning is a 83 y.o. female on day 7 of admission presenting with Acute pneumonia.  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)    Aortic stenosis, severe    Non-ischemic myocardial injury (non-traumatic)    Pulmonary HTN (CMS/HCC)    Acute diastolic HF (heart failure) (CMS/HCC)    Compression fracture of L4 lumbar vertebra, sequela    Plan:  Concern for lung malignancy and work-up is in progress  Currently on antibiotics along with breathing treatment for pneumonia  Cardiology consult is noted, holding lasix and losartan for now due to hypotension  The recommended transitioning to oral diuretics on 10/20/2021  Discharge planning   submitted for prior authorization for skilled nursing facility  Patient on her baseline O2 requirement  Patient does not appear to have ACS  MRI brain negative  Follow-up with pulmonary services outpatient  Continue with aspirin, atorvastatin  Can continue with Lovenox  On Levaquin IV  Losartan is on hold  Continue with metoprolol  Continue with sildenafil/Revatio for pulmonary hypertension     Plan discussed with patient at bedside  Disposition: Discharged to skilled nursing facility once  insurance precertification has been obtained  High level of MDM based on above issue and discussing plan    This note is created using voice recognition software. All efforts are made to minimize errors, if there are errors there due to transcription.    Annie Caruso  Hospitalist

## 2023-10-21 NOTE — PROGRESS NOTES
Norah Cleaning is a 83 y.o. female on day 6 of admission presenting with Acute pneumonia.    83 y.o. female with a h/o COPD with chronic hypoxic respiratory failure on 3L NC, recently diagnosed cavitary lung mass, AAA, anxiety, CHF, DLP, GERD, who p/w SOB. Work up in the ED revealed mild leukocytosis, mildly elevated troponin, RUL cavitary mass. Started on antibiotics and admitted to [facility] for further management. Pulmonary is consulted for lung mass and GGO on CT.      Subjective   Low BP today, no other events.     SOB improved. Still has some cough productive of yellow/green sputum. Denies wheezing. No LH. Had CP earlier today that lasted for one hours.     Objective     Physical Exam  Constitutional:       Appearance: Normal appearance.      Comments: Thin.    HENT:      Head: Normocephalic and atraumatic.      Comments: R facial swelling.      Nose:      Comments: On NC.      Mouth/Throat:      Mouth: Mucous membranes are moist.   Eyes:      General: No scleral icterus.     Extraocular Movements: Extraocular movements intact.      Pupils: Pupils are equal, round, and reactive to light.   Cardiovascular:      Rate and Rhythm: Normal rate and regular rhythm.      Heart sounds: Normal heart sounds. No murmur heard.     No friction rub. No gallop.   Pulmonary:      Effort: No respiratory distress.      Breath sounds: Normal breath sounds. No wheezing or rales.      Comments: Clear lung fields anteriorly with fair air entry.   Abdominal:      General: There is no distension.      Palpations: Abdomen is soft.      Tenderness: There is no abdominal tenderness.   Musculoskeletal:         General: No tenderness.      Cervical back: Neck supple. No rigidity.      Right lower leg: No edema.      Left lower leg: No edema.   Skin:     General: Skin is warm and dry.      Coloration: Skin is not jaundiced.   Neurological:      General: No focal deficit present.      Mental Status: She is alert and oriented to person, place,  "and time.      Cranial Nerves: No cranial nerve deficit.      Motor: No weakness.   Psychiatric:         Mood and Affect: Mood normal.         Behavior: Behavior normal.     Last Recorded Vitals  Blood pressure 96/56, pulse 71, temperature 36.7 °C (98.1 °F), resp. rate 18, height 1.6 m (5' 2.99\"), weight 50.1 kg (110 lb 7.2 oz), SpO2 97 %.  Intake/Output last 3 Shifts:  No intake/output data recorded.    Scheduled medications  acetaminophen, 650 mg, oral, TID  albuterol, 2.5 mg, nebulization, TID  aspirin, 81 mg, oral, Daily  atorvastatin, 40 mg, oral, Nightly  budesonide, 0.5 mg, nebulization, BID  cholecalciferol, 50 mcg, oral, BID  DULoxetine, 30 mg, oral, Daily  enoxaparin, 40 mg, subcutaneous, Daily  [Held by provider] ferrous sulfate, 65 mg of iron, oral, Daily  formoterol, 20 mcg, nebulization, BID  gabapentin, 200 mg, oral, TID  guaiFENesin, 600 mg, oral, BID  ketotifen, 1 drop, Both Eyes, BID  lactobacillus acidophilus, 1 capsule, oral, Daily  levoFLOXacin, 750 mg, intravenous, q48h  melatonin, 4.5 mg, oral, Nightly  metoprolol succinate XL, 12.5 mg, oral, Daily  montelukast, 10 mg, oral, Daily  pantoprazole, 40 mg, oral, Daily before breakfast  polyethylene glycol, 17 g, oral, BID  sennosides, 1 tablet, oral, BID  sildenafil, 20 mg, oral, TID      Continuous medications  [Held by provider] sodium chloride 0.9%, 125 mL/hr, Last Rate: 125 mL/hr (10/18/23 0032)      PRN medications  PRN medications: acetaminophen, acetaminophen, acetaminophen, albuterol, benzonatate, bisacodyl, guaiFENesin, ondansetron **OR** ondansetron, oxyCODONE-acetaminophen, oxygen     Relevant Results  Results for orders placed or performed during the hospital encounter of 10/13/23 (from the past 24 hour(s))   Basic Metabolic Panel   Result Value Ref Range    Glucose 69 (L) 74 - 99 mg/dL    Sodium 139 136 - 145 mmol/L    Potassium 3.5 3.5 - 5.3 mmol/L    Chloride 96 (L) 98 - 107 mmol/L    Bicarbonate 38 (H) 21 - 32 mmol/L    Anion Gap 9 " (L) 10 - 20 mmol/L    Urea Nitrogen 8 6 - 23 mg/dL    Creatinine 1.00 0.50 - 1.05 mg/dL    eGFR 56 (L) >60 mL/min/1.73m*2    Calcium 8.5 (L) 8.6 - 10.3 mg/dL   Magnesium   Result Value Ref Range    Magnesium 1.60 1.60 - 2.40 mg/dL   Lavender Top   Result Value Ref Range    Extra Tube Hold for add-ons.      MR brain w and wo IV contrast    Result Date: 10/20/2023  Interpreted By:  Jaron Carlson, STUDY: MR BRAIN W AND WO IV CONTRAST;  10/20/2023 2:29 pm   INDICATION: Signs/Symptoms:rule out brain mets.   COMPARISON: None.   ACCESSION NUMBER(S): [id number]   ORDERING CLINICIAN: JOSE MELISSA   TECHNIQUE: The brain was studied in the sagittal axial and coronal planes utilizing FLAIR, T1 and T2 weighted images   Following intravenous injection of gadolinium contrast, T1 weighted fat suppressed multiplanar images were also performed.   FINDINGS: There is slight prominence of the cortical sulci and sylvian fissures. There is mild ventricular dilatation.  There are confluence and scattered foci of abnormal signal within the periventricular white matter bilaterally.  These are compatible with minimal small vessel ischemic changes.  These nonspecific findings could also be produced by a demyelinating or post inflammatory process.  The visualized skull base paranasal sinuses and orbital structures are unremarkable. Diffusion weighted images and associated ADC maps of the brain were unremarkable.  There is no evidence of diffusion restriction to suggest the presence of acute infarction. Gradient echo T2 weighted images fail to demonstrate hemosiderin deposition or other evidence of hemorrhage. Following intravenous injection of there is no abnormal enhancement. Incidental note is made of a punctate developmental venous anomaly in the right occipital lobe best appreciated on axial 205/340. There is normal contrast opacification of the dural venous sinuses.   IMPRESSION * There is no evidence of mass, cerebral infarction or  hemorrhage. *Developmental venous anomaly in the right occipital lobe.   THIS EXAMINATION WAS INTERPRETED AT [facility]   Signed by: Jaron Carlson 10/20/2023 3:15 PM Dictation workstation:   [id number]    XR chest 1 view    Result Date: 10/19/2023  Interpreted By:  Luci Taylor, STUDY: XR CHEST 1 VIEW;  10/19/2023 10:09 am   INDICATION: Signs/Symptoms:COUGH, SOB.   COMPARISON: 10/15/2023   ACCESSION NUMBER(S): [id number]   ORDERING CLINICIAN: NIA MIGUEL   FINDINGS: Coarsened interstitial lung markings are again seen. Elevation of the right hemidiaphragm is again noted. Surgical clips are noted in the neck.   Heart is mildly enlarged but is unchanged. Round density seen in the right upper lobe in the paratracheal region, which most likely is related to patient's known mass that was seen on the CT of 10/16/2023       Mild cardiomegaly.   Chronic lung changes.   Round density in the right upper lobe which corresponds to a mass was seen on the recent CT scan.       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 Work up in the ED revealed mild leukocytosis, mildly elevated troponin, RUL cavitary mass. Started on antibiotics and admitted to [facility] for further management. Pulmonary is consulted for lung mass and GGO on CT.      RUL mass: enlarging overtime, present  on CT from 4/2022, but has grown significantly since then, given above, likely malignant. Per patient she had biopsy done a week ago at [facility]. Resulted PD-L1 NSCLC.      MRI brain today negative for mets     FU with pulmonary within a week, will need bronch + EBUS biopsy vs PET/CT as outpatient for staging     FU with hematology as outpatient      COPD: per patient was diagnosed 15 years ago, does not seem to be in acute exacerbation. No PFT in our system. On Advair, Spiriva, Montelukast and albuterol as home     Continue albuterol, would change to Duo-Neb     Continue Formoterol, Budesonide and Montelukast     PFT as outpatient     FU with pulmonary after DC     Possible pneumonia: given rapid increase in size of the RUL opacity, concerning for post obstructive pneumonia.      Continue Levaquin (as per primary team, would benefit from switching to Zosyn + Vancomycin if not improvement)     Sputum culture     Pulmonary HTN: likely group 2 and 3. Last echo normal RV and RVSF, RVSP 50.     Continue outpatient Revatio     Continue supplemental O2     CHF management as per cardiology and primary team.     Respiratory failure: with hypoxia, on 3.5L at home. O2 requirements close to baseline.      Continue Supplemental O2, wean off as tolerates       GERD:      Continue Protonix     DVT prophylaxis with Lovenox.      Pulmonary will follow up while in house.      Reena Harley MD

## 2023-10-21 NOTE — PROGRESS NOTES
"Subjective Data:  Feels at her baseline   She was asymptomatic when hypotensive  yesterday. Did receive 250ml IV fluids yesterday.   BP improved today     Not on telemetry     Objective Data:  Last Recorded Vitals:  Vitals:    10/21/23 0342 10/21/23 0816 10/21/23 1208 10/21/23 1315   BP: 115/52 108/62 (!) 85/45 122/56   Pulse: 78 83 64 67   Resp: 18 18 18    Temp: 36.7 °C (98 °F) 36.8 °C (98.2 °F) 36.9 °C (98.5 °F)    TempSrc:       SpO2: 95% 96% 94%    Weight:       Height:           Last Labs:  LABS:  CMP:  Results from last 7 days   Lab Units 10/21/23  0650 10/20/23  0602 10/19/23  0641 10/18/23  0539 10/17/23  0607 10/16/23  0758 10/15/23  0623   SODIUM mmol/L 137 139 140 137 136 136 138   POTASSIUM mmol/L 3.3* 3.5 3.6 3.5 4.0 4.0 3.4*   CHLORIDE mmol/L 93* 96* 97* 99 102 104 104   CO2 mmol/L 35* 38* 33* 32 26 29 28   ANION GAP mmol/L 12 9* 14 10 12 7* 9*   BUN mg/dL 7 8 6 6 7 7 9   CREATININE mg/dL 0.84 1.00 0.83 0.80 0.83 0.74 0.85   EGFR mL/min/1.73m*2 69 56* 70 74 70 81 69   MAGNESIUM mg/dL 1.50* 1.60 1.80 1.50*  --  1.60  --      CBC:  Results from last 7 days   Lab Units 10/21/23  0650 10/17/23  0607 10/16/23  0758   WBC AUTO x10*3/uL 6.5 11.3 8.2   HEMOGLOBIN g/dL 8.9* 8.9* 7.8*   HEMATOCRIT % 29.1* 30.2* 25.8*   PLATELETS AUTO x10*3/uL 248 333 268   MCV fL 97 99 98     COAG:     ABO: No results found for: \"ABO\"  HEME/ENDO:  Results from last 7 days   Lab Units 10/16/23  1442 10/16/23  1441   FERRITIN ng/mL 148  --    IRON SATURATION %  --  11*      CARDIAC:   Results from last 7 days   Lab Units 10/21/23  0650   BNP pg/mL 292*      Last I/O:  I/O last 3 completed shifts:  In: - (0 mL/kg)   Out: 600 (12 mL/kg) [Urine:600 (0.3 mL/kg/hr)]  Weight: 50.1 kg     Past Cardiology Tests (Last 3 Years):    Echo:  Transthoracic Echo (TTE) Complete 10/14/2023  CONCLUSIONS:   1. Left ventricular systolic function is normal with a 60-65% estimated ejection fraction.   2. There is moderate concentric left ventricular " hypertrophy.   3. Moderate to severely elevated right ventricular systolic pressure.   4. Aortic valve appears abnormal.   5. Aortic valve: Peak gradient 65.1 mmHg, mean gradient 41 mmHg, DHAVAL 0.7cm2.   6. Severe aortic valve stenosis.          Inpatient Medications:  Scheduled medications   Medication Dose Route Frequency    acetaminophen  650 mg oral TID    albuterol  2.5 mg nebulization TID    aspirin  81 mg oral Daily    atorvastatin  40 mg oral Nightly    budesonide  0.5 mg nebulization BID    cholecalciferol  50 mcg oral BID    DULoxetine  30 mg oral Daily    enoxaparin  40 mg subcutaneous Daily    [Held by provider] ferrous sulfate  65 mg of iron oral Daily    formoterol  20 mcg nebulization BID    gabapentin  200 mg oral TID    guaiFENesin  600 mg oral BID    ketotifen  1 drop Both Eyes BID    lactobacillus acidophilus  1 capsule oral Daily    levoFLOXacin  750 mg intravenous q48h    melatonin  4.5 mg oral Nightly    metoprolol succinate XL  12.5 mg oral Daily    montelukast  10 mg oral Daily    pantoprazole  40 mg oral Daily before breakfast    polyethylene glycol  17 g oral BID    sennosides  1 tablet oral BID    sildenafil  20 mg oral TID     PRN medications   Medication    acetaminophen    acetaminophen    acetaminophen    albuterol    benzonatate    bisacodyl    guaiFENesin    ondansetron    Or    ondansetron    oxyCODONE-acetaminophen    oxygen     Continuous Medications   Medication Dose Last Rate    [Held by provider] sodium chloride 0.9%  125 mL/hr 125 mL/hr (10/21/23 0416)       Physical Exam:  Frail elderly female sitting up in NAD. .   CV rrr, + cardiac murmur; No leg edema. Pulses 2+ and symmetric.    Pulm: Lungs clear with normal respiratory effort.   Neuro Alert and conversant. Grossly nonfocal.      Assessment/Plan   Norah Cleaning is a 83 y.o. female with past medical history significant for hypertension, chronic diastolic heart failure, COPD on chronic home oxygen, emphysema, new  diagnosis of lung cancer, moderate AS ,obstructive sleep apnea, AAA, nonobstructive CAD per cardiac cath 2008 presenting to emergency room complaining of worsening of dyspnea from facility, concerns for pneumonia.  Lab work remarkable for elevated troponin with relatively flat trend at 813, 966, 791, AURA with Cr 1.46( 0.94 on 10/6/23).  Cardiology is consulted for further evaluation of non-ST elevation myocardial infarction.     Currently not on telemetry      Acute diastolic HF.  Appears euvolemic on exam  Last TTE (10/14/23) EF 60-65%, severe AS  With concomitant PNA.  -Euvolemic on exam.   -Resume oral Torsemide 10mg tomorrow   -Was on torsemide 10 mg p.o. daily outpatient     2. Aortic stenosis  Severe. Not a TAVR candidate.      3. Hypertension   BP soft  yesterday. Improved today  Hold Losartan- to be readdress at outpatient appointment with Dr. Holbrook.      4. Elevated troponin  Non-MI troponin elevation / acute non-traumatic myocardial injury. Core measures do not apply.      Recommendations   No cardiac barriers to discharge  Resume Torsemide 10mg tomorrow   Hold Losartan until seen by outpatient cardiologist Dr. Holbrook (appointment requested)    Code Status:  DNR          Darshan Edward, APRN-CNP

## 2023-10-21 NOTE — PROGRESS NOTES
Norah Cleaning is a 83 y.o. female on day 7 of admission presenting with Acute pneumonia.    83 y.o. female with a h/o COPD with chronic hypoxic respiratory failure on 3L NC, recently diagnosed cavitary lung mass, AAA, anxiety, CHF, DLP, GERD, who p/w SOB. Work up in the ED revealed mild leukocytosis, mildly elevated troponin, RUL cavitary mass. Started on antibiotics and admitted to [facility] for further management. Pulmonary is consulted for lung mass and GGO on CT.      Subjective   Low BP yesterday, improved with IVF.  No other events. O2 requirements stable.     SOB is markedly improved. Continues to have cough productive of yellow/green sputum. +ve wheezing. No other complaints.     Objective     Physical Exam  Constitutional:       Appearance: Normal appearance.      Comments: Thin.    HENT:      Head: Normocephalic and atraumatic.      Comments: R facial swelling.      Nose:      Comments: On NC.      Mouth/Throat:      Mouth: Mucous membranes are moist.   Eyes:      General: No scleral icterus.     Extraocular Movements: Extraocular movements intact.      Pupils: Pupils are equal, round, and reactive to light.   Cardiovascular:      Rate and Rhythm: Normal rate and regular rhythm.      Heart sounds: Murmur heard.      No friction rub. No gallop.   Pulmonary:      Effort: No respiratory distress.      Breath sounds: Normal breath sounds. No wheezing or rales.      Comments: Clear lung fields anteriorly with fair air entry.   Abdominal:      General: There is no distension.      Palpations: Abdomen is soft.      Tenderness: There is no abdominal tenderness.   Musculoskeletal:         General: No tenderness.      Cervical back: Neck supple. No rigidity.      Right lower leg: No edema.      Left lower leg: No edema.   Skin:     General: Skin is warm and dry.      Coloration: Skin is not jaundiced.   Neurological:      General: No focal deficit present.      Mental Status: She is alert and oriented to person,  "place, and time.      Cranial Nerves: No cranial nerve deficit.      Motor: No weakness.   Psychiatric:         Mood and Affect: Mood normal.         Behavior: Behavior normal.     Last Recorded Vitals  Blood pressure 131/56, pulse 72, temperature 36.2 °C (97.2 °F), resp. rate 18, height 1.6 m (5' 2.99\"), weight 50.1 kg (110 lb 7.2 oz), SpO2 99 %.  Intake/Output last 3 Shifts:  I/O last 3 completed shifts:  In: - (0 mL/kg)   Out: 600 (12 mL/kg) [Urine:600 (0.3 mL/kg/hr)]  Weight: 50.1 kg     Scheduled medications  acetaminophen, 650 mg, oral, TID  albuterol, 2.5 mg, nebulization, TID  aspirin, 81 mg, oral, Daily  atorvastatin, 40 mg, oral, Nightly  budesonide, 0.5 mg, nebulization, BID  cholecalciferol, 50 mcg, oral, BID  DULoxetine, 30 mg, oral, Daily  enoxaparin, 40 mg, subcutaneous, Daily  [Held by provider] ferrous sulfate, 65 mg of iron, oral, Daily  formoterol, 20 mcg, nebulization, BID  gabapentin, 200 mg, oral, TID  guaiFENesin, 600 mg, oral, BID  ketotifen, 1 drop, Both Eyes, BID  lactobacillus acidophilus, 1 capsule, oral, Daily  levoFLOXacin, 750 mg, intravenous, q48h  melatonin, 4.5 mg, oral, Nightly  metoprolol succinate XL, 12.5 mg, oral, Daily  montelukast, 10 mg, oral, Daily  pantoprazole, 40 mg, oral, Daily before breakfast  polyethylene glycol, 17 g, oral, BID  sennosides, 1 tablet, oral, BID  sildenafil, 20 mg, oral, TID      Continuous medications  [Held by provider] sodium chloride 0.9%, 125 mL/hr, Last Rate: 125 mL/hr (10/21/23 5936)      PRN medications  PRN medications: acetaminophen, acetaminophen, acetaminophen, albuterol, benzonatate, bisacodyl, guaiFENesin, ondansetron **OR** ondansetron, oxyCODONE-acetaminophen, oxygen     Relevant Results  Results for orders placed or performed during the hospital encounter of 10/13/23 (from the past 24 hour(s))   Magnesium   Result Value Ref Range    Magnesium 1.50 (L) 1.60 - 2.40 mg/dL   Basic Metabolic Panel   Result Value Ref Range    Glucose 71 (L) " 74 - 99 mg/dL    Sodium 137 136 - 145 mmol/L    Potassium 3.3 (L) 3.5 - 5.3 mmol/L    Chloride 93 (L) 98 - 107 mmol/L    Bicarbonate 35 (H) 21 - 32 mmol/L    Anion Gap 12 10 - 20 mmol/L    Urea Nitrogen 7 6 - 23 mg/dL    Creatinine 0.84 0.50 - 1.05 mg/dL    eGFR 69 >60 mL/min/1.73m*2    Calcium 8.8 8.6 - 10.3 mg/dL   CBC and Auto Differential   Result Value Ref Range    WBC 6.5 4.4 - 11.3 x10*3/uL    nRBC 0.0 0.0 - 0.0 /100 WBCs    RBC 3.00 (L) 4.00 - 5.20 x10*6/uL    Hemoglobin 8.9 (L) 12.0 - 16.0 g/dL    Hematocrit 29.1 (L) 36.0 - 46.0 %    MCV 97 80 - 100 fL    MCH 29.7 26.0 - 34.0 pg    MCHC 30.6 (L) 32.0 - 36.0 g/dL    RDW 14.6 (H) 11.5 - 14.5 %    Platelets 248 150 - 450 x10*3/uL    MPV 9.4 7.5 - 11.5 fL    Neutrophils % 65.5 40.0 - 80.0 %    Immature Granulocytes %, Automated 0.5 0.0 - 0.9 %    Lymphocytes % 20.0 13.0 - 44.0 %    Monocytes % 10.2 2.0 - 10.0 %    Eosinophils % 3.5 0.0 - 6.0 %    Basophils % 0.3 0.0 - 2.0 %    Neutrophils Absolute 4.25 1.60 - 5.50 x10*3/uL    Immature Granulocytes Absolute, Automated 0.03 0.00 - 0.50 x10*3/uL    Lymphocytes Absolute 1.30 0.80 - 3.00 x10*3/uL    Monocytes Absolute 0.66 0.05 - 0.80 x10*3/uL    Eosinophils Absolute 0.23 0.00 - 0.40 x10*3/uL    Basophils Absolute 0.02 0.00 - 0.10 x10*3/uL   B-type Natriuretic Peptide   Result Value Ref Range     (H) 0 - 99 pg/mL     MR brain w and wo IV contrast    Result Date: 10/20/2023  Interpreted By:  Jaron Carlson, STUDY: MR BRAIN W AND WO IV CONTRAST;  10/20/2023 2:29 pm   INDICATION: Signs/Symptoms:rule out brain mets.   COMPARISON: None.   ACCESSION NUMBER(S): [id number]   ORDERING CLINICIAN: JOSE MELISSA   TECHNIQUE: The brain was studied in the sagittal axial and coronal planes utilizing FLAIR, T1 and T2 weighted images   Following intravenous injection of gadolinium contrast, T1 weighted fat suppressed multiplanar images were also performed.   FINDINGS: There is slight prominence of the cortical sulci and  sylvian fissures. There is mild ventricular dilatation.  There are confluence and scattered foci of abnormal signal within the periventricular white matter bilaterally.  These are compatible with minimal small vessel ischemic changes.  These nonspecific findings could also be produced by a demyelinating or post inflammatory process.  The visualized skull base paranasal sinuses and orbital structures are unremarkable. Diffusion weighted images and associated ADC maps of the brain were unremarkable.  There is no evidence of diffusion restriction to suggest the presence of acute infarction. Gradient echo T2 weighted images fail to demonstrate hemosiderin deposition or other evidence of hemorrhage. Following intravenous injection of there is no abnormal enhancement. Incidental note is made of a punctate developmental venous anomaly in the right occipital lobe best appreciated on axial 205/340. There is normal contrast opacification of the dural venous sinuses.   IMPRESSION * There is no evidence of mass, cerebral infarction or hemorrhage. *Developmental venous anomaly in the right occipital lobe.   THIS EXAMINATION WAS INTERPRETED AT [facility] [place]   Signed by: Jaron Carlson 10/20/2023 3:15 PM Dictation workstation:   [id number]    XR chest 1 view    Result Date: 10/19/2023  Interpreted By:  Luci Taylor, STUDY: XR CHEST 1 VIEW;  10/19/2023 10:09 am   INDICATION: Signs/Symptoms:COUGH, SOB.   COMPARISON: 10/15/2023   ACCESSION NUMBER(S): [id number]   ORDERING CLINICIAN: NIA MIGUEL   FINDINGS: Coarsened interstitial lung markings are again seen. Elevation of the right hemidiaphragm is again noted. Surgical clips are noted in the neck.   Heart is mildly enlarged but is unchanged. Round density seen in the right upper lobe in the paratracheal region, which most likely is related to patient's known mass that was seen on the CT of 10/16/2023       Mild cardiomegaly.   Chronic lung changes.   Round density in the right upper  lobe which corresponds to a mass was seen on the recent CT scan.       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 Work up in the ED revealed mild leukocytosis, mildly elevated troponin, RUL cavitary mass. Started on antibiotics and admitted to [facility] for further management. Pulmonary is consulted for lung mass and GGO on CT.      RUL mass: enlarging overtime, present on CT from 4/2022, but has grown significantly since then. S/p CT guided biopsy that showed PD-L1 NSCLC. Undergoing staging as outpatient.      MRI brain 10/20 negative for mets     FU with pulmonary within a week, will need bronch + EBUS biopsy vs PET/CT as outpatient for staging     FU with hematology as outpatient      COPD: per patient was diagnosed 15 years ago, does not seem to be in acute exacerbation. No PFT in our system. On Advair, Spiriva, Montelukast and albuterol as home     Continue albuterol, would change to Duo-Neb     Continue Formoterol, Budesonide and Montelukast     PFT as outpatient     FU with pulmonary after DC     Possible pneumonia: given rapid increase in size of the RUL opacity, concerning for post obstructive pneumonia.      Continue Levaquin (as per primary team, would benefit from switching to Zosyn + Vancomycin if not improvement)     Sputum culture     Pulmonary  HTN: likely group 2 and 3. Last echo normal RV and RVSF, RVSP 50.     Continue outpatient Revatio     Continue supplemental O2     CHF management as per cardiology and primary team.     Respiratory failure: with hypoxia, on 3.5L at home. O2 requirements close to baseline.      Continue Supplemental O2, wean off as tolerates       GERD:      Continue Protonix     DVT prophylaxis with Lovenox.      Pulmonary will follow up while in house.      Reena Harley MD

## 2023-10-22 NOTE — DISCHARGE SUMMARY
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 She is on 3.5 L of oxygen at home.     Troponin elevated to several hundreds. CXR with questionable infiltrate. VQ scan indeterminate. She is on heparin drip. ID and cardio consulted.     10/17: CT chest w/o PE, so heparin stopped; does show increased size of lung mass with new effusions/GGO. IV lasix started. Antibiotics switched to levofloxacin.   10/18: Seems a bit better today. Continue current regimen. CT abd and MRI brain do not show any other areas of cancer.   10/19: She says her breathing feels a bit worse today. Will consult pulm today. Continue lasix and levofloxacin. Of note, PM lasix has been held the past 2 nights due to low BP.     10/22: Her breathing feels better; lungs sound better today. Cardio has recommended switching to torsemide; pulm recommended continuing antibiotic. Referral made for expedited oncology appt, hopefully for this coming week. She likely has post-obstructive pneumonia from her tumor, so will continue her on levofloxacin this week. Discharged back to her [facility] in stable condition.     Test Results Pending At Discharge  Pending  "Labs       Order Current Status    Troponin, High Sensitivity, 1 Hour Collected (10/14/23 0709)    Troponin Series, (0, 1 HR) In process    Extra Tubes Preliminary result    Extra Tubes Preliminary result    Extra Tubes Preliminary result    Lavender Top Preliminary result    Lavender Top Preliminary result    SST TOP Preliminary result            Pertinent Physical Exam At Time of Discharge  BP (!) 113/48   Pulse 73   Temp 36.8 °C (98.2 °F) (Oral)   Resp 17   Ht 1.6 m (5' 2.99\")   Wt 50.1 kg (110 lb 7.2 oz)   SpO2 95%   BMI 19.57 kg/m²   Physical Exam  Cardiovascular:      Rate and Rhythm: Normal rate and regular rhythm.      Heart sounds: Murmur heard.   Pulmonary:      Breath sounds: Normal breath sounds.      Comments: Fairly CTAB  Abdominal:      General: Bowel sounds are normal.      Palpations: Abdomen is soft.   Musculoskeletal:         General: Normal range of motion.   Neurological:      General: No focal deficit present.      Mental Status: She is alert and oriented to person, place, and time.   Psychiatric:         Mood and Affect: Mood normal.         Home Medications     Medication List      START taking these medications     levoFLOXacin 750 mg tablet; Commonly known as: Levaquin; Take 1 tablet   (750 mg) by mouth every other day for 4 doses. Do not start before October 23, 2023.; Start taking on: October 23, 2023   polyethylene glycol packet; Commonly known as: Glycolax, Miralax; Take   17 g by mouth once daily.   sildenafil 20 mg tablet; Commonly known as: Revatio; DO NOT GIVE FOR   BLOOD PRESSURE LESS THAN 110 CHECK WITH YOUR DOCTOR IF YOU NEED TO   CONTINUE THIS; Replaces: sildenafil 25 mg tablet     CHANGE how you take these medications     guaiFENesin 600 mg 12 hr tablet; Commonly known as: Mucinex; Take 1   tablet (600 mg) by mouth 2 times a day for 7 days. Do not crush, chew, or   split.; What changed: how much to take   losartan 50 mg tablet; Commonly known as: Cozaar; DO NOT TAKE " THIS TILL   DISCUSS WITH YOUR DOCTOR; What changed: how much to take, how to take   this, when to take this, additional instructions     CONTINUE taking these medications     * acetaminophen 650 mg suppository; Commonly known as: Tylenol   * acetaminophen 325 mg tablet; Commonly known as: Tylenol   * acetaminophen 325 mg tablet; Commonly known as: Tylenol   albuterol 90 mcg/actuation inhaler   alendronate 70 mg tablet; Commonly known as: Fosamax   aspirin 81 mg chewable tablet   atorvastatin 40 mg tablet; Commonly known as: Lipitor   benzonatate 100 mg capsule; Commonly known as: Tessalon   cholecalciferol 50 MCG (2000 UT) tablet; Commonly known as: Vitamin D-3   diclofenac sodium 1 % gel gel; Commonly known as: Voltaren   Dulcolax (bisacodyl) 10 mg suppository; Generic drug: bisacodyl   DULoxetine 30 mg DR capsule; Commonly known as: Cymbalta   ferrous sulfate 325 (65 Fe) MG tablet   fluticasone propion-salmeteroL 500-50 mcg/dose diskus inhaler; Commonly   known as: Advair Diskus   gabapentin 100 mg capsule; Commonly known as: Neurontin   ipratropium-albuteroL 0.5-2.5 mg/3 mL nebulizer solution; Commonly known   as: Duo-Neb   Lactobacillus acidophilus 1 billion cell capsule   lidocaine 4 % patch   magnesium hydroxide 400 mg/5 mL suspension; Commonly known as: Milk of   Magnesia   melatonin 5 mg tablet   metoprolol succinate XL 25 mg 24 hr tablet; Commonly known as: Toprol-XL   mineral oil enema   montelukast 10 mg tablet; Commonly known as: Singulair   nicotine polacrilex 2 mg lozenge; Commonly known as: Commit   olopatadine 0.1 % ophthalmic solution; Commonly known as: Patanol   ondansetron 4 mg tablet; Commonly known as: Zofran   pantoprazole 40 mg EC tablet; Commonly known as: ProtoNix   sennosides 8.6 mg tablet; Commonly known as: Senokot   Spiriva Respimat 2.5 mcg/actuation inhaler; Generic drug: tiotropium   torsemide 10 mg tablet; Commonly known as: Demadex; Take 1 tablet (10   mg) by mouth once daily. For  edema Do not start before October 23, 2023.;   Start taking on: October 23, 2023  * This list has 3 medication(s) that are the same as other medications   prescribed for you. Read the directions carefully, and ask your doctor or   other care provider to review them with you.     STOP taking these medications     oxyCODONE-acetaminophen 5-325 mg tablet; Commonly known as: Percocet   sildenafil 25 mg tablet; Commonly known as: Viagra; Replaced by:   sildenafil 20 mg tablet       Outpatient Follow-Up  No future appointments.    Jack Ortez MD    I spent more than 30 min coordinating this patient's discharge.
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
## 2023-10-26 NOTE — PROGRESS NOTES
PROGRESS NOTE    Subjective   Chief complaint: Norah Cleaning is a 83 y.o. female who is an acute skilled patient being seen and evaluated for weakness,   Pneumonia,  lung cancer    HPI:  .  Patient recently readmitted after brief hospitalization was found to have new diagnosis of lung cancer.  She will follow-up with oncology for further treatment.  Patient continues on antibiotic for pneumonia.  No adverse effects noted from antibiotic therapy.  Denies fever, chills.  Denies  shortness of breath, wheezing, or orthopnea,   Moist cough improving.  Denies chest pain or headache.  No acute distress.      Objective   Vital signs: , 128/72, 98%.    Physical Exam  Constitutional:       General: She is not in acute distress.  Eyes:      Extraocular Movements: Extraocular movements intact.   Cardiovascular:      Rate and Rhythm: Normal rate and regular rhythm.   Pulmonary:      Effort: Pulmonary effort is normal.      Breath sounds: Normal breath sounds.   Abdominal:      General: Bowel sounds are normal.      Palpations: Abdomen is soft.   Musculoskeletal:      Cervical back: Neck supple.      Right lower leg: No edema.      Left lower leg: No edema.   Neurological:      Mental Status: She is alert.   Psychiatric:         Mood and Affect: Mood normal.         Behavior: Behavior is cooperative.         Assessment/Plan   Problem List Items Addressed This Visit       Weakness - Primary     Continue OT.         HTN (hypertension)     Monitor BP  Labs          COPD (chronic obstructive pulmonary disease) (CMS/HCC)     Has moist cough  Requires oxygen  Wheezing with basilar rales   Lung mass discovered right upper lobe, biopsy done. 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   Levaquin started - symptomatic   Has elevated WBC.   Continue with aerosol treatments            Acute pneumonia     .  Continue Levaquin         Squamous cell carcinoma of right lung  (CMS/[facility])       Follow-up with oncology tomorrow to decide on treatment         Acute diastolic HF (heart failure) (CMS/[facility])     .  Monitor for shortness of breath          Medications, treatments, and labs reviewed  Continue medications and treatments as listed in PCC    Scribe Attestation  By signing my name below, I, Duane Lau   attest that this documentation has been prepared under the direction and in the presence of Shyla Kan MD.    Provider Attestation - Scribe documentation  All medical record entries made by the Scribe were at my direction and personally dictated by me. I have reviewed the chart and agree that the record accurately reflects my personal performance of the history, physical exam, discussion and plan.  1. Weakness        2. Squamous cell carcinoma of right lung (CMS/HCC)        3. Primary hypertension        4. Chronic obstructive pulmonary disease with acute lower respiratory infection (CMS/HCC)        5. Acute diastolic HF (heart failure) (CMS/[facility])        6. Acute pneumonia

## 2023-10-26 NOTE — LETTER
Patient: Norah Cleaning  : 1940    Encounter Date: 10/26/2023    PROGRESS NOTE    Subjective  Chief complaint: Norah Cleaning is a 83 y.o. female who is an acute skilled patient being seen and evaluated for weakness,   Pneumonia,  lung cancer    HPI:  .  Patient recently readmitted after brief hospitalization was found to have new diagnosis of lung cancer.  She will follow-up with oncology for further treatment.  Patient continues on antibiotic for pneumonia.  No adverse effects noted from antibiotic therapy.  Denies fever, chills.  Denies  shortness of breath, wheezing, or orthopnea,   Moist cough improving.  Denies chest pain or headache.  No acute distress.      Objective  Vital signs: , 128/72, 98%.    Physical Exam  Constitutional:       General: She is not in acute distress.  Eyes:      Extraocular Movements: Extraocular movements intact.   Cardiovascular:      Rate and Rhythm: Normal rate and regular rhythm.   Pulmonary:      Effort: Pulmonary effort is normal.      Breath sounds: Normal breath sounds.   Abdominal:      General: Bowel sounds are normal.      Palpations: Abdomen is soft.   Musculoskeletal:      Cervical back: Neck supple.      Right lower leg: No edema.      Left lower leg: No edema.   Neurological:      Mental Status: She is alert.   Psychiatric:         Mood and Affect: Mood normal.         Behavior: Behavior is cooperative.         Assessment/Plan  Problem List Items Addressed This Visit       Weakness - Primary     Continue OT.         HTN (hypertension)     Monitor BP  Labs          COPD (chronic obstructive pulmonary disease) (CMS/HCC)     Has moist cough  Requires oxygen  Wheezing with basilar rales   Lung mass discovered right upper lobe, biopsy done. 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   Levaquin started - symptomatic   Has elevated WBC.   Continue with aerosol treatments            Acute pneumonia      .  Continue Levaquin         Squamous cell carcinoma of right lung (CMS/HCC)       Follow-up with oncology tomorrow to decide on treatment         Acute diastolic HF (heart failure) (CMS/HCC)     .  Monitor for shortness of breath          Medications, treatments, and labs reviewed  Continue medications and treatments as listed in [facility]    Scribe Attestation  By signing my name below, I, Isaac Lauibe   attest that this documentation has been prepared under the direction and in the presence of Shyla Kan MD.    Provider Attestation - Scribe documentation  All medical record entries made by the Scribe were at my direction and personally dictated by me. I have reviewed the chart and agree that the record accurately reflects my personal performance of the history, physical exam, discussion and plan.  1. Weakness        2. Squamous cell carcinoma of right lung (CMS/HCC)        3. Primary hypertension        4. Chronic obstructive pulmonary disease with acute lower respiratory infection (CMS/HCC)        5. Acute diastolic HF (heart failure) (CMS/HCC)        6. Acute pneumonia              Electronically Signed By: Shyla Kan MD   10/26/23  8:40 PM

## 2023-10-27 PROBLEM — N18.9 CKD (CHRONIC KIDNEY DISEASE): Status: ACTIVE | Noted: 2023-01-01

## 2023-10-27 PROBLEM — M54.16 LUMBAR RADICULOPATHY: Status: ACTIVE | Noted: 2023-01-01

## 2023-10-27 PROBLEM — F33.41 RECURRENT MAJOR DEPRESSIVE DISORDER, IN PARTIAL REMISSION (CMS-HCC): Status: ACTIVE | Noted: 2023-01-01

## 2023-10-27 PROBLEM — I73.9 PAD (PERIPHERAL ARTERY DISEASE) (CMS-HCC): Status: ACTIVE | Noted: 2023-01-01

## 2023-10-27 PROBLEM — M79.7 FIBROMYALGIA: Status: ACTIVE | Noted: 2023-01-01

## 2023-10-27 PROBLEM — K58.9 IRRITABLE BOWEL SYNDROME: Status: ACTIVE | Noted: 2023-01-01

## 2023-10-27 PROBLEM — R06.09 DYSPNEA ON EXERTION: Status: ACTIVE | Noted: 2023-01-01

## 2023-10-27 PROBLEM — E27.40 ADRENAL INSUFFICIENCY (MULTI): Status: ACTIVE | Noted: 2023-01-01

## 2023-10-27 PROBLEM — H35.30 MACULAR DEGENERATION: Status: ACTIVE | Noted: 2023-01-01

## 2023-10-27 PROBLEM — E55.9 VITAMIN D DEFICIENCY: Status: ACTIVE | Noted: 2023-01-01

## 2023-10-27 PROBLEM — R63.4 ABNORMAL WEIGHT LOSS: Status: ACTIVE | Noted: 2023-01-01

## 2023-10-27 PROBLEM — L81.4 OTHER MELANIN HYPERPIGMENTATION: Status: ACTIVE | Noted: 2021-10-29

## 2023-10-27 PROBLEM — I25.10 CORONARY ARTERY DISEASE: Status: ACTIVE | Noted: 2023-01-01

## 2023-10-27 PROBLEM — L82.0 INFLAMED SEBORRHEIC KERATOSIS: Status: ACTIVE | Noted: 2021-10-29

## 2023-10-27 PROBLEM — I65.29 ASYMPTOMATIC CAROTID ARTERY STENOSIS: Status: ACTIVE | Noted: 2023-01-01

## 2023-10-27 PROBLEM — D22.39 MELANOCYTIC NEVI OF OTHER PARTS OF FACE: Status: ACTIVE | Noted: 2021-10-29

## 2023-10-27 PROBLEM — G89.29 CHRONIC IDIOPATHIC PAIN SYNDROME: Status: ACTIVE | Noted: 2023-01-01

## 2023-10-27 PROBLEM — F11.90 CHRONIC, CONTINUOUS USE OF OPIOIDS: Status: ACTIVE | Noted: 2023-01-01

## 2023-10-27 PROBLEM — E46 PROTEIN MALNUTRITION (MULTI): Status: ACTIVE | Noted: 2023-01-01

## 2023-10-27 PROBLEM — F45.41: Status: ACTIVE | Noted: 2023-01-01

## 2023-10-27 PROBLEM — G47.52 REM SLEEP BEHAVIOR DISORDER: Status: ACTIVE | Noted: 2023-01-01

## 2023-10-27 PROBLEM — R25.1 TREMOR: Status: ACTIVE | Noted: 2023-01-01

## 2023-10-27 PROBLEM — I50.9 CHF (CONGESTIVE HEART FAILURE) (MULTI): Status: ACTIVE | Noted: 2023-01-01

## 2023-10-27 PROBLEM — F17.210 CONTINUOUS DEPENDENCE ON CIGARETTE SMOKING: Status: ACTIVE | Noted: 2023-01-01

## 2023-10-27 PROBLEM — E27.49 SECONDARY ADRENAL INSUFFICIENCY (MULTI): Status: ACTIVE | Noted: 2023-01-01

## 2023-10-27 PROBLEM — K59.09 CHRONIC CONSTIPATION: Status: ACTIVE | Noted: 2023-01-01

## 2023-10-27 PROBLEM — M81.0 OSTEOPOROSIS: Status: ACTIVE | Noted: 2023-01-01

## 2023-10-27 PROBLEM — L85.8 OTHER SPECIFIED EPIDERMAL THICKENING: Status: ACTIVE | Noted: 2021-10-29

## 2023-10-27 PROBLEM — Q74.2 FEMORAL ABNORMALITY: Status: ACTIVE | Noted: 2023-01-01

## 2023-10-27 PROBLEM — M16.11 ARTHRITIS OF RIGHT HIP: Status: ACTIVE | Noted: 2023-01-01

## 2023-10-27 PROBLEM — F17.200 TOBACCO DEPENDENCE: Status: ACTIVE | Noted: 2023-01-01

## 2023-10-27 PROBLEM — E83.42 HYPOMAGNESEMIA: Status: ACTIVE | Noted: 2023-01-01

## 2023-10-27 PROBLEM — L97.321: Status: ACTIVE | Noted: 2021-10-29

## 2023-10-27 PROBLEM — S22.000A THORACIC COMPRESSION FRACTURE (MULTI): Status: ACTIVE | Noted: 2023-01-01

## 2023-10-27 PROBLEM — I65.23 BILATERAL CAROTID ARTERY STENOSIS: Status: ACTIVE | Noted: 2023-01-01

## 2023-10-27 PROBLEM — I70.0 ATHEROSCLEROSIS OF AORTA (CMS-HCC): Status: ACTIVE | Noted: 2023-01-01

## 2023-10-27 PROBLEM — G93.32 CHRONIC FATIGUE SYNDROME: Status: ACTIVE | Noted: 2023-01-01

## 2023-10-27 PROBLEM — M50.90 CERVICAL DISC DISEASE: Status: ACTIVE | Noted: 2023-01-01

## 2023-10-27 PROBLEM — M54.14 THORACIC NEURITIS: Status: ACTIVE | Noted: 2023-01-01

## 2023-10-27 PROBLEM — L97.821 NON-PRESSURE CHRONIC ULCER OF OTHER PART OF LEFT LOWER LEG LIMITED TO BREAKDOWN OF SKIN (MULTI): Status: ACTIVE | Noted: 2021-10-29

## 2023-10-27 PROBLEM — M19.90 ARTHRITIS: Status: ACTIVE | Noted: 2023-01-01

## 2023-10-27 PROBLEM — F43.21 ADJUSTMENT DISORDER WITH DEPRESSED MOOD: Status: ACTIVE | Noted: 2023-01-01

## 2023-10-27 PROBLEM — R22.30 ARM MASS: Status: ACTIVE | Noted: 2023-01-01

## 2023-10-27 PROBLEM — E78.5 HYPERLIPIDEMIA: Status: ACTIVE | Noted: 2023-01-01

## 2023-10-27 PROBLEM — D35.2 PITUITARY MICROADENOMA (MULTI): Status: ACTIVE | Noted: 2023-01-01

## 2023-10-27 PROBLEM — G47.33 OBSTRUCTIVE SLEEP APNEA: Status: ACTIVE | Noted: 2023-01-01

## 2023-10-27 PROBLEM — R68.81 EARLY SATIETY: Status: ACTIVE | Noted: 2023-01-01

## 2023-10-27 PROBLEM — M51.36 LUMBAR DEGENERATIVE DISC DISEASE: Status: ACTIVE | Noted: 2023-01-01

## 2023-10-27 PROBLEM — R73.02 IGT (IMPAIRED GLUCOSE TOLERANCE): Status: ACTIVE | Noted: 2023-01-01

## 2023-10-27 PROBLEM — M87.059 AVASCULAR NECROSIS OF HIP (MULTI): Status: ACTIVE | Noted: 2023-01-01

## 2023-10-27 PROBLEM — G25.9 MOVEMENT DISORDER: Status: ACTIVE | Noted: 2023-01-01

## 2023-10-27 PROBLEM — G47.00 INSOMNIA: Status: ACTIVE | Noted: 2023-01-01

## 2023-10-27 PROBLEM — D22.5 MELANOCYTIC NEVI OF TRUNK: Status: ACTIVE | Noted: 2021-10-29

## 2023-10-27 PROBLEM — N28.1 RENAL CYST: Status: ACTIVE | Noted: 2023-01-01

## 2023-10-27 PROBLEM — M48.00 SPINAL STENOSIS: Status: ACTIVE | Noted: 2023-01-01

## 2023-10-27 PROBLEM — R91.1 LUNG NODULE: Status: ACTIVE | Noted: 2023-01-01

## 2023-10-27 NOTE — LETTER
Patient: Norah Cleaning  : 1940    Encounter Date: 10/27/2023    PROGRESS NOTE    Subjective  Chief complaint: Norah Cleannig is a 83 y.o. female who is a long term care patient being seen and evaluated for moist cough.     HPI:  She reports that she is scheduled to talk with her oncologist this afternoon. Remains with moist cough. Remains on oxygen therapy. Self propelling in wheelchair. Denies any chest pain. Reports that she has little appetite   HPI      Objective  Vital signs: 118/66-72-22-96%    Physical Exam  Constitutional:       General: She is not in acute distress.     Appearance: She is cachectic.      Comments: Reports little appetite.    Eyes:      Extraocular Movements: Extraocular movements intact.   Cardiovascular:      Rate and Rhythm: Normal rate and regular rhythm.   Pulmonary:      Effort: Pulmonary effort is normal.      Breath sounds: Normal breath sounds.      Comments: Moist nonproductive cough  Lungs sounds clear, diminished inspiratory effect in lower bases.    Abdominal:      General: Bowel sounds are normal.      Palpations: Abdomen is soft.   Musculoskeletal:      Cervical back: Neck supple.      Right lower leg: No edema.      Left lower leg: No edema.   Neurological:      Mental Status: She is alert.   Psychiatric:         Mood and Affect: Mood normal.         Behavior: Behavior is cooperative.         Assessment/Plan  Problem List Items Addressed This Visit       Weakness - Primary     Requires assistanece  Poor endurance  Able to walk short distances in her room  Encourage energy copnservation          COPD (chronic obstructive pulmonary disease) (CMS/HCC)     Remains with moist cough  Has new diagnosis lung cancer.   Remains on oxygen  Has appointment with oncologist this afternoon          Acute diastolic HF (heart failure) (CMS/HCC)     Monitor CV status. Remains on oxygen  No edema          Protein malnutrition (CMS/HCC)     Frail  Reports little appetite  Has  lost significant weight loss  Is on supplements  New diagnosis of lung cancer           Medications, treatments, and labs reviewed  Continue medications and treatments as listed in EMR    CELIO Luz      Electronically Signed By: CELIO Luz   10/27/23  9:31 PM

## 2023-10-27 NOTE — PROGRESS NOTES
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 immunohistochemical assay using Monoclonal Mouse Anti-PD-L1, Clone 22C3 intended for use in the detection of PD-L1 protein in formalin-fixed, paraffin-embedded (FFPE) non-small cell lung cancer (NSCLC) tissue using the Optiview detection on a [place] BenchMark Ultra. The specimen submitted for testing should contain at least 100 viable tumor cells to be considered adequate for evaluation. This assay is indicated as an aid in identifying NSCLC patients for treatment with pembrolizumab (KEYTRUDA).    Methodology:  PD-L1 protein expression is determined by using Tumor Proportion Score (TPS), which is the percentage of viable tumor cells showing partial or complete membrane staining at any intensity. In the clinical setting of first-line therapy (treatment-naïve patients), the specimen is considered PD-L1 positive if the TPS is equal to or greater than 50 percent. In the setting of second-line therapy, the specimen is considered positive if the TPS is equal to or greater than 1 percent.    Reference Range:  High Expression >=50% TPS  Low Expression 1-49% TPS  No Expression <1% TPS    This assay is validated and FDA-approved for lung cancer specimens only. For all other specimen types, results should be interpreted with caution and within the appropriate clinical context. The use  of this assay on decalcified tissues has not been validated and is not recommended.   One or more of the reagents used to perform assays on this specimen MAY have contained components considered to be Laboratory Developed Tests (LDT).  LDT's have not been cleared or approved by the U.S. Food and Drug Administration.  These assays/tests were developed and their performance characteristics determined by the Department of Pathology Immunohistochemistry Lab at [facility]. The FDA does not require this test to go through premarket FDA review. This test is used for clinical purposes. It should not be regarded as investigational or for research. This laboratory is certified under the Clinical Laboratory Improvement Amendments (CLIA) as qualified to perform high complexity clinical laboratory testing.  The assays/tests were performed with appropriate positive and negative controls which stained appropriately.      ADD2  06/28/2019     A.  BODY ANTRUM, BIOPSY (COLD FORCEP):    -- Immunohistochemical staining for Helicobacter pylori is negative.      COMDX  10/05/2023     The tumor cells are positive for p40 immunostain. PDL1 immunostain and NGS molecular study are ordered and results will be reported in addenda.    Tumor Block:  A1    Preliminary Assessment of Specimen Adequacy for Ancillary Testing:  Adequate    Viable tumor nuclei :  80%         MICROSATELLITE STATUS: Microsatellite Instability-High (MSI-H) is NOT DETECTED.        DISEASE ASSOCIATED GENOMIC FINDINGS:  TP53 p.K164* ([id number]>T)    Assessment/Plan      Norah Cleaning is a 83 y.o. female here for recommendations and to establish care for newly diagnosed squamous cell carcinoma of the lung.    Reviewed overall work-up to date with patient and her daughter. At this time at least locally advanced disease (stage IIIA), though with pleural effusions (which may be from cardiac etiology) which would represent stage IV  disease if related to malignancy. She has an asymmetric marcela area in the R neck that is not clearly a lymph node but is large than the L.     Discussed that surgery would not be an option, and to fully understand staging, I recommend a PET scan. If no evidence of metastatic disease, we discussed that standard treatment otherwise for stage III disease would be definitive chemoradiation. However, I do not think she is medically appropriate for this and we discussed that it would not be safe.     Alternatively we discussed consideration of palliative radiation, if PET does not show significant disease elsewhere, which may help with her cough and provide some local control. If this is felt to be too high risk given her underlying medical conditions/pulmonary status, we also discussed the role of systemic therapy with pembrolizumab. I think this could be a reasonable option, though we also discussed that with PD-L1 negative status there is a lower likelihood of response. We also discussed that though immunotherapy is generally well tolerated, there can be more serious side effects.    We discussed prognosis with and without treatment. We also discussed alternative to cancer-directed therapy which would be maximal supportive care -  managing symptoms such as dyspnea/cough and anything else that arises. We discussed that this is most often with the support of a hospice team.    #Squamous cell carcinoma of the lung  - Plan PET to better understand stage/extent of disease  - Will refer to rad/onc for evaluation of risks/benefits of palliative RT  - Pending plan for radiation or not, as well as PET results, we will meet again to review option of immunotherapy  - Also discussed that this is a high risk time, and though we are planning to further explore options, there is high risk of acute complication in the near future particularly with response to airway obstruction/post-obstructive PNA    RTC in 2 weeks to review results  and next steps. She was encouraged to call if any issues prior to follow-up.      Susana Sauer MD  [facility]

## 2023-10-28 NOTE — ASSESSMENT & PLAN NOTE
Frail  Reports little appetite  Has lost significant weight loss  Is on supplements  New diagnosis of lung cancer

## 2023-10-28 NOTE — ASSESSMENT & PLAN NOTE
Requires assistanece  Poor endurance  Able to walk short distances in her room  Encourage energy copnservation

## 2023-10-28 NOTE — ASSESSMENT & PLAN NOTE
Remains with moist cough  Has new diagnosis lung cancer.   Remains on oxygen  Has appointment with oncologist this afternoon

## 2023-10-28 NOTE — PROGRESS NOTES
PROGRESS NOTE    Subjective   Chief complaint: Norah Cleaning is a 83 y.o. female who is a long term care patient being seen and evaluated for moist cough.     HPI:  She reports that she is scheduled to talk with her oncologist this afternoon. Remains with moist cough. Remains on oxygen therapy. Self propelling in wheelchair. Denies any chest pain. Reports that she has little appetite   HPI      Objective   Vital signs: 118/66-72-22-96%    Physical Exam  Constitutional:       General: She is not in acute distress.     Appearance: She is cachectic.      Comments: Reports little appetite.    Eyes:      Extraocular Movements: Extraocular movements intact.   Cardiovascular:      Rate and Rhythm: Normal rate and regular rhythm.   Pulmonary:      Effort: Pulmonary effort is normal.      Breath sounds: Normal breath sounds.      Comments: Moist nonproductive cough  Lungs sounds clear, diminished inspiratory effect in lower bases.    Abdominal:      General: Bowel sounds are normal.      Palpations: Abdomen is soft.   Musculoskeletal:      Cervical back: Neck supple.      Right lower leg: No edema.      Left lower leg: No edema.   Neurological:      Mental Status: She is alert.   Psychiatric:         Mood and Affect: Mood normal.         Behavior: Behavior is cooperative.         Assessment/Plan   Problem List Items Addressed This Visit       Weakness - Primary     Requires assistanece  Poor endurance  Able to walk short distances in her room  Encourage energy copnservation          COPD (chronic obstructive pulmonary disease) (CMS/HCC)     Remains with moist cough  Has new diagnosis lung cancer.   Remains on oxygen  Has appointment with oncologist this afternoon          Acute diastolic HF (heart failure) (CMS/HCC)     Monitor CV status. Remains on oxygen  No edema          Protein malnutrition (CMS/HCC)     Frail  Reports little appetite  Has lost significant weight loss  Is on supplements  New diagnosis of lung  cancer           Medications, treatments, and labs reviewed  Continue medications and treatments as listed in EMR    Aleida Jackson, APRN-CNP

## 2023-10-30 NOTE — LETTER
Patient: Norah Cleaning  : 1940    Encounter Date: 10/30/2023    PROGRESS NOTE    Subjective  Chief complaint: Norah Cleaning is a 83 y.o. female who is an acute skilled patient being seen and evaluated for weakness.    HPI:  Patient in SNF to work with therapy to improve strength. Patient currently has unsteady gait, requiring 1 person assist with transfers. Patient utilizes w\c. Patient is stable with no SOB, denies n,v,f,c,pain. No new nursing concerns.       Objective  Vital signs: 120/62,68,96%    Physical Exam  Constitutional:       General: She is not in acute distress.  Eyes:      Extraocular Movements: Extraocular movements intact.   Cardiovascular:      Rate and Rhythm: Normal rate and regular rhythm.   Pulmonary:      Effort: Pulmonary effort is normal.      Breath sounds: Normal breath sounds.   Abdominal:      General: Bowel sounds are normal.      Palpations: Abdomen is soft.   Musculoskeletal:      Cervical back: Neck supple.      Right lower leg: No edema.      Left lower leg: No edema.   Neurological:      Mental Status: She is alert.   Psychiatric:         Mood and Affect: Mood normal.         Behavior: Behavior is cooperative.         Assessment/Plan  Problem List Items Addressed This Visit       Weakness     Continue therapy          HTN (hypertension)     Monitor BP  Labs          COPD (chronic obstructive pulmonary disease) (CMS/[facility])     Remains with moist cough  Has new diagnosis lung cancer.   Remains on oxygen  Oncology following         CHF (congestive heart failure) (CMS/[facility])     Continue diuretic  Monitor SOB\weight gain          Medications, treatments, and labs reviewed  Continue medications and treatments as listed in EMR    Scribe Attestation  I, Duane Ch   attest that this documentation has been prepared under the direction and in the presence of Shyla Kan MD.     Provider Attestation - Scribe documentation  All medical record entries made by  the Scribe were at my direction and personally dictated by me. I have reviewed the chart and agree that the record accurately reflects my personal performance of the history, physical exam, discussion and plan.   Shyla Kan MD      Electronically Signed By: Shyla Kan MD   10/31/23  2:48 PM

## 2023-10-31 NOTE — PROGRESS NOTES
PROGRESS NOTE    Subjective   Chief complaint: Norah Cleaning is a 83 y.o. female who is an acute skilled patient being seen and evaluated for weakness.    HPI:  Patient in SNF to work with therapy to improve strength. Patient currently has unsteady gait, requiring 1 person assist with transfers. Patient utilizes w\c. Patient is stable with no SOB, denies n,v,f,c,pain. No new nursing concerns.       Objective   Vital signs: 120/62,68,96%    Physical Exam  Constitutional:       General: She is not in acute distress.  Eyes:      Extraocular Movements: Extraocular movements intact.   Cardiovascular:      Rate and Rhythm: Normal rate and regular rhythm.   Pulmonary:      Effort: Pulmonary effort is normal.      Breath sounds: Normal breath sounds.   Abdominal:      General: Bowel sounds are normal.      Palpations: Abdomen is soft.   Musculoskeletal:      Cervical back: Neck supple.      Right lower leg: No edema.      Left lower leg: No edema.   Neurological:      Mental Status: She is alert.   Psychiatric:         Mood and Affect: Mood normal.         Behavior: Behavior is cooperative.         Assessment/Plan   Problem List Items Addressed This Visit       Weakness     Continue therapy          HTN (hypertension)     Monitor BP  Labs          COPD (chronic obstructive pulmonary disease) (CMS/[facility])     Remains with moist cough  Has new diagnosis lung cancer.   Remains on oxygen  Oncology following         CHF (congestive heart failure) (CMS/[facility])     Continue diuretic  Monitor SOB\weight gain          Medications, treatments, and labs reviewed  Continue medications and treatments as listed in EMR    Scribe Attestation  Priscilla POLLOCK Scribe   attest that this documentation has been prepared under the direction and in the presence of Shyla Kan MD.     Provider Attestation - Scribe documentation  All medical record entries made by the Scribe were at my direction and personally dictated by me. I have  reviewed the chart and agree that the record accurately reflects my personal performance of the history, physical exam, discussion and plan.   Shyla Kan MD

## 2023-11-02 NOTE — LETTER
Patient: Norah Cleaning  : 1940    Encounter Date: 2023    PROGRESS NOTE    Subjective  Chief complaint: Norah Cleaning is a 83 y.o. female who is an acute skilled patient being seen and evaluated for weakness    HPI:  Patient working in therapy due to weakness and debility. Patient currently has unsteady gait, requiring 1 person assist with transfers. Patient utilizes w\c. For mobility.  Patient is stable with no SOB, Denies constitutional symptoms. No new nursing concerns.           Objective  Vital signs: 122/66, 98%    Physical Exam  Constitutional:       General: She is not in acute distress.  Eyes:      Extraocular Movements: Extraocular movements intact.   Cardiovascular:      Rate and Rhythm: Normal rate and regular rhythm.   Pulmonary:      Effort: Pulmonary effort is normal.      Breath sounds: Normal breath sounds.   Abdominal:      General: Bowel sounds are normal.      Palpations: Abdomen is soft.   Musculoskeletal:      Cervical back: Neck supple.      Right lower leg: No edema.      Left lower leg: No edema.   Neurological:      Mental Status: She is alert.   Psychiatric:         Mood and Affect: Mood normal.         Behavior: Behavior is cooperative.         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    Provider Attestation - Scribe documentation  All medical record entries made by the Scribe were at my direction and personally dictated by me. I have reviewed the chart and  agree that the record accurately reflects my personal performance of the history, physical exam, discussion and plan.  1. Weakness        2. Primary hypertension        3. 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

## 2023-11-02 NOTE — PROGRESS NOTES
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 metastasis. Multiple FDG avid bone lesions throughout the axial and appendicular skeleton as described above, likely  representing bone metastases. FDG avidity adjacent to proximal right femur which may be inflammatory related to total hip arthroplasty, however bone metastasis can not be excluded.     Reviewing the images, her updated staging will be as noted by Dr. 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 The mediastinal adenopathy is also small. None of the bony lesions also looked very concerning. Dr. Sauer has discussed IO alone as an option for her. As such, we will hold off on radiation at this time and could consider in future should she have symptomatic progression.

## 2023-11-03 NOTE — LETTER
Patient: Norah Cleaning  : 1940    Encounter Date: 2023    PROGRESS NOTE    Subjective  Chief complaint: Norah Cleaning is a 83 y.o. female who is an acute skilled patient being seen and evaluated for weakness    HPI: is sleeping comfortable. Has had visible weight loss. Remains in therapy. The therapist reports that she is progressing slowly. Nursing reports that her appetite is inconsistent and she has agreed with  the recommendation of chemotherapy.    HPI      Objective  Vital signs: 135/72-82-18-97.3*-    Physical Exam  Vitals and nursing note reviewed.   Constitutional:       General: She is sleeping. She is not in acute distress.     Appearance: She is well-groomed and underweight.      Comments: Visible weight loss     Eyes:      Extraocular Movements: Extraocular movements intact.   Cardiovascular:      Rate and Rhythm: Normal rate and regular rhythm.   Pulmonary:      Effort: Pulmonary effort is normal.      Breath sounds: Normal breath sounds.      Comments: Upper lobes clear to auscultation   Remains on oxygen therapy   Abdominal:      General: Bowel sounds are normal.      Palpations: Abdomen is soft.   Musculoskeletal:      Cervical back: Neck supple.      Right lower leg: No edema.      Left lower leg: No edema.      Comments: Participates in therapy          Assessment/Plan  Problem List Items Addressed This Visit       Weakness     Remains in therapy  Monitor progress and endurance  Requires oxygen          Congestion of respiratory tract     Has resolving pneumonia  Continue Levaquin   Remains on oxygen and aerosol treatments   Has recent diagnosis lung cancer right lung. 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   Dietitian has  assessed            Medications, treatments, and labs reviewed  Continue medications and treatments as listed in EMR    CELIO Luz      Electronically Signed By: CELIO Luz   11/4/23  4:46 PM

## 2023-11-04 PROBLEM — J98.8 CONGESTION OF RESPIRATORY TRACT: Status: ACTIVE | Noted: 2023-01-01

## 2023-11-04 NOTE — ASSESSMENT & PLAN NOTE
Has resolving pneumonia  Continue Levaquin   Remains on oxygen and aerosol treatments   Has recent diagnosis lung cancer right lung. Is scheduled for chemotherapy    Has poor endurance

## 2023-11-04 NOTE — ASSESSMENT & PLAN NOTE
Intake is inconsistent  Remains on protein supplements   Visible weight loss.   Dietitian has  assessed

## 2023-11-04 NOTE — ASSESSMENT & PLAN NOTE
Is scheduled for chemotherapy treatmenst   Monitor tolerance   Provide emotional support  Monitor intake

## 2023-11-04 NOTE — PROGRESS NOTES
PROGRESS NOTE    Subjective   Chief complaint: Norah Cleaning is a 83 y.o. female who is an acute skilled patient being seen and evaluated for weakness    HPI: is sleeping comfortable. Has had visible weight loss. Remains in therapy. The therapist reports that she is progressing slowly. Nursing reports that her appetite is inconsistent and she has agreed with  the recommendation of chemotherapy.    HPI      Objective   Vital signs: 135/72-82-18-97.3*-    Physical Exam  Vitals and nursing note reviewed.   Constitutional:       General: She is sleeping. She is not in acute distress.     Appearance: She is well-groomed and underweight.      Comments: Visible weight loss     Eyes:      Extraocular Movements: Extraocular movements intact.   Cardiovascular:      Rate and Rhythm: Normal rate and regular rhythm.   Pulmonary:      Effort: Pulmonary effort is normal.      Breath sounds: Normal breath sounds.      Comments: Upper lobes clear to auscultation   Remains on oxygen therapy   Abdominal:      General: Bowel sounds are normal.      Palpations: Abdomen is soft.   Musculoskeletal:      Cervical back: Neck supple.      Right lower leg: No edema.      Left lower leg: No edema.      Comments: Participates in therapy          Assessment/Plan   Problem List Items Addressed This Visit       Weakness     Remains in therapy  Monitor progress and endurance  Requires oxygen          Congestion of respiratory tract     Has resolving pneumonia  Continue Levaquin   Remains on oxygen and aerosol treatments   Has recent diagnosis lung cancer right lung. 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   Dietitian has  assessed             Medications, treatments, and labs reviewed  Continue medications and treatments as listed in EMR    Aleida Jackson, APRN-CNP

## 2023-11-06 NOTE — LETTER
Patient: Norah Cleaning  : 1940    Encounter Date: 2023    PROGRESS NOTE    Subjective  Chief complaint: Norah Cleaning is a 83 y.o. female who is an acute skilled patient being seen and evaluated for weakness.    HPI:  Therapy has been working with the patient to improve strength and endurance with ADLs, transfers, and mobility.  Patient continues to work toward goals.  Patient is stable and has no new complaints.  Nursing staff voices no new concerns today.      Objective  Vital signs: 118/60,70,96%    Physical Exam  Constitutional:       General: She is not in acute distress.  Eyes:      Extraocular Movements: Extraocular movements intact.   Cardiovascular:      Rate and Rhythm: Normal rate and regular rhythm.   Pulmonary:      Effort: Pulmonary effort is normal.      Breath sounds: Normal breath sounds.   Abdominal:      General: Bowel sounds are normal.      Palpations: Abdomen is soft.   Musculoskeletal:      Cervical back: Neck supple.      Right lower leg: No edema.      Left lower leg: No edema.   Neurological:      Mental Status: She is alert.   Psychiatric:         Mood and Affect: Mood normal.         Behavior: Behavior is cooperative.         Assessment/Plan  Problem List Items Addressed This Visit       Weakness     Continue therapy          HTN (hypertension)     Monitor BP  Continue antihypertensives         COPD (chronic obstructive pulmonary disease) (CMS/[facility])     Has new diagnosis lung cancer.   Remains on oxygen  Oncology following         CHF (congestive heart failure) (CMS/[facility])     Continue diuretic  Monitor SOB\weight gain          Medications, treatments, and labs reviewed  Continue medications and treatments as listed in EMR    Scribe Attestation  I, Duane Ch   attest that this documentation has been prepared under the direction and in the presence of Shyla Kan MD.     Provider Attestation - Scribe documentation  All medical record entries made  by the Scribe were at my direction and personally dictated by me. I have reviewed the chart and agree that the record accurately reflects my personal performance of the history, physical exam, discussion and plan.   Shyla Kan MD      Electronically Signed By: Shyla Kan MD   11/6/23  4:14 PM

## 2023-11-06 NOTE — PROGRESS NOTES
PROGRESS NOTE    Subjective   Chief complaint: Norah Cleaning is a 83 y.o. female who is an acute skilled patient being seen and evaluated for weakness.    HPI:  Therapy has been working with the patient to improve strength and endurance with ADLs, transfers, and mobility.  Patient continues to work toward goals.  Patient is stable and has no new complaints.  Nursing staff voices no new concerns today.      Objective   Vital signs: 118/60,70,96%    Physical Exam  Constitutional:       General: She is not in acute distress.  Eyes:      Extraocular Movements: Extraocular movements intact.   Cardiovascular:      Rate and Rhythm: Normal rate and regular rhythm.   Pulmonary:      Effort: Pulmonary effort is normal.      Breath sounds: Normal breath sounds.   Abdominal:      General: Bowel sounds are normal.      Palpations: Abdomen is soft.   Musculoskeletal:      Cervical back: Neck supple.      Right lower leg: No edema.      Left lower leg: No edema.   Neurological:      Mental Status: She is alert.   Psychiatric:         Mood and Affect: Mood normal.         Behavior: Behavior is cooperative.         Assessment/Plan   Problem List Items Addressed This Visit       Weakness     Continue therapy          HTN (hypertension)     Monitor BP  Continue antihypertensives         COPD (chronic obstructive pulmonary disease) (CMS/[facility])     Has new diagnosis lung cancer.   Remains on oxygen  Oncology following         CHF (congestive heart failure) (CMS/[facility])     Continue diuretic  Monitor SOB\weight gain          Medications, treatments, and labs reviewed  Continue medications and treatments as listed in EMR    Scribe Attestation  I, Duane Ch   attest that this documentation has been prepared under the direction and in the presence of Shyla Kan MD.     Provider Attestation - Scribe documentation  All medical record entries made by the Scribe were at my direction and personally dictated by me. I have  reviewed the chart and agree that the record accurately reflects my personal performance of the history, physical exam, discussion and plan.   Shyla Kan MD

## 2023-11-13 NOTE — PROGRESS NOTES
PROGRESS NOTE    Subjective   Chief complaint: Norah Cleaning is a 83 y.o. female who is a long term care patient being seen and evaluated for monthly general medical care and follow-up.    HPI:  Patient presents for general medical care and f/u.  Patient seen and examined at bedside.  No issues per nursing.  Patient has no acute complaints. Patient has diagnosis of HTN, denies chest pain and headache.  COPD, denies sob, wheezing, cough. Patient with diagnosis of depression, denies feeling down and thoughts of harming self or others. Patient has been working in therapy to improve strength, endurance, and ADLs.  Patient continues to work toward goals.   No acute distress.      Objective   Vital signs: 96/56,68,96%    Physical Exam  Constitutional:       General: She is not in acute distress.  Eyes:      Extraocular Movements: Extraocular movements intact.   Cardiovascular:      Rate and Rhythm: Normal rate and regular rhythm.   Pulmonary:      Effort: Pulmonary effort is normal.      Breath sounds: Normal breath sounds.   Abdominal:      General: Bowel sounds are normal.      Palpations: Abdomen is soft.   Musculoskeletal:      Cervical back: Neck supple.      Right lower leg: No edema.      Left lower leg: No edema.   Neurological:      Mental Status: She is alert.   Psychiatric:         Mood and Affect: Mood normal.         Behavior: Behavior is cooperative.         Assessment/Plan   Problem List Items Addressed This Visit       Weakness     Continue therapy          HTN (hypertension)     Monitor BP  Continue antihypertensives         COPD (chronic obstructive pulmonary disease) (CMS/[facility])     Has diagnosis of lung cancer.   Remains on oxygen  Oncology following         Depression     Continue meds  Monitor mood  Currently stable          Medications, treatments, and labs reviewed  Continue medications and treatments as listed in EMR      Scribe Attestation  I, Duane Ch   attest that this  documentation has been prepared under the direction and in the presence of Shyla Kan MD.     Provider Attestation - Scribe documentation  All medical record entries made by the Scribe were at my direction and personally dictated by me. I have reviewed the chart and agree that the record accurately reflects my personal performance of the history, physical exam, discussion and plan.   Shyla Kan MD

## 2023-11-13 NOTE — ASSESSMENT & PLAN NOTE
Continue therapy    [FreeTextEntry1] : She takes Enbrel, MTX 1 mL SQ + folic acid and Sulfasalazine 2 tabs BID. \par She still takes Tylenol 2 ES twice a day.  \par \par Her knees hurt her sometimes, especially if she overdoes it.  + mornign sitffness

## 2023-11-13 NOTE — LETTER
Patient: Norah Cleaning  : 1940    Encounter Date: 2023    PROGRESS NOTE    Subjective  Chief complaint: Norah Cleaning is a 83 y.o. female who is a long term care patient being seen and evaluated for monthly general medical care and follow-up.    HPI:  Patient presents for general medical care and f/u.  Patient seen and examined at bedside.  No issues per nursing.  Patient has no acute complaints. Patient has diagnosis of HTN, denies chest pain and headache.  COPD, denies sob, wheezing, cough. Patient with diagnosis of depression, denies feeling down and thoughts of harming self or others. Patient has been working in therapy to improve strength, endurance, and ADLs.  Patient continues to work toward goals.   No acute distress.      Objective  Vital signs: 96/56,68,96%    Physical Exam  Constitutional:       General: She is not in acute distress.  Eyes:      Extraocular Movements: Extraocular movements intact.   Cardiovascular:      Rate and Rhythm: Normal rate and regular rhythm.   Pulmonary:      Effort: Pulmonary effort is normal.      Breath sounds: Normal breath sounds.   Abdominal:      General: Bowel sounds are normal.      Palpations: Abdomen is soft.   Musculoskeletal:      Cervical back: Neck supple.      Right lower leg: No edema.      Left lower leg: No edema.   Neurological:      Mental Status: She is alert.   Psychiatric:         Mood and Affect: Mood normal.         Behavior: Behavior is cooperative.         Assessment/Plan  Problem List Items Addressed This Visit       Weakness     Continue therapy          HTN (hypertension)     Monitor BP  Continue antihypertensives         COPD (chronic obstructive pulmonary disease) (CMS/[facility])     Has diagnosis of lung cancer.   Remains on oxygen  Oncology following         Depression     Continue meds  Monitor mood  Currently stable          Medications, treatments, and labs reviewed  Continue medications and treatments as listed in  EMR      Scribe Attestation  I, Duane Ch   attest that this documentation has been prepared under the direction and in the presence of Shyla Kan MD.     Provider Attestation - Scribe documentation  All medical record entries made by the Scribe were at my direction and personally dictated by me. I have reviewed the chart and agree that the record accurately reflects my personal performance of the history, physical exam, discussion and plan.   Shyla Kan MD      Electronically Signed By: Shyla Kan MD   11/13/23  5:05 PM

## 2023-11-15 NOTE — PROGRESS NOTES
"NUTRITION Assessment NOTE  Reason for Visit:  Norah Cleaning is a 83 y.o. female with squamous cell carcinoma of the right lung who presents for nutrition assessment. Patient reports most recent weight of 89 lbs as of 11/14 (not in chart) with 21# weight loss in 1 month (20% in 1 month; significant).     Visit Type: Clinical Nutrition, Oncology, Telephone Visit:  A telephone visit (audio only) between the patient (at the distant site) and the provider (at the originating site) was utilized to provide this telehealth service.    Verbal Consent for Encounter: Verbal consent was requested and obtained from patient on this date for a telehealth visit.     Patient under consideration to begin pembrolizumab. Currently lives at [place] in [facility]. Left message for RDN at facility to discuss patient receiving Boost VHC (530 kcal, 22 g protein) daily.     Will follow up with patient over the phone in 1 week.     Lab Results   Component Value Date/Time    GLUCOSE 67 (L) 10/22/2023 0559     10/22/2023 0559    K 4.5 10/22/2023 0559    CL 96 (L) 10/22/2023 0559    CO2 32 10/22/2023 0559    ANIONGAP 15 10/22/2023 0559    BUN 9 10/22/2023 0559    CREATININE 0.78 10/22/2023 0559    EGFR 76 10/22/2023 0559    CALCIUM 8.6 10/22/2023 0559    ALBUMIN 3.0 (L) 10/14/2023 0653    ALKPHOS 40 10/14/2023 0653    PROT 5.8 (L) 10/14/2023 0653    AST 12 10/14/2023 0653    BILITOT 0.5 10/14/2023 0653    ALT 4 (L) 10/14/2023 0653     Lab Results   Component Value Date/Time    VITD25 65 11/18/2020 1034       Anthropometrics:  Anthropometrics  Height: 157.5 cm (5' 2.01\")  Weight: (!) 42.3 kg (93 lb 4.8 oz)  BMI (Calculated): 17.06  IBW/kg (Dietitian Calculated): 50.4 kg  Percent of IBW: 83.9 %  Weight Change  Weight History / % Weight Change: Patient with significant weigth loss in past month. Reports UBW of 101 lb, but range up to 118 lb.  Significant Weight Loss: Yes  Interpretation of Weight Loss: >5% in 1 month        Wt " "Readings from Last 10 Encounters:   11/15/23 (!) 42.3 kg (93 lb 4.8 oz)   11/02/23 (!) 42.3 kg (93 lb 4.8 oz)   10/27/23 (!) 42.3 kg (93 lb 4.1 oz)   10/19/23 50.1 kg (110 lb 7.2 oz)   10/02/23 45.2 kg (99 lb 10.4 oz)   09/04/22 45 kg (99 lb 3.3 oz)   03/16/22 48.9 kg (107 lb 12.8 oz)   03/14/22 48.8 kg (107 lb 9.6 oz)   02/23/22 50.9 kg (112 lb 3.2 oz)   02/08/22 54.5 kg (120 lb 3.2 oz)        Food And Nutrient Intake:  Food and Nutrient History  Food and Nutrient History: Patient currently lives at a nursing home and has 3 meals tressa to her room daily. Sometimes does not have the energy to get her breakfast from the door. Eats 2-3 meals daily. Has been noticing appetite increasing in past week. Experiences nausea on days she has appointments due to stress.  Energy Intake: Fair 50-75 %  Fluid Intake: Failed thin liquids.  Food Allergy: NKFA.  GI Symptoms: constipation  GI Symptoms greater than 2 weeks: yes (No bowel movement for two weeks. Most recent bowel movement today. Will begin on milk of magnesia and prune juice tomorrow.)  Oral Problems: denies     Food Intake  Meal 1: Sometimes misses. Typically cereal with fruit and canned peaches.  Meal 2: [place] with fruit and a cookie  Meal 3: Similar to lunch  Snacks: No snacks    Food Supplement Intake  Oral Nutrition Supplements: Boost (1 per day with medications)    Nutrition Focused Physical Exam:  Subcutaneous Fat Loss  Orbital Fat Pads: Defer  Buccal Fat Pads: Defer  Triceps: Defer  Ribs: Defer  Muscle Wasting  Temporalis: Defer  Pectoralis (Clavicular Region): Defer  Deltoid/Trapezius: Defer  Interosseous: Defer  Trapezius/Infraspinatus/Supraspinatus (Scapular Region): Defer  Quadriceps: Defer  Gastrocnemius: Defer        Energy Needs  Calculated Energy Needs Using Equations  Height: 157.5 cm (5' 2.01\")  Estimated Energy Needs  Total Energy Estimated Needs (kCal): 1500 kCal  Method for Estimating Needs: 30 kcal/kg IBW  Estimated Fluid Needs  Total Fluid " Estimated Needs (mL): 1500 mL  Method for Estimating Needs: 1 ml/kcal or per team  Estimated Protein Needs  Total Protein Estimated Needs (g): 60 g  Method for Estimating Needs: 1.2 g/kg IBW        Nutrition Diagnosis   Malnutrition Diagnosis  Patient has Malnutrition Diagnosis: Yes  Diagnosis Status: New  Malnutrition Diagnosis: Severe malnutrition related to chronic disease or condition  As Evidenced by: 20% weight loss in 1 month and less than 75% intake for 1 month.  Nutrition Diagnosis  Patient has Nutrition Diagnosis: Yes  Diagnosis Status (1): New  Nutrition Diagnosis 1: Increased nutrient needs  Related to (1): cancer diagnosis  As Evidenced by (1): 20% unintended weight loss.    Nutrition Interventions/Recommendations   Food and Nutrition Delivery  Meals & Snacks: Energy-modified diet  Goals: Recommend 3 meals per day.  Medical Food Supplement: Boost Very High Calorie  Goals: Recommend 1x/day        There are no Patient Instructions on file for this visit.    Nutrition Monitoring and Evaluation   Food/Nutrient Related History Monitoring  Monitoring and Evaluation Plan: Energy intake, Fluid intake  Energy Intake: Estimated energy intake  Criteria: 24 hour recall  Fluid Intake: Estimated fluid intake  Criteria: 24 hour recall  Biochemical Data, Medical Tests and Procedures  Monitoring and Evaluation Plan: Nutritional anemia profile  Nutritional Anemia Profile: Hemoglobin        Time Spent  Prep time on day of patient encounter: 10 minutes  Time spent directly with patient, family or caregiver: 20 minutes  Additional Time Spent on Patient Care Activities: 5 minutes  Documentation Time: 15 minutes  Other Time Spent: 0 minutes  Total: 50 minutes

## 2023-11-17 NOTE — TELEPHONE ENCOUNTER
Spoke to patient and her daughter by phone. Patient is clear she would like to try immunotherapy. Discussed risks previously, and again with her daughter today including that immunotherapy is often well tolerated but can have unpredictable side effects and rarely more serious ones.    Will plan follow-up with me next week to finalize consent and ensure she remains appropriate for treatment. Treatment likely following week when authorized.     She is also endorsing more pain - unclear if arthritic (seems like opposite hip from where cancer seen) but may also be cancer-related. She is taking oxycodone unsure of dose. Will review meds next week to try and optimize pain control as well

## 2023-11-17 NOTE — PROGRESS NOTES
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 particularly with response to airway obstruction/post-obstructive PNA    Follow-up TBP      Susana Sauer MD  [facility]
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
## 2023-11-26 PROBLEM — R52 PAIN: Status: ACTIVE | Noted: 2023-01-01

## 2023-11-26 NOTE — CONSULTS
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 ordered/pertinent for this encounter.

## 2023-11-26 NOTE — ED TRIAGE NOTES
Pt fell from standing. Pt denies LOC. Pt states that she has right hip pain, right shoulder pain, and left flank pain
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
## 2023-11-27 PROBLEM — S42.001A CLOSED FRACTURE OF RIGHT CLAVICLE: Status: ACTIVE | Noted: 2023-01-01

## 2023-11-27 NOTE — PROGRESS NOTES
Pharmacy Medication History Review    Norah Cleaning is a 83 y.o. female admitted for Pain. Pharmacy reviewed the patient's bhkkz-us-[id number] medications and allergies for accuracy.    The list below reflects the updated PTA list. Comments regarding how patient may be taking medications differently can be found in the Admit Orders Activity  Prior to Admission Medications   Prescriptions Last Dose Informant Patient Reported? Taking?   DULoxetine (Cymbalta) 30 mg DR capsule  Other Yes Yes   Sig: Take 1 capsule (30 mg) by mouth once daily. Do not crush or chew.   acetaminophen (Tylenol) 325 mg tablet  Other Yes Yes   Sig: Take 2 tablets (650 mg) by mouth every 4 hours if needed for moderate pain (4 - 6) or mild pain (1 - 3).   acetaminophen (Tylenol) 325 mg tablet  Other Yes Yes   Sig: Take 2 tablets (650 mg) by mouth 3 times a day.   acetaminophen (Tylenol) 650 mg suppository  Other Yes Yes   Sig: Insert 1 suppository (650 mg) into the rectum every 4 hours if needed for mild pain (1 - 3) or fever (temp greater than 38.0 C).   albuterol 90 mcg/actuation inhaler  Other Yes Yes   Sig: Inhale 2 puffs every 6 hours if needed.   aspirin 81 mg chewable tablet  Other Yes Yes   Sig: Chew 1 tablet (81 mg) once daily.   atorvastatin (Lipitor) 40 mg tablet  Other Yes Yes   Sig: Take 1 tablet (40 mg) by mouth once daily at bedtime.   benzonatate (Tessalon) 100 mg capsule  Other Yes Yes   Sig: Take 1 capsule (100 mg) by mouth 3 times a day as needed for cough. Do not crush or chew.   bisacodyl (Dulcolax, bisacodyl,) 10 mg suppository  Other Yes Yes   Sig: Insert 1 suppository (10 mg) into the rectum 1 time if needed for constipation (if no results from Milk of mag).   cholecalciferol (Vitamin D-3) 50 MCG (2000 UT) tablet  Other Yes Yes   Sig: Take 1 tablet (50 mcg) by mouth 2 times a day.   diclofenac sodium (Voltaren) 1 % gel gel  Other Yes Yes   Sig: Apply 1 Application topically 2 times a day as needed (pain).    fluticasone propion-salmeteroL (Advair Diskus) 500-50 mcg/dose diskus inhaler  Other Yes Yes   Sig: Inhale 1 puff 2 times a day. Rinse mouth with water after use to reduce aftertaste and incidence of candidiasis. Do not swallow.   gabapentin (Neurontin) 100 mg capsule  Other Yes Yes   Sig: Take 2 capsules (200 mg) by mouth 3 times a day.   ipratropium-albuteroL (Duo-Neb) 0.5-2.5 mg/3 mL nebulizer solution  Other Yes Yes   Sig: Take 3 mL by nebulization every 6 hours if needed for wheezing.   lactobacillus acidophilus (acidophilus-pectin, citrus) tablet tablet  Other Yes Yes   Sig: Take 1 tablet by mouth twice a day.   lidocaine 4 % patch  Other Yes Yes   Sig: Place 1 patch on the skin once daily. Remove & discard patch within 12 hours or as directed by MD. Apply to right knee   losartan (Cozaar) 50 mg tablet  Other Yes Yes   Sig: Take 1 tablet (50 mg) by mouth once daily.   magnesium hydroxide (Milk of Magnesia) 400 mg/5 mL suspension  Other Yes Yes   Sig: Take 30 mL by mouth once daily as needed for constipation (for no BM in 3 days).   melatonin 5 mg tablet  Other Yes Yes   Sig: Take 1 tablet (5 mg) by mouth once daily at bedtime.   metoclopramide (Reglan) 10 mg tablet  Other No Yes   Sig: Take 0.5 tablets (5 mg) by mouth 2 times a day as needed (nausea).   metoprolol succinate XL (Toprol-XL) 25 mg 24 hr tablet  Other Yes Yes   Sig: Take 0.5 tablets (12.5 mg) by mouth once daily. Do not crush or chew.   mineral oil enema  Other Yes Yes   Sig: Insert 133 mL (1 enema) into the rectum every 24 (twenty four) hours if needed for constipation.   montelukast (Singulair) 10 mg tablet  Other Yes Yes   Sig: Take 1 tablet (10 mg) by mouth once daily.   multivitamin with minerals (Multiple Vitamin-Minerals) tablet  Other Yes Yes   Sig: Take 1 tablet by mouth once daily.   olopatadine (Patanol) 0.1 % ophthalmic solution  Other Yes Yes   Sig: Administer 1 drop into both eyes 3 times a day.   ondansetron (Zofran) 4 mg tablet   Other Yes Yes   Sig: Take 1 tablet (4 mg) by mouth every 8 hours if needed for nausea or vomiting.   oxyCODONE-acetaminophen (Percocet)  mg tablet  Other No Yes   Sig: Take 1 tablet by mouth every 4 hours if needed for severe pain (7 - 10).   oxygen (O2) gas therapy  Other Yes Yes   Sig: Inhale as instructed daily at bedtime.   pantoprazole (ProtoNix) 40 mg EC tablet  Other Yes Yes   Sig: Take 1 tablet (40 mg) by mouth once daily in the morning. Take before meals. Do not crush, chew, or split.   polyethylene glycol (Glycolax, Miralax) packet Not Taking  No No   Sig: Take 17 g by mouth once daily.   Patient not taking: Reported on 11/26/2023   sennosides (Senokot) 8.6 mg tablet  Other Yes Yes   Sig: Take 1 tablet (8.6 mg) by mouth 2 times a day.   sildenafil (Revatio) 20 mg tablet  Other No Yes   Sig: DO NOT GIVE FOR BLOOD PRESSURE LESS THAN 110  CHECK WITH YOUR DOCTOR IF YOU NEED TO CONTINUE THIS   tiotropium (Spiriva Respimat) 2.5 mcg/actuation inhaler  Other Yes Yes   Sig: Inhale 2 puffs once daily.   torsemide (Demadex) 10 mg tablet Not Taking Other No No   Sig: Take 1 tablet (10 mg) by mouth once daily. For edema Do not start before October 23, 2023.   Patient not taking: Reported on 11/26/2023   zinc oxide 20 % paste  Other Yes Yes   Sig: Apply 1 Application topically 2 times a day.      Facility-Administered Medications: None        The list below reflects the updated allergy list. Please review each documented allergy for additional clarification and justification.  Allergies  Reviewed by Ewa Rain PharmD on 11/26/2023   No Known Allergies         Patient declines M2B at discharge. Pharmacy has been updated to N/A - pt from [facility].    Sources used to complete the med history include   [facility] Medication Review Report generated 11/26/23      Below are additional concerns with the patient's PTA list.  Not taking torsemide or Miralax    Madonna Metz PharmD   Transitions of  Care Pharmacist  [facility]  Please reach out via Secure Chat for questions, or if no response call [facility] or [facility]

## 2023-11-27 NOTE — PROGRESS NOTES
Norah Cleaning is a 83 y.o. female on day 1 of admission presenting with Pain.    Subjective   Seen at bedside this morning. She is sleepy on exam, answers questions appropriately. Continues to endorse pain 9-10/10 in legs, hips, and right shoulder. Discussed pain regimen with her today. Admits to eating and drinking well. I discussed with her that her Pembro will have to be delayed while she is inpatient. Denies any HA, dizziness/lightheadedness, fevers/chills, cough, SOB, CP, palpitations, abdominal pain, n/v/d/c, melena, dysuria, urgency/frequency, hematuria, numbness/tingling, bruising/bleeding or rashes     Objective     Physical Exam  Vitals reviewed.   Constitutional:       General: She is sleeping.      Appearance: Normal appearance. She is underweight.   HENT:      Head: Normocephalic and atraumatic.      Nose: Nose normal.      Mouth/Throat:      Mouth: Mucous membranes are moist.      Pharynx: Oropharynx is clear.   Eyes:      Extraocular Movements: Extraocular movements intact.      Pupils: Pupils are equal, round, and reactive to light.   Cardiovascular:      Rate and Rhythm: Normal rate and regular rhythm.      Pulses: Normal pulses.      Heart sounds: Normal heart sounds.   Pulmonary:      Effort: Pulmonary effort is normal.      Breath sounds: Normal breath sounds.      Comments: On 3.5 L home O2  Abdominal:      General: Bowel sounds are normal.      Palpations: Abdomen is soft.   Musculoskeletal:         General: Normal range of motion.   Skin:     General: Skin is warm.   Neurological:      General: No focal deficit present.      Mental Status: She is oriented to person, place, and time and easily aroused. Mental status is at baseline.      Motor: Weakness present.   Psychiatric:         Mood and Affect: Mood normal.         Behavior: Behavior normal.       Last Recorded Vitals  Blood pressure 145/72, pulse 74, temperature 36.4 °C (97.5 °F), temperature source Temporal, resp. rate 18, height  "1.549 m (5' 1\"), weight 45 kg (99 lb 3.3 oz), SpO2 97 %.  Intake/Output last 3 Shifts:  No intake/output data recorded.    Relevant Results  Scheduled medications  aspirin, 81 mg, oral, Daily  atorvastatin, 40 mg, oral, Nightly  DULoxetine, 30 mg, oral, Daily  ferrous sulfate (325 mg ferrous sulfate), 65 mg of iron, oral, Daily  fluticasone, 1 spray, Each Nostril, Daily  gabapentin, 200 mg, oral, TID  heparin (porcine), 5,000 Units, subcutaneous, q8h JERSON  ketorolac, 15 mg, intravenous, q6h  lidocaine, 1 patch, transdermal, Daily  melatonin, 5 mg, oral, Nightly  pantoprazole, 40 mg, oral, Daily before breakfast  polyethylene glycol, 17 g, oral, Daily  sennosides-docusate sodium, 2 tablet, oral, BID    Continuous medications     PRN medications  PRN medications: albuterol, benzonatate, clonazePAM, diclofenac sodium, HYDROmorphone, metoclopramide, ondansetron, oxyCODONE     Assessment/Plan   Principal Problem:    Pain  Active Problems:    Weakness    HTN (hypertension)    COPD (chronic obstructive pulmonary disease) (CMS/HCC)    Squamous cell carcinoma of right lung (CMS/HCC)    Obstructive sleep apnea    Closed fracture of right clavicle    Norah Cleaning is an 83 year-old female with PMH of newly dx SCC of the R lung (yet to start tx, planned to start single agent Pembro 11/27), COPD/chronic hypoxic respiratory failure on 3.5L NC baseline, SHU, HTN, OA, CAD, PAD, aortic stenosis, pulmonary HTN, CHF, AI, depression, T4 compression fx of L4 from fall, and GERD, who presents today after she tripped and fell out of bed at her nursing facility. She denies hitting her head or losing consciousness. Xray of clavicle 11/26 showing acute fx of distal R clavicle. Ortho consulted, no acute intervention, RANJIT LEE, placed in sling for comfort. Admitted for pain management. On admit started oxycodone and IV dilaudid for pain management. DC back to [facility] pending improvement in pain.     # Clavicle Fracture-related pain s/p fall  - " 11/26 pt fell at nursing home, no head trauma  - CT head (11/26): negative for acute intracranial hemorrhage or skull fx  - Xray ankle/hip/shoulder/clavicle: notable for acute fx of distal clavicle  - Ortho consulted in ED, rec no acute surgical intervention and placed pt in a sling, NWB RUE  - Continue home percocet sub as oxycodone 10mg q4hrs PRN moderate pain  - Started IV dilaudid 0.5mg q3hrs PRN severe pain  - Started IV Toradol 15mg q6hrs scheduled x3 days (11/26-11/29) with protonix for PPI prophy  - Lidocaine patches x2. Continue home Voltaren gel PRN  - Bowel regimen for opioid-induced constipation with DocuSenna 2tabs BID and Miralax daily     # SCC R Lung (stage IVB, cT2, cN2, cM1c)  - Primary oncologist: Dr. Susana Sauer  - 10/1/23: Presented to ER with several days of worsening dyspnea and some difficulty swallowing. Found to have RUL mass  - 10/5/23: CT guided biopsy of RUL mass with squamous cell carcinoma, keratinizing type  - 10/13/23: Readmitted due to dyspnea, found to have troponin elevation into the several hundreds, likely post-obstructive PNA, possible volume overload  - 11/10/23: PET with concern for metastatic disease in the bones   - Hospice vs treatment discussed at clinic visit 11/22/23 and pt elected to continue with Pembro. Has yet to start, was planned to start 11/27. Reached out to outpatient RN and MELVINA on 11/27, will work on rescheduling appointment for next week      # HTN/ CAD/ PAD/ Aortic Stenosis/ Pulm HTN  - s/p carotid endarterectomy 2013, cardiac cath 2014  - Echo (10/14/23): EF 60-65%, mod LV hypertrophy, mod-severe RVSP, abnormal AA, severe aortic valve stenosis  - Pt unsure of home meds, resides in facility  - Per MR takes ASA 81mg daily, Diltiazem 360mg daily, hydrochlorothiazide 25mg daily, Metop succs 25mg daily, Sildenafil (multiple doses in MR) and torsemide 10mg daily  - Holding all above meds except ASA 81mg as unclear what pt takes at facility and BP is 90s-low 100s  systolic  - Cards consulted during recent hospital stay; was on/off diuretics and bp meds d/t soft Bps at that time as well  - DC sum from 10/22 includes metop succs 25mg daily, sildenafil 20mg daily, torsemide 10mg daily-- could consider re starting this regimen if BP rises  - Continue home Lipitor 40mg daily  - FUV with Dr. Holbrook requested/pending     # COPD/ chronic hypoxic resp failure  - Continue home 3.5L NC, SpO2 remains stable  - Continue home flonase 1 spray each nostril daily  - Continue home Albuterol PRN and Tessalon 100mg TID PRN cough     # Depression  # Insomnia  - Continue home Duloxetine 60mg daily and Klonopin 0.5mg nightly PRN anxiety  - Continue home Melatonin 5mg nightly     # OA  - Takes Fosamax 70mg every 7 days, unsure of last dose so held on admit     # Recent nausea/dry heaves  - Per last onc note, seems to be with car rides and pill intake. Thought to be cancer related  - Outpt was started on Reglan 5mg BID PRN- will continue on admit (notes that has hx of tremor and if any c/f rigidity or changes in movement disorder would dc)  - Continue Zofran 4mg q8hrs PRN nausea/vomiting     DVT prophy: Heparin subcutaneous, SCDs, encourage ambulation     DISPO:  - DNR, confirmed on admission  - Access: PIV  - DC pending improvement in pain  - NOK: Gokul Haskins (daughter): [phone]  - Needs Pembro apt rescheduled. Messaged Susana Giordano RN and Adela Guzman, JOIE 11/27 for appointment. Date pending.       I spent >60 minutes in the professional and overall care of this patient.    Assessment and plan discussed with attending physician Dr. Minh Brown, APRN-CNP

## 2023-11-27 NOTE — CONSULTS
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 follow.  Please contact us for additional questions or concerns.    SIGNATURE: JOIE Galicia-ERVIN  PAGER/CONTACT:  Contact information:  [facility]  Monday-Friday 8 AM-5 PM  Epic Secure chat or pager [phone].  After hours and weekends:  pager [phone]

## 2023-11-27 NOTE — PROGRESS NOTES
11/27/23 6915   Discharge Planning   Living Arrangements Other (Comment)   Support Systems Children   Assistance Needed Pt needs assistance with ADLS and mobility   Type of Residence Nursing home/residential care   Do you have animals or pets at home? No   Home or Post Acute Services Post acute facilities (Rehab/SNF/etc)   Type of Post Acute Facility Services Long term care   Patient expects to be discharged to: Long term care   Does the patient need discharge transport arranged? Yes   RoundTrip coordination needed? Yes   Has discharge transport been arranged? No   Financial Resource Strain   How hard is it for you to pay for the very basics like food, housing, medical care, and heating? Not hard   Housing Stability   In the last 12 months, was there a time when you were not able to pay the mortgage or rent on time? N   In the last 12 months, how many places have you lived? 1   In the last 12 months, was there a time when you did not have a steady place to sleep or slept in a shelter (including now)? N   Transportation Needs   In the past 12 months, has lack of transportation kept you from medical appointments or from getting medications? no   In the past 12 months, has lack of transportation kept you from meetings, work, or from getting things needed for daily living? No     Pt with SCC of the right lung was admitted for a fractured clavicle after a fall at her long term care facility.  She resides at [facility] in [facility] and has been there for two years.  She is on 3.5 L 02 at baseline. Discussed discharge planning with the pt's daughter (pt was sleeping.)  If the pt was appropriate for hospice, she prefers to move her mother to a facility closer to her but if trying immunotherapy, would keep her at [facility] (where the pt would like to stay.)  Per the team, her immunotherapy will be rescheduled (was due today.)  The patient's daughter was given a list of LTC facilities in case she does choose to have  her moved.

## 2023-11-28 NOTE — PROGRESS NOTES
Physical Therapy                 Therapy Communication Note    Patient Name: Norah Cleaning  MRN: [id number]  Today's Date: 11/28/2023     Discipline: Physical Therapy    Missed Visit Reason: Missed Visit Reason: Other (Comment) (RN states to hold therapy at this time due to increased pain level and glucose of 64. Will re-attempt as able and medically appropriate)    Missed Time: Attempt

## 2023-11-28 NOTE — CARE PLAN
Problem: Skin  Goal: Decreased wound size/increased tissue granulation at next dressing change  Outcome: Progressing  Goal: Participates in plan/prevention/treatment measures  Outcome: Progressing  Goal: Prevent/manage excess moisture  Outcome: Progressing  Goal: Prevent/minimize sheer/friction injuries  Outcome: Progressing  Goal: Promote/optimize nutrition  Outcome: Progressing  Goal: Promote skin healing  Outcome: Progressing       The clinical goals for the shift include Patient will remain free from injuries and falls throughout shift      End of shift:  VSS, patient remained afebrile during shift. Patient remained free from injuries and falls throughout shift. Patient complained of pain in right arm and left ribs, given PRN pain medications per orders. No complaints of n/v/d. Patient having trouble with swallowing, placed on NPO diet until barium swallow eval is completed. Patient started on continuous D5-.45% NS. Patient received CXR. Patient safe.

## 2023-11-28 NOTE — NURSING NOTE
V/s stable over night. She continues to c/o severe 10/10 pain in her right shoulder and her left lateral chest. She declined repositioning most of the night d/t pain. She was incontinent of urine overnight.

## 2023-11-28 NOTE — PROGRESS NOTES
Norah Cleaning is a 83 y.o. female on day 2 of admission presenting with Pain.    Subjective   Seen at bedside this morning. She is sleepy on exam, answers questions appropriately. Continues to endorse pain 9-10/10 in neck, legs, hips, and bilateral shoulders. Discussed pain regimen with her today. She states she does not have much of an appetite. She is in a considerable amount of pain and does not want to get out of bed until pain is more controlled. Denies any HA, dizziness/lightheadedness, fevers/chills, cough, SOB, CP, palpitations, abdominal pain, n/v/d/c, urgency/frequency, numbness/tingling.    Objective     Physical Exam  Vitals reviewed.   Constitutional:       General: She is sleeping.      Appearance: Normal appearance. She is underweight.      Comments: Drowsy, cachectic, occasionally grimacing, cooperative and participating in interview   HENT:      Head: Normocephalic and atraumatic.      Nose: Nose normal.      Mouth/Throat:      Mouth: Mucous membranes are moist.      Dentition: Has dentures.      Pharynx: Oropharynx is clear.   Eyes:      Extraocular Movements: Extraocular movements intact.      Pupils: Pupils are equal, round, and reactive to light.   Cardiovascular:      Rate and Rhythm: Normal rate and regular rhythm.      Pulses: Normal pulses.      Heart sounds: Normal heart sounds.   Pulmonary:      Effort: Pulmonary effort is normal.      Breath sounds: Normal breath sounds.      Comments: On 3.5 L home O2  Abdominal:      General: Bowel sounds are normal.      Palpations: Abdomen is soft.   Musculoskeletal:         General: Normal range of motion.   Skin:     General: Skin is warm.   Neurological:      General: No focal deficit present.      Mental Status: She is oriented to person, place, and time and easily aroused. Mental status is at baseline.      Motor: Weakness present.   Psychiatric:         Mood and Affect: Mood normal.         Behavior: Behavior normal.       Last Recorded  "Vitals  Blood pressure 166/66, pulse 74, temperature 37 °C (98.6 °F), temperature source Temporal, resp. rate 18, height 1.549 m (5' 1\"), weight (!) 44.6 kg (98 lb 5.2 oz), SpO2 92 %.  Intake/Output last 3 Shifts:  No intake/output data recorded.    Relevant Results  Scheduled medications  acetaminophen, 650 mg, oral, q6h  aspirin, 81 mg, oral, Daily  atorvastatin, 40 mg, oral, Nightly  DULoxetine, 30 mg, oral, Daily  ferrous sulfate (325 mg ferrous sulfate), 65 mg of iron, oral, Daily  fluticasone, 1 spray, Each Nostril, Daily  gabapentin, 200 mg, oral, TID  heparin (porcine), 5,000 Units, subcutaneous, q8h JERSON  lidocaine, 1 patch, transdermal, Daily  melatonin, 5 mg, oral, Nightly  pantoprazole, 40 mg, oral, Daily before breakfast  polyethylene glycol, 17 g, oral, Daily  sennosides-docusate sodium, 2 tablet, oral, BID    Continuous medications     PRN medications  PRN medications: albuterol, benzonatate, dextrose 10 % in water (D10W), dextrose, diclofenac sodium, glucagon, metoclopramide, ondansetron, oxyCODONE, oxyCODONE     Assessment/Plan   Principal Problem:    Pain  Active Problems:    Weakness    HTN (hypertension)    COPD (chronic obstructive pulmonary disease) (CMS/HCC)    Squamous cell carcinoma of right lung (CMS/HCC)    Obstructive sleep apnea    Closed fracture of right clavicle    Norah Cleaning is an 83 year-old female with PMH of newly dx SCC of the R lung (yet to start tx, planned to start single agent Pembro 11/27), COPD/chronic hypoxic respiratory failure on 3.5L NC baseline, SHU, HTN, OA, CAD, PAD, aortic stenosis, pulmonary HTN, CHF, AI, depression, T4 compression fx of L4 from fall, and GERD, who presents today after she tripped and fell out of bed at her nursing facility. She denies hitting her head or losing consciousness. Xray of clavicle 11/26 showing acute fx of distal R clavicle. Ortho consulted, no acute intervention, NWB RUE, placed in sling for comfort. Admitted for pain " management. On admit started oxycodone and IV dilaudid for pain management. Supportive onc consulted 11/27 for additional pain recommendations. DC back to ICF pending improvement in pain.     # Clavicle Fracture-related pain s/p fall  - 11/26 pt fell at nursing home, no head trauma  - CT head (11/26): negative for acute intracranial hemorrhage or skull fx  - Xray ankle/hip/shoulder/clavicle: notable for acute fx of distal clavicle  - Ortho consulted in ED, rec no acute surgical intervention and placed pt in a sling, NWB RUE  - Supportive onc consulted 11/27; appreciate recs   - Continue home percocet sub as oxycodone 5mg or 10mg q4hrs PRN moderate to severe pain  - Started IV dilaudid 0.5mg q6hrs PRN breakthrough for severe pain 11/28   - Discontinued IV Toradol 15mg 11/27 per supp onc   - Started Tylenol 650mg Q6 for pain   - Lidocaine patches x2. Continue home Voltaren gel PRN  - Bowel regimen for opioid-induced constipation with DocuSenna 2tabs BID and Miralax daily  - PT/OT recs pending      # SCC R Lung (stage IVB, cT2, cN2, cM1c)  - Primary oncologist: Dr. Susana Sauer  - 10/1/23: Presented to ER with several days of worsening dyspnea and some difficulty swallowing. Found to have RUL mass  - 10/5/23: CT guided biopsy of RUL mass with squamous cell carcinoma, keratinizing type  - 10/13/23: Readmitted due to dyspnea, found to have troponin elevation into the several hundreds, likely post-obstructive PNA, possible volume overload  - 11/10/23: PET with concern for metastatic disease in the bones   - Hospice vs treatment discussed at clinic visit 11/22/23 and pt elected to continue with Pembro. Has yet to start, was planned to start 11/27. Reached out to outpatient RN and MELVINA on 11/27, will work on rescheduling appointment for next week      # HTN/ CAD/ PAD/ Aortic Stenosis/ Pulm HTN  - s/p carotid endarterectomy 2013, cardiac cath 2014  - Echo (10/14/23): EF 60-65%, mod LV hypertrophy, mod-severe RVSP, abnormal  AA, severe aortic valve stenosis  - Pt unsure of home meds, resides in facility  - Per MR takes ASA 81mg daily, Diltiazem 360mg daily, hydrochlorothiazide 25mg daily, Metop succs 25mg daily, Sildenafil (multiple doses in MR) and torsemide 10mg daily  - Holding all above meds except ASA 81mg as unclear what pt takes at facility and BP is 90s-low 100s systolic  - Cards consulted during recent hospital stay; was on/off diuretics and bp meds d/t soft Bps at that time as well  - DC sum from 10/22 includes metop succs 25mg daily, sildenafil 20mg daily, torsemide 10mg daily-- could consider re starting this regimen if BP rises  - Restarted Metop succinate 25mg daily 11/28 as blood pressure is stable and increasing   - Continue home Lipitor 40mg daily  - FUV with Dr. Holbrook requested/pending     # COPD/ chronic hypoxic resp failure  - Continue home 3.5L NC, SpO2 remains stable  - Continue home flonase 1 spray each nostril daily  - Continue home Albuterol PRN and Tessalon 100mg TID PRN cough    # Hypoglycemia   - Hypoglycemia order set in place  - Regular diet   - Encourage PO intake   - Ensure high protein ordered with each meal      # Depression  # Insomnia  - Continue home Duloxetine 60mg daily and Klonopin 0.5mg nightly PRN anxiety  - Continue home Melatonin 5mg nightly     # OA  - Takes Fosamax 70mg every 7 days, unsure of last dose so held on admit     # Recent nausea/dry heaves  - Per last onc note, seems to be with car rides and pill intake. Thought to be cancer related  - Outpt was started on Reglan 5mg BID PRN- will continue on admit (notes that has hx of tremor and if any c/f rigidity or changes in movement disorder would dc)  - Continue Zofran 4mg q8hrs PRN nausea/vomiting, changed to Q6 on 11/27 per supp onc      DVT prophy: Heparin subcutaneous, SCDs, encourage ambulation     DISPO:  - DNR, confirmed on admission  - Access: PIV  - DC pending improvement in pain  - NOK: Gokul Haskins (daughter): [phone]  -  Needs Pembro apt rescheduled. Messaged Ssuana Giordano RN and Adela Guzman, APRN 11/27 for appointment. Date pending.     I spent >60 minutes in the professional and overall care of this patient.    Assessment and plan discussed with attending physician Dr. Minh Brown, APRN-CNP

## 2023-11-28 NOTE — PROGRESS NOTES
"Speech-Language Pathology        Inpatient Speech-Language Pathology Clinical Swallow Evaluation    Patient Name: Norah Cleaning  MRN: [id number]  Today's Date: 11/28/2023   Time Calculation  Start Time: 1330  Stop Time: 1355  Time Calculation (min): 25 min       Recommendations:  NPO  May consider alternative means nutrition/hydration  Updated instrumental assessment when pt able to comfortably participate  Frequent oral care  May allow ice chips x2-3 with oral care when sitting fully upright    Assessment:   Clinical swallow evaluation completed. Pt with known h/o \"severe pharyngeal dysphagia with silent aspiration of thin and nectar thick liquids\" per MBSS completed 10/6/23; at that time pt was recommended regular solids and honey thick liquids. Since this pt has had multiple hospitalizations- it is unclear what diet texture pt was consuming at facility and hospital since completion of MBSS. At the present, pt is demonstrating strong cough response to thin liquids, highly c/f aspiration and ongoing dysphagia. Updated instrumental swallow evaluation (MBSS vs FEES) required to inform appropriate diet recommendation however at the present pt's pain and lethargy is a barrier to completion of further evaluation and/or safe consumption of any diet texture. Plan to assess next date given improvement in pain/lethargy.     Plan:  Inpatient/Swing Bed or Outpatient: Inpatient  SLP Plan: Skilled SLP  SLP Frequency: 1x per week  Duration: 2 weeks  SLP Discharge Recommendations: Continue skilled SLP services at the next level of care  Discussed POC: Patient, Nursing, Physician  Discussed Risks/Benefits: Yes  Patient/Caregiver Agreeable: Yes  SLP - OK to Discharge: Yes    Goals: Patient/Family will verbalize/demonstrate comprehension of dysphagia education, strategies, recommendations and POC.    Subjective     History and Physical:    Norah Cleaning is an 83 year-old female with PMH of newly dx SCC of the R lung (yet " "to start tx, planned to start single agent Pembro 11/27), COPD/chronic hypoxic respiratory failure on 3.5L NC baseline, SHU, HTN, OA, CAD, PAD, aortic stenosis, pulmonary HTN, CHF, AI, depression, T4 compression fx of L4 from fall, and GERD, who presents today after she tripped and fell out of bed at her nursing facility. She denies hitting her head or losing consciousness. Xray of clavicle 11/26 showing acute fx of distal R clavicle. Ortho consulted, no acute intervention, NWOLIVER LEE, placed in sling for comfort. Admitted for pain management. On admit started oxycodone and IV dilaudid for pain management. Supportive onc consulted 11/27 for additional pain recommendations. DC back to ICF pending improvement in pain.     Relevant SLP Hx:  MBSS completed 10/6/23 at [facility]  \"Pt presents with severe  pharyngeal dysphagia, silent aspiration with thin and nectar/mildly  thick liquids.    Oral phase: Anatomy appears WFL. Loss of bolus into pharynx prior to  swallow with solids and all liquids.     Pharyngeal phase: Anatomy  appears WFL. Loss of bolus to valleculae prior to swallow with solids  and all liquids; loss to pyriform sinuses with  moderately/honey-thick, mildly/nectar-thick liquids. Residue noted at  valleculae and posterior pharyngeal wall (level of C3-C4) with all  consistencies. Laryngeal penetration with mildly/nectar-thick liquids  and thin liquids by cup, with trace residue that silently dripped to  TVF to become aspirated. Brendon silent laryngeal aspiration noted with  thin liquids by straw.     Esophageal phase: Adequate UES opening with  appropriate clearance of bolus trials. Mild residue noted inferior to  UES at level of C5-C6.\"    Baseline Assessment:  Respiratory Status: Oxygen via nasal cannula  Behavior/Cognition: Lethargic, Requires mod-max cueing for participation as she was falling asleep, oriented x4  Patient Positioning: Partially/Semi Reclined in bed; unable to tolerate sitting fully " "upright 2/2 pain     Upon prompting pt reported she has been on a regular texture diet. She recalled participating in an MBS but doesn't recall the results. When prompted regarding intake since admission pt reported that she \"can't eat, its too painful\" and repeatedly stated \"I can't\".     Per RN, pt has not eaten and has only consumed thin liquids and pills during admit. She reported pt to have wet congested coughing directly after taking pills.     Pain: Pt endorsed significant pain, pointing at numerous spots across clavicle, hips, and ribs.           Objective     Oral/Motor Assessment:  Oral Hygiene: Lingual xerostomia, small amount of reddish tan dried secretions on corners of mouth  Dentition: Adequate/Natural  Oral Motor: Within Functional Limits  Vocal Quality: Exceptions to WFL  Vocal Quality Impairment: Hoarse  Intelligibility: Intelligible      Clinical Observations:  Consistencies Trialed: Yes  Consistencies Trialed: Ice Chips, Thin (IDDSI Level 0) - Straw    Pt consumed x1 ice chip and x1 large sip of thin liquid via straw. Pt without s/s aspiration with ice chip however pt with strong post-prandial coughing directly after sip of water, highly c/f aspiration.     Inpatient Education:  Pt educated on result and recommendations. Pt indicated understanding.         "

## 2023-11-28 NOTE — PROGRESS NOTES
Occupational Therapy                 Therapy Communication Note    Patient Name: Norah Cleaning  MRN: [id number]  Today's Date: 11/28/2023     Discipline: Occupational Therapy    Missed Visit Reason: Missed Visit Reason: Patient placed on medical hold    Missed Time: Attempt

## 2023-11-29 PROBLEM — Z51.5 ENCOUNTER FOR END OF LIFE CARE: Status: ACTIVE | Noted: 2023-01-01

## 2023-11-29 NOTE — DISCHARGE SUMMARY
Discharge Diagnosis  Pain    Test Results Pending At Discharge  Pending Labs       Order Current Status    Blood Culture Preliminary result    Blood Culture Preliminary result          Hospital Course  Norah Cleaning is an 83 year-old female with PMH of newly dx SCC of the R lung (yet to start tx, planned to start single agent Pembro 11/27), COPD/chronic hypoxic respiratory failure on 3.5L NC baseline, SHU, HTN, OA, CAD, PAD, aortic stenosis, pulmonary HTN, CHF, AI, depression, T4 compression fx of L4 from fall, and GERD, who presents today after she tripped and fell out of bed at her nursing facility. She denies hitting her head or losing consciousness. Xray of clavicle 11/26 showing acute fx of distal R clavicle. Ortho consulted, no acute intervention, RANJIT LEE, placed in sling for comfort. Admitted for pain management. On admit started oxycodone and IV dilaudid for pain management. Supportive onc consulted 11/27 for additional pain recommendations. 11/28 RN reported that pt was not tolerating her pills well and LS coarse. CXR obtained showing no acute process. A few hours later pt desaturated into the 80s requiring HFNC and was febrile. Bcx sent and started on Zosyn/Vanc (11/28) for presumed aspiration pneumonia. Daughter was informed over night with plans for further discussion of GOC in the AM. 11/29 AM daughter Ana agreed to comfort care/hospice. Comfort care orders placed, started IV morphine PRN and all previous meds discontinued. Hospice [facility] met with pt/daughter at bedside 11/29 and pt is being transitioned to [facility] today.    Pertinent Physical Exam At Time of Discharge  Physical Exam:  Full assessment deferred as pt is comfort care. Pt appears comfortable, in no distress.    Home Medications     Medication List      CONTINUE taking these medications     lidocaine 4 % patch     STOP taking these medications     acetaminophen 325 mg tablet; Commonly known as: Tylenol   acetaminophen 650  mg suppository; Commonly known as: Tylenol   albuterol 90 mcg/actuation inhaler   aspirin 81 mg chewable tablet   atorvastatin 40 mg tablet; Commonly known as: Lipitor   benzonatate 100 mg capsule; Commonly known as: Tessalon   cholecalciferol 50 MCG (2000 UT) tablet; Commonly known as: Vitamin D-3   diclofenac sodium 1 % gel gel; Commonly known as: Voltaren   Dulcolax (bisacodyl) 10 mg suppository; Generic drug: bisacodyl   DULoxetine 30 mg DR capsule; Commonly known as: Cymbalta   fluticasone propion-salmeteroL 500-50 mcg/dose diskus inhaler; Commonly   known as: Advair Diskus   gabapentin 100 mg capsule; Commonly known as: Neurontin   ipratropium-albuteroL 0.5-2.5 mg/3 mL nebulizer solution; Commonly known   as: Duo-Neb   lactobacillus acidophilus tablet tablet   losartan 50 mg tablet; Commonly known as: Cozaar   magnesium hydroxide 400 mg/5 mL suspension; Commonly known as: Milk of   Magnesia   melatonin 5 mg tablet   metoclopramide 10 mg tablet; Commonly known as: Reglan   metoprolol succinate XL 25 mg 24 hr tablet; Commonly known as: Toprol-XL   mineral oil enema   montelukast 10 mg tablet; Commonly known as: Singulair   Multiple Vitamin-Minerals tablet; Generic drug: multivitamin with   minerals   olopatadine 0.1 % ophthalmic solution; Commonly known as: Patanol   ondansetron 4 mg tablet; Commonly known as: Zofran   oxyCODONE-acetaminophen  mg tablet; Commonly known as: Percocet   oxygen gas therapy; Commonly known as: O2   pantoprazole 40 mg EC tablet; Commonly known as: ProtoNix   polyethylene glycol 17 gram packet; Commonly known as: Glycolax, Miralax   sennosides 8.6 mg tablet; Commonly known as: Senokot   sildenafil 20 mg tablet; Commonly known as: Revatio   Spiriva Respimat 2.5 mcg/actuation inhaler; Generic drug: tiotropium   torsemide 10 mg tablet; Commonly known as: Demadex   zinc oxide 20 % paste     Outpatient Follow-Up  No future appointments.    >30 minutes was spent on the  assessment/discharge plan of this patient    Assessment and plan discussed with attending physician Dr. Minh Roberts, APRN-CNP

## 2023-11-29 NOTE — PROGRESS NOTES
"Norah Cleaning is a 83 y.o. female on day 3 of admission presenting with Pain.    Subjective   Seen at bedside this morning. Pt is lethargic, minimally responsive. Over night pt with oxygen desaturation requiring HFNC and started on zosyn/vanc for probably aspiration PNA. Discussion was had with over night provider and daughter with plans for further talks about hospice today. This morning, comfort care/hospice was discussed with daughter Ana who was agreeable to move forward with comfort care/hospice. Hospice consulted and to see pt today. Comfort care orders placed and PRN morphine started. Pt appears comfortable and in no distress.    Objective     Physical Exam:  Full assessment deferred as pt is comfort care. Pt appears comfortable, in no distress.    Last Recorded Vitals  Blood pressure 174/85, pulse 94, temperature 37.6 °C (99.7 °F), temperature source Temporal, resp. rate 18, height 1.549 m (5' 1\"), weight (!) 44.6 kg (98 lb 5.2 oz), SpO2 97 %.  Intake/Output last 3 Shifts:  I/O last 3 completed shifts:  In: 235 (5.3 mL/kg) [I.V.:235 (5.3 mL/kg)]  Out: - (0 mL/kg)   Weight: 44.6 kg     Relevant Results  Scheduled medications  lidocaine, 1 patch, transdermal, Daily    Continuous medications     PRN medications  PRN medications: glycopyrrolate, LORazepam, morphine     Assessment/Plan   Principal Problem:    Pain  Active Problems:    Weakness    HTN (hypertension)    COPD (chronic obstructive pulmonary disease) (CMS/HCC)    Squamous cell carcinoma of right lung (CMS/HCC)    Obstructive sleep apnea    Closed fracture of right clavicle    Norah Cleaning is an 83 year-old female with PMH of newly dx SCC of the R lung (yet to start tx, planned to start single agent Pembro 11/27), COPD/chronic hypoxic respiratory failure on 3.5L NC baseline, SHU, HTN, OA, CAD, PAD, aortic stenosis, pulmonary HTN, CHF, AI, depression, T4 compression fx of L4 from fall, and GERD, who presents today after she tripped and " fell out of bed at her nursing facility. She denies hitting her head or losing consciousness. Xray of clavicle 11/26 showing acute fx of distal R clavicle. Ortho consulted, no acute intervention, NWB MARIA TE, placed in sling for comfort. Admitted for pain management. On admit started oxycodone and IV dilaudid for pain management. Supportive onc consulted 11/27 for additional pain recommendations. 11/28 RN reported that pt was not tolerating her pills well and LS coarse. CXR obtained showing no acute process. A few hours later pt desaturated into the 80s requiring HFNC and was febrile. Bcx sent and started on Zosyn/Vanc (11/28) for presumed aspiration pneumonia. Daughter was informed over night with plans for further discussion of GOC in the AM. 11/29 AM daughter Ana agreed to comfort care/hospice. Comfort care orders placed, started IV morphine PRN and all previous meds discontinued. Plan for hospice [facility] to meet with pt/family today with pending dispo.    # Comfort Care  - Pt made comfort care after discussion with daughter Ana 11/29  - Plan for hospice [facility] to meet with daughter today 11/29  - Started IV Morphine 2mg [id number] PRN RDOS >/= 3  - Started IV Ativan 0.5mg q4hrs PRN anxiety/restlessness/insomnia  - Started IV Robinul 0.2mg q4hrs PRN secretions  - Continue Lidocaine patch  - All previous routine meds discontinued to focus on comfort  - Mouth care q4hrs  - Music, , and Pet therapy consulted     # SCC R Lung (stage IVB, cT2, cN2, cM1c)  - Primary oncologist: Dr. Susana Sauer  - 10/1/23: Presented to ER with several days of worsening dyspnea and some difficulty swallowing. Found to have RUL mass  - 10/5/23: CT guided biopsy of RUL mass with squamous cell carcinoma, keratinizing type  - 10/13/23: Readmitted due to dyspnea, found to have troponin elevation into the several hundreds, likely post-obstructive PNA, possible volume overload  - 11/10/23: PET with concern for  metastatic disease in the bones   - Hospice vs treatment discussed at clinic visit 11/22/23 and pt elected to continue with Pembro. Has yet to start, was planned to start 11/27  - Now deferring further tx as pt is comfort care     DISPO:  - DNR Comfort Care  - Access: PIV  - DC pending hospice plan ([facility] vs hospice house)  - NOK: Gokul Haskins (daughter): [phone]    I spent >60 minutes in the professional and overall care of this patient.    Assessment and plan discussed with attending physician Dr. Minh Roberts, APRN-CNP

## 2023-11-29 NOTE — NURSING NOTE
"Met with pt and her daughter at bedside. Discussed HWR services. POC goals of care and levels of care. Recommended transfer to hospice house for symptom management/medication management. Daughter states she was told pt could stay at hospital with hospice care. We discussed starting hospice GIP today with reevaluation tomorrow to see if pt able to transfer to . She states she is not wanting her mom to have to leave hospital. We spoke of needing a discharge plan for if pt stabilizes. She stated \"we can discuss that if the time comes\". Verified chart is now flipped to hospice chart and symptom management meds are ordered.   HWR nurse will visit tomorrow for assessment   "

## 2023-11-29 NOTE — SIGNIFICANT EVENT
2100 provider notified of vitals significant of Temp of 38.2 and spO2 88% on 6L. Blood cultures x2 collected and patient started on empiric antibiotics of vancomycin + zosyn with c/f aspiration pneumonia. CXR at 1200 on 11/28 showed RLL atelectasis with known RUL mass. STAT CXR ordered I/s/o change in clinical status, however, patient refused. RT collected repeat ABG which showed sO2 of 91.7%, pO2 of 59%, and pH 7.43 and patient placed on high flow oxygen by respiratory therapy. Patient now satting 93% on 8L. Provider contacted KAITOA, Gokul, to update her on the patient's clinical status and confirm code status. Discussed all code statuses with patient's daughter. Per daughter, she believed patient would wish to be DNR, DNI; however, she would like to confirm status with patient's other daughter and patient's . After discussion with family, patient's family would like to change code status to DNR, DNI and discuss comfort care and hospice in the morning. Daughter, Gokul, to come to [facility].

## 2023-11-29 NOTE — PROGRESS NOTES
Spiritual Care Visit    Clinical Encounter Type  Visited With: Patient and family together  Routine Visit: Introduction  Crisis Visit: Patient actively dying  Referral From: Nurse, Verbal     Sacramental Encounters  Sacrament of Sick-Anointing: Patient requested anointing    Family requested anointing for the patient who is Yarsanism.  introduced self to patient and patient's daughter and provided emotional and spiritual support. Daughter was able to share her mother's experiences over the last five years, her mom continually fighting, and how she and their family were doing as she mom moved to comfort care.     coordinated a visit from the [facility]  to offer an anointing to the patient with the daughter present. Yarsanism kim is very important to the patient and they were grateful for the visit.     Please reach out with any needs/concerns.     Rev. Pierre Goodman, Supportive Oncology

## 2023-11-29 NOTE — PROGRESS NOTES
"SUPPORTIVE AND PALLIATIVE ONCOLOGY INPATIENT FOLLOW-UP      SERVICE DATE: 23     SUBJECTIVE:   HISTORY OF PRESENT ILLNESS: Norah Cleaning is a 83 y.o. female on hospital day #3 with PMHx of newly dx SCC of the R lung (yet to start tx, planned to start single agent Pembro 23), COPD/chronic hypoxic respiratory failure (3.5L NC baseline), SHU, HTN, OA, CAD, PAD, aortic stenosis, pulmonary HTN, CHF, depression, T4 compression fx of L4 from fall, and GERD. Pt presented to the ED after she tripped and fell out of bed at her nursing facility and she was found to have R clavicle fracture. Course complicated by pain. Supportive and Palliative Oncology is following for introduction to services and pain management.     Interval Events:   Pt resting comfortably in bed. Minimally responsive due to lethargy and AMS. Spoke with pt's daughter, Ana, at bedside and plan is to meet with and transition to hospice today at 1730. Current plan is to remain GIP hospice per pt's daughter.    Pain Assessment:    NPAT: 0  Emotion: 0  Movement: 0  Verbal Cues: 0  Facial Cues: 0  Positionin    RDOS: 2  HR/min: 1   RR/min: 1  Restlessness: 0  Accessory Muscle Use: 0  Paradoxical Breathin  Grunting End-Expiration:  Nasal Flarin  Look of Fear: 0    Limited verbal review of pain able to be completed due to pt lethargy and AMS. Pt able to state her pain is \"good right now.\" Pt's daughter Ana at bedside denying any signs of uncontrolled pain.     Opioid Use  Past 24h opioid use:   Hydromorphone 0.5mg IV x 3 = 1.5mg IV = 30 OME  Morphine 2mg IV x 3 = 6mg IV = 18 OME  Oxycodone 10mg x 1 = 15 OME    Total 24h OME use:  63 OME    Symptom Assessment:  Unable to obtain secondary to lethargy and AMS.    Information obtained from: chart review, interview of patient, and discussion with pt's daughter  ______________________________________________________________________   OBJECTIVE:    Lab Results   Component Value Date "    WBC 6.4 11/28/2023    HGB 7.3 (L) 11/28/2023    HCT 24.0 (L) 11/28/2023     11/28/2023     11/28/2023      Lab Results   Component Value Date    GLUCOSE 65 (L) 11/28/2023    CALCIUM 8.9 11/28/2023     11/28/2023    K 4.0 11/28/2023    CO2 31 11/28/2023     11/28/2023    BUN 15 11/28/2023    CREATININE 0.82 11/28/2023     Lab Results   Component Value Date    ALT 5 (L) 11/28/2023    AST 17 11/28/2023    ALKPHOS 49 11/28/2023    BILITOT 0.5 11/28/2023     Estimated Creatinine Clearance: 37.2 mL/min (by C-G formula based on SCr of 0.82 mg/dL).     Scheduled medications  lidocaine, 1 patch, transdermal, Daily    Continuous medications     PRN medications  PRN medications: glycopyrrolate, LORazepam, morphine   }  PHYSICAL EXAMINATION:    Vital Signs:   Vital signs reviewed  Visit Vitals  /85 (BP Location: Right arm, Patient Position: Lying)   Pulse 94   Temp 37.6 °C (99.7 °F) (Temporal)   Resp 18      Pain Score: 8     Physical Exam  Vitals and nursing note reviewed.   Constitutional:       Comments: Extremely lethargic, cachectic, ill appearing, elderly woman laying in bed, in no acute distress. Not participating in interview due to lethargy.     HENT:      Head:      Comments: Normocephalic, atraumatic.    Neck:      Comments: Trachea midline.  Pulmonary:      Comments: Symmetrical chest rise. Tachypneic rate and shallow depth of respirations. 3.5L NC.  Abdominal:      Comments: Abdomen non distended   Musculoskeletal:      Comments: Generalized muscle weakness and atrophy. OROZCO x4. No visible edema.   Skin:     Comments: No lesions, rash, or abrasions present on visible skin. Skin color appropriate for ethnicity.   Neurological:      Comments: A&Ox1, generalized weakness.   Psychiatric:      Comments: Extremely lethargic. Unable to establish mood and behavior due to this.       ASSESSMENT/PLAN:    Norah Cleaning is a 83 y.o. female on hospital day #3 with PMHx of newly dx SCC of  the R lung (yet to start tx, planned to start single agent Pembro 11/27/23), COPD/chronic hypoxic respiratory failure (3.5L NC baseline), SHU, HTN, OA, CAD, PAD, aortic stenosis, pulmonary HTN, CHF, depression, T4 compression fx of L4 from fall, and GERD. Pt presented to the ED after she tripped and fell out of bed at her nursing facility and she was found to have R clavicle fracture. Course complicated by pain. Supportive and Palliative Oncology is following for introduction to services and pain management.     End of life, Comfort Measures Plan:  Hospital course c/b new dx SCC R lung, new R clavicle fracture, renal impairment, and COPD/chronic hypoxic respiratory failure  Recent R clavicle fracture related pain  Shortness of breath related to dying process  At risk for nausea/vomiting  At risk for opioid induced constipation  At risk for anxiety related to dying process  Assess for respiratory distress using RDOS. Signs of discomfort/respiratory distress include tachypnea, restlessness, accessory muscle use, grunting at end expiration, nasal flaring, fearful facial expressions  If patient unable to self-report, assess pain using NPAT scale, and assess dyspnea using RDOS scale every 4 hours and as needed to assess response to medication  Comfort care measures with frequent skilled nursing monitoring and medication adjustment as necessary to manage symptoms of pain, respiratory distress, and inability to manage secretions r/t dying process.  Use Comfort care order set  Discontinue any medications or orders that do not provide comfort or manage symptoms  Start morphine 2mg IV q15min PRN for moderate (4-6) to severe (7-10) pain  Start Lorazepam 0.5mg IV q4h PRN for anxiety/agitation  Start Robinul 0.2mg IV q4h PRN for secretions  Start Acetaminophen 650mg suppository q4h PRN for fever  Start Zofran 4mg IV q4h PRN for n/v  Start Bisacodyl 10mg suppository daily PRN for constipation  Offer q4h oral care  If patient  stabilizes for transfer to RNF/hospice facility will plan to transition to oral meds for symptom control using concentrated morphine    Signature and billing:  Thank you for allowing us to participate in the care of this patient. Recommendations will be communicated back to the consulting service by way of shared electronic medical record or face-to-face.    Medical complexity was high level due to due to complexity of problems, extensive data review, and high risk of management/treatment.    I spent 40 minutes in the care of this patient which included chart review, interviewing patient/family, discussion with primary team, coordination of care, and documentation.    Data:   Diagnostic tests and information reviewed for today's visit:  Conversation with primary team, Most recent labs and imaging results, Most recent EKG, Medications    Some elements copied from my note on 11/27/23, the elements have been updated and all reflect current decision making from today, 11/29/23     Plan of Care discussed with: Primary team, pt's daughter    Thank you for asking Supportive and Palliative Oncology to assist with care of this patient.  We will continue to follow  Please contact us for additional questions or concerns.    SIGNATURE: CELIO Galicia   PAGER/CONTACT:  Contact information:  Supportive and Palliative Oncology  Monday-Friday 8 AM-5 PM  Epic Secure chat or pager [phone].  After hours and weekends:  pager [phone]

## 2023-11-29 NOTE — CONSULTS
Vancomycin Dosing by Pharmacy- Cessation of Therapy    Consult to pharmacy for vancomycin dosing has been discontinued by the prescriber, pharmacy will sign off at this time.    Please call pharmacy if there are further questions or re-enter a consult if vancomycin is resumed.     Cheryl Lynn, PharmD
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
## 2023-11-29 NOTE — PROGRESS NOTES
"Vancomycin Dosing by Pharmacy- INITIAL    Norah Cleaning is a 83 y.o. year old female who Pharmacy has been consulted for vancomycin dosing for pneumonia. Based on the patient's indication and renal status this patient will be dosed based on a goal AUC of 400-600.     Renal function is currently stable.    Visit Vitals  /73 (BP Location: Right arm, Patient Position: Lying)   Pulse 87   Temp 38.2 °C (100.8 °F) (Temporal)   Resp 19        Lab Results   Component Value Date    CREATININE 0.82 11/28/2023    CREATININE 0.88 11/27/2023    CREATININE 0.82 11/26/2023    CREATININE 0.85 11/22/2023        Patient weight is No results found for: \"PTWEIGHT\"    No results found for: \"CULTURE\"     I/O last 3 completed shifts:  In: 235 (5.3 mL/kg) [I.V.:235 (5.3 mL/kg)]  Out: 0 (0 mL/kg)   Weight: 44.6 kg   [unfilled]    Lab Results   Component Value Date    PATIENTTEMP 37.0 11/28/2023    PATIENTTEMP 37.0 10/13/2023    PATIENTTEMP 37.0 03/23/2022    PATIENTTEMP 37.0 03/23/2022          Assessment/Plan     Patient will be given a loading dose of 1250mg mg.  Will initiate vancomycin maintenance,750mg q 24 hours   This dosing regimen is predicted by [employer] to result in the following pharmacokinetic parameters:  Loading dose: 1250 mg at 00:00 11/28/2023.  Regimen: 750 mg IV every 24 hours.  Start time: 00:00 on 11/29/2023  Exposure target: AUC24 (range)400-600 mg/L.hr   AUC24,ss: 410 mg/L.hr  Probability of AUC24 > 400: 53 %  Ctrough,ss: 12.1 mg/L  Probability of Ctrough,ss > 20: 10 %  Probability of nephrotoxicity ([place] PIPPA 2009): 7 %    Follow-up level will be ordered on 11/30 at 1st am labs unless clinically indicated sooner.  Will continue to monitor renal function daily while on vancomycin and order serum creatinine at least every 48 hours if not already ordered.  Follow for continued vancomycin needs, clinical response, and signs/symptoms of toxicity.       Marlon Lino, PharmD       "

## 2023-11-29 NOTE — PROGRESS NOTES
Spiritual Care Visit    Clinical Encounter Type  Visited With: Patient and family together  Routine Visit: Introduction  Continue Visiting: Yes  Crisis Visit: Patient actively dying  Referral From:   Referral To:               Sacramental Encounters  Sacrament of Sick-Anointing: Patient requested anointing    Patient received the Sacrament of the Anointing of the Sick by Fr. Travis Schmitt, [facility] .    Daughter was present.       Within the [employer] the Sacrament of the Sick, also known as the Anointing of the Sick, is a prayer in which we invoke the healing power of God.  Along with Eucharist and Reconciliation it is a repeatable sacrament and should be received by anyone about to undergo surgery, those in recovery and the elderly.  This IS NOT 'Last Rites' nor does the [employer] have such a ritual.  Those who are actively dying should receive the Anointing of the Sick for physical and spiritual healing.

## 2023-11-29 NOTE — PROGRESS NOTES
Norah Cleaning is a 83 y.o. female on day 3 of admission presenting with Pain.    LSW met with pt/dtr Ana at bedside re: hospice service at request of CNP, pt appears lethargic/disoriented and unable to participate in conversation. Reviewed hospice services and setting with dtr. Dtr reports CNP explained everything to her and she wants to move forward with hospice services. D/t poor prognosis pt will likely neep Gip/Hospice House so dtr would like to use [facility]. Referral made in [facility]. [facility] to meet with dtr at bedside today to assess. LSW to cont to follow through discharge.      Joi Arce, MOLLY

## 2023-11-30 NOTE — NURSING NOTE
RN Hospice Note    Norah Cleaning is a Hospice Patient.   Hospice terminal diagnosis: Lung Ca  Physician: Dr. Kincaid  Visit type: Routine follow-up GIP visit, DAY 2    Comments/recommendations: This RN present for follow up visit to pt.  Pt unresponsive.  Pt had increased sx's pain last night, resulted in initiation of morphine gtt, 1 mg/hr cont initially, increased to 2 mg/hr basal rate last night d/t increased sx's.  Pt appears comfortable at this time.  Per MD notes, family declines to transfer to [facility], continue to state told pt can remain at [facility] under GIP until death.  Pts MACARIO Gino at bedside, dtr Ana, not present, due to arrive shortly.   This RN confirmed with family desire to remain at [facility] under [facility] GIP at this time, aware [facility] will continue to follow up daily.  Family accepting of ipu booklet and journey's path literature this visit.  Thanks for the collaborative care of this pt and family.      Discharge Planning:  Patient to be discharged to D - pt appears to be actively dying, no discharge planned.    Plan of care reviewed with patient/family members LUIS Carmen/PALOMA Bull/LUIS Oconnor [facility]   Plan of care reviewed with hospital staff members: Natalya Kelley, pall care CNP, Heather Hand PA-C for butcher team.      Please notify [facility] of any changes in condition. Thank you.  Office: [phone] (8 am-6:30 pm M-F and 8 am-4:30 pm weekends and holidays)   [phone] (6:30 pm-8 am M-F and 4:30 pm-8 am weekends and holidays)    Lisseth Ferris RN

## 2023-11-30 NOTE — PROGRESS NOTES
Norah Cleaning is a 83 y.o. female on day 1 of admission presenting with Encounter for end of life care.    Subjective   Norah was seen at bedside with daughter present. Pt was resting peacefully in bed, no signs of distress or pain. We discussed that the morphine basal rate was increased last night and the pt's daughter states that seemed to be effective in helping her mother's pain overnight. Inpatient hospice vs hospice house was discussed with pt's daughter and she states she would prefer to stay inpatient if possible. We discussed that the hospice team will be notified of this.        Objective     Physical Exam: Full assessment deferred as pt is comfort care. Pt appears comfortable, in no distress    Last Recorded Vitals  There were no vitals taken for this visit.  Intake/Output last 3 Shifts:  No intake/output data recorded.        Malnutrition          I agree with the dietitian's malnutrition diagnosis.      Assessment/Plan   Principal Problem:    Encounter for end of life care    Norah Cleaning is an 83 year-old female with PMH of newly dx SCC of the R lung (yet to start tx, planned to start single agent Pembro 11/27), COPD/chronic hypoxic respiratory failure on 3.5L NC baseline, SHU, HTN, OA, CAD, PAD, aortic stenosis, pulmonary HTN, CHF, AI, depression, T4 compression fx of L4 from fall, and GERD, who presents today after she tripped and fell out of bed at her nursing facility. She denies hitting her head or losing consciousness. Xray of clavicle 11/26 showing acute fx of distal R clavicle. Ortho consulted, no acute intervention, NWB RUE, placed in sling for comfort. Admitted for pain management. On admit started oxycodone and IV dilaudid for pain management. Supportive onc consulted 11/27 for additional pain recommendations. 11/28 RN reported that pt was not tolerating her pills well and LS coarse. CXR obtained showing no acute process. A few hours later pt desaturated into the 80s requiring  HFNC and was febrile. Bcx sent and started on Zosyn/Vanc (11/28) for presumed aspiration pneumonia. Daughter was informed over night with plans for further discussion of GOC in the AM. 11/29 AM daughter Ana agreed to comfort care/hospice. Comfort care orders placed, started IV morphine PRN and all previous meds discontinued. Hospice [facility] to met with family 11/29, hospice plan to discuss hospice house vs GIP with family today 11/30.     # Comfort Care  - Pt made comfort care after discussion with daughter Ana 11/29  - Plan for [facility] to meet with daughter today 11/29  - Started Morphine PCA bolus dose 1mg, lockout interval 10 minutes, basal rate 2mg/hr  - Started IV Ativan 0.5mg q4hrs PRN anxiety/restlessness/insomnia  - Started IV Robinul 0.2mg q4hrs PRN secretions  - Continue Lidocaine patch  - All previous routine meds discontinued to focus on comfort  - Mouth care q4hrs  - Music, , and Pet therapy consulted     # SCC R Lung (stage IVB, cT2, cN2, cM1c)  - Primary oncologist: Dr. Susana Sauer  - 10/1/23: Presented to ER with several days of worsening dyspnea and some difficulty swallowing. Found to have RUL mass  - 10/5/23: CT guided biopsy of RUL mass with squamous cell carcinoma, keratinizing type  - 10/13/23: Readmitted due to dyspnea, found to have troponin elevation into the several hundreds, likely post-obstructive PNA, possible volume overload  - 11/10/23: PET with concern for metastatic disease in the bones   - Hospice vs treatment discussed at clinic visit 11/22/23 and pt elected to continue with Pembro. Has yet to start, was planned to start 11/27  - Now deferring further tx as pt is comfort care     DISPO:  - DNR Comfort Care  - Access: PIV  - DC pending hospice plan (GIP vs hospice house)  - NOK: Gokul Haskins (daughter): [phone]       I spent >60 minutes in the professional and overall care of this patient.    Assessment and plan discussed with  attending physician Dr. Fanta PA-C

## 2023-11-30 NOTE — SIGNIFICANT EVENT
Morphine PCA started. 1mg loading dose given, 1mg/hr basal rate set. Pt appears comfortable at this time.

## 2023-12-01 NOTE — NURSING NOTE
11/30/23 22:30 - RN at bedside at the time of unresponsiveness. Daughter at the bedside. No respirations, pulse, or heart/breath sounds auscultated. No code called per advanced directive and signed DNR-Comfort Measures Only per order in chart. Time of death pronounced by Lisseth Henriquez PA-C at 22:45. Morphine PCA discontinued. Mortician called - awaiting arrival. Awaiting postmortum care upon family departure. - Meliza Montemayor RN

## 2023-12-01 NOTE — DISCHARGE SUMMARY
Discharge Diagnosis  Encounter for end of life care    Called to bedside at ~2240. Nursing informed myself that patient ceased to breathe. Arrived to declare patient. Arrived to confirm the following: Absence of breath sounds, absence of cardiac activity, and absence of pupillary response. No evidence of inspiration/expiration, no auscultation of heart sounds, negative pupillary response to light, and no pain response to stimuli. Time of death declared at 2245 on 11/30/23. Daughter present at bedside during pronouncement.     Test Results Pending At Discharge  Pending Labs       No current pending labs.            Hospital Course   Norah Cleaning is an 83 year-old female with PMH of newly dx SCC of the R lung (yet to start tx, planned to start single agent Pembro 11/27), COPD/chronic hypoxic respiratory failure on 3.5L NC baseline, SHU, HTN, OA, CAD, PAD, aortic stenosis, pulmonary HTN, CHF, AI, depression, T4 compression fx of L4 from fall, and GERD, who presents today after she tripped and fell out of bed at her nursing facility. She denies hitting her head or losing consciousness. Xray of clavicle 11/26 showing acute fx of distal R clavicle. Ortho consulted, no acute intervention, NWB RUE, placed in sling for comfort. Admitted for pain management. On admit started oxycodone and IV dilaudid for pain management. Supportive onc consulted 11/27 for additional pain recommendations. 11/28 RN reported that pt was not tolerating her pills well and LS coarse. CXR obtained showing no acute process. A few hours later pt desaturated into the 80s requiring HFNC and was febrile. Bcx sent and started on Zosyn/Vanc (11/28) for presumed aspiration pneumonia. Daughter was informed over night with plans for further discussion of GOC in the AM. 11/29 AM daughter Ana agreed to comfort care/hospice. Comfort care orders placed, started IV morphine PRN and all previous meds discontinued. [facility] to met with  family 11/29, hospice plan to discuss hospice house vs [facility] with family today 11/30.     # Comfort Care  - Pt made comfort care after discussion with daughter Ana 11/29  - Plan for hospice [facility] to meet with daughter today 11/29  - Started Morphine PCA bolus dose 1mg, lockout interval 10 minutes, basal rate 2mg/hr  - Started IV Ativan 0.5mg q4hrs PRN anxiety/restlessness/insomnia  - Started IV Robinul 0.2mg q4hrs PRN secretions  - Continue Lidocaine patch  - All previous routine meds discontinued to focus on comfort  - Mouth care q4hrs  - Music, , and Pet therapy consulted     # SCC R Lung (stage IVB, cT2, cN2, cM1c)  - Primary oncologist: Dr. Susana Sauer  - 10/1/23: Presented to ER with several days of worsening dyspnea and some difficulty swallowing. Found to have RUL mass  - 10/5/23: CT guided biopsy of RUL mass with squamous cell carcinoma, keratinizing type  - 10/13/23: Readmitted due to dyspnea, found to have troponin elevation into the several hundreds, likely post-obstructive PNA, possible volume overload  - 11/10/23: PET with concern for metastatic disease in the bones   - Hospice vs treatment discussed at clinic visit 11/22/23 and pt elected to continue with Pembro. Has yet to start, was planned to start 11/27  - Now deferring further tx as pt is comfort care     DISPO:  - DNR Comfort Care  - Access: PIV  - DC pending hospice plan ([facility]IP vs hospice house)  - NOK: Gokul Haskins (daughter): [phone]    Pertinent Physical Exam At Time of Discharge  Physical Exam    Home Medications     Medication List      STOP taking these medications     lidocaine 4 % patch       Outpatient Follow-Up  Future Appointments   Date Time Provider Department Center   12/27/2023  1:30 PM Luis Angel Redding MD [id number] JOIE Tan-CNP

## 2023-12-01 NOTE — NURSING NOTE
12/01/23 00:45: Postmortem care performed. Meraz catheter and PIV removed. Dentures intact. Patient identification tag placed on right big toe and outside zipper of bag. Transportation to [facility] [place]. - Nida Coleman RN

## 2023-12-03 LAB
BACTERIA BLD CULT: NORMAL
BACTERIA BLD CULT: NORMAL

## 2023-12-13 LAB
HOLD SPECIMEN: NORMAL

## 2023-12-27 ENCOUNTER — APPOINTMENT (OUTPATIENT)
Dept: ORTHOPEDIC SURGERY | Facility: CLINIC | Age: 83
End: 2023-12-27
Payer: OTHER MISCELLANEOUS

## 2025-01-19 NOTE — LETTER
Patient: Norah Cleaning  : 1940    Encounter Date: 07/10/2023    PROGRESS NOTE    Subjective  Chief complaint: Norah Cleaning is a 82 y.o. female who is a long term resident patient being seen and evaluated for monthly general medical care and follow-up.    HPI:   Patient presents for general medical care and f/u.  Patient seen and examined at bedside.  Patient with dx of HTN.  BP at goal.  Denies chest pain and headache.  COPD is stable, denies sob, wheezing, cough.  Patient with diagnosis of depression.  Mood is stable.  Patient denies feeling depressed, down and thoughts of harming self or others.  Patient with dx of OA.  Pain controlled with current medication regime.  GERD controlled.  Denies heartburn, regurgitation, epigastric discomfort, sour taste, and cough.      Objective  Vital signs:   122/62, 97.6, 62, 18, 96%  Physical Exam  Constitutional:       General: She is not in acute distress.  Eyes:      Extraocular Movements: Extraocular movements intact.   Cardiovascular:      Rate and Rhythm: Regular rhythm.   Pulmonary:      Effort: Pulmonary effort is normal.      Breath sounds: Normal breath sounds.      Comments: O2  Abdominal:      General: Bowel sounds are normal.      Palpations: Abdomen is soft.   Musculoskeletal:      Cervical back: Neck supple.      Right lower leg: No edema.      Left lower leg: No edema.   Neurological:      Mental Status: She is alert.   Psychiatric:         Mood and Affect: Mood normal.         Behavior: Behavior is cooperative.         Assessment/Plan  Problem List Items Addressed This Visit       HTN (hypertension) - Primary     Monitor BP  Continue antihypertensive meds         COPD (chronic obstructive pulmonary disease) (CMS/[facility])     Stable  O2  Monitor POX  Continue current meds as ordered         GERD (gastroesophageal reflux disease)     Continue Protonix         Depression     Stable  Continue antidepressant  Monitor mood           Osteoarthritis      Continue pain meds  Monitor pain          Medications, treatments, and labs reviewed  Continue medications and treatments as listed in EMR    Scribe Attestation  I, Duane Castillo   attest that this documentation has been prepared under the direction and in the presence of Shyla Kan MD    Provider Attestation - Scribe documentation  All medical record entries made by the Scribe were at my direction and personally dictated by me. I have reviewed the chart and agree that the record accurately reflects my personal performance of the history, physical exam, discussion and plan.   Shyla Kan MD        Electronically Signed By: Shyla Kan MD   7/11/23  4:57 PM   No